# Patient Record
Sex: FEMALE | Race: WHITE | NOT HISPANIC OR LATINO | Employment: UNEMPLOYED | URBAN - METROPOLITAN AREA
[De-identification: names, ages, dates, MRNs, and addresses within clinical notes are randomized per-mention and may not be internally consistent; named-entity substitution may affect disease eponyms.]

---

## 2017-12-19 ENCOUNTER — HOSPITAL ENCOUNTER (OUTPATIENT)
Dept: FAMILY MEDICINE CLINIC | Facility: CLINIC | Age: 37
Setting detail: SPECIMEN
Discharge: HOME | End: 2017-12-19
Attending: FAMILY MEDICINE | Admitting: FAMILY MEDICINE

## 2017-12-19 LAB
ALBUMIN SERPL-MCNC: 3.9 G/DL (ref 3.5–4.8)
ALBUMIN/GLOB SERPL: 1.1 {RATIO} (ref 1–1.7)
ALP SERPL-CCNC: 63 IU/L (ref 32–91)
ALT SERPL-CCNC: 27 IU/L (ref 14–54)
ANION GAP SERPL CALC-SCNC: 11.9 MMOL/L (ref 10–20)
AST SERPL-CCNC: 24 IU/L (ref 15–41)
BASOPHILS # BLD AUTO: 0.1 10*3/UL (ref 0–0.2)
BASOPHILS NFR BLD AUTO: 1 % (ref 0–2)
BILIRUB SERPL-MCNC: 0.4 MG/DL (ref 0.3–1.2)
BUN SERPL-MCNC: 13 MG/DL (ref 8–20)
BUN/CREAT SERPL: 14.4 (ref 5.4–26.2)
CALCIUM SERPL-MCNC: 9.4 MG/DL (ref 8.9–10.3)
CHLORIDE SERPL-SCNC: 102 MMOL/L (ref 101–111)
CHOLEST SERPL-MCNC: 264 MG/DL
CHOLEST/HDLC SERPL: 5.6 {RATIO}
CONV CO2: 28 MMOL/L (ref 22–32)
CONV LDL CHOLESTEROL DIRECT: 184 MG/DL (ref 0–100)
CONV TOTAL PROTEIN: 7.6 G/DL (ref 6.1–7.9)
CREAT UR-MCNC: 0.9 MG/DL (ref 0.4–1)
DIFFERENTIAL METHOD BLD: (no result)
EOSINOPHIL # BLD AUTO: 0.2 10*3/UL (ref 0–0.3)
EOSINOPHIL # BLD AUTO: 2 % (ref 0–3)
ERYTHROCYTE [DISTWIDTH] IN BLOOD BY AUTOMATED COUNT: 13.8 % (ref 11.5–14.5)
GLOBULIN UR ELPH-MCNC: 3.7 G/DL (ref 2.5–3.8)
GLUCOSE SERPL-MCNC: 77 MG/DL (ref 65–99)
HCT VFR BLD AUTO: 45.5 % (ref 35–49)
HDLC SERPL-MCNC: 47 MG/DL
HGB BLD-MCNC: 14.9 G/DL (ref 12–15)
LDLC/HDLC SERPL: 3.9 {RATIO}
LIPID INTERPRETATION: ABNORMAL
LYMPHOCYTES # BLD AUTO: 3.2 10*3/UL (ref 0.8–4.8)
LYMPHOCYTES NFR BLD AUTO: 28 % (ref 18–42)
MCH RBC QN AUTO: 30.4 PG (ref 26–32)
MCHC RBC AUTO-ENTMCNC: 32.7 G/DL (ref 32–36)
MCV RBC AUTO: 93 FL (ref 80–94)
MONOCYTES # BLD AUTO: 0.8 10*3/UL (ref 0.1–1.3)
MONOCYTES NFR BLD AUTO: 7 % (ref 2–11)
NEUTROPHILS # BLD AUTO: 7.4 10*3/UL (ref 2.3–8.6)
NEUTROPHILS NFR BLD AUTO: 62 % (ref 50–75)
NRBC BLD AUTO-RTO: 0 /100{WBCS}
NRBC/RBC NFR BLD MANUAL: 0 10*3/UL
PLATELET # BLD AUTO: 314 10*3/UL (ref 150–450)
PMV BLD AUTO: 8 FL (ref 7.4–10.4)
POTASSIUM SERPL-SCNC: 4.9 MMOL/L (ref 3.6–5.1)
RBC # BLD AUTO: 4.89 10*6/UL (ref 4–5.4)
SODIUM SERPL-SCNC: 137 MMOL/L (ref 136–144)
TRIGL SERPL-MCNC: 247 MG/DL
VLDLC SERPL CALC-MCNC: 32.7 MG/DL
WBC # BLD AUTO: 11.7 10*3/UL (ref 4.5–11.5)

## 2017-12-28 ENCOUNTER — HOSPITAL ENCOUNTER (OUTPATIENT)
Dept: PHYSICAL THERAPY | Facility: HOSPITAL | Age: 37
Setting detail: RECURRING SERIES
Discharge: HOME | End: 2018-01-25
Attending: FAMILY MEDICINE | Admitting: FAMILY MEDICINE

## 2019-01-08 ENCOUNTER — HOSPITAL ENCOUNTER (OUTPATIENT)
Dept: FAMILY MEDICINE CLINIC | Facility: CLINIC | Age: 39
Setting detail: SPECIMEN
Discharge: HOME | End: 2019-01-08
Attending: FAMILY MEDICINE | Admitting: FAMILY MEDICINE

## 2019-01-08 LAB
ALBUMIN SERPL-MCNC: 3.1 G/DL (ref 3.5–4.8)
ALBUMIN/GLOB SERPL: 0.8 {RATIO} (ref 1–1.7)
ALP SERPL-CCNC: 73 IU/L (ref 32–91)
ALT SERPL-CCNC: 22 IU/L (ref 14–54)
ANION GAP SERPL CALC-SCNC: 13.7 MMOL/L (ref 10–20)
AST SERPL-CCNC: 23 IU/L (ref 15–41)
BASOPHILS # BLD AUTO: 0.1 10*3/UL (ref 0–0.2)
BASOPHILS NFR BLD AUTO: 1 % (ref 0–2)
BILIRUB SERPL-MCNC: 0.2 MG/DL (ref 0.3–1.2)
BUN SERPL-MCNC: 6 MG/DL (ref 8–20)
BUN/CREAT SERPL: 10 (ref 5.4–26.2)
CALCIUM SERPL-MCNC: 8.7 MG/DL (ref 8.9–10.3)
CHLORIDE SERPL-SCNC: 102 MMOL/L (ref 101–111)
CHOLEST SERPL-MCNC: 207 MG/DL
CHOLEST/HDLC SERPL: 5 {RATIO}
CONV CO2: 25 MMOL/L (ref 22–32)
CONV LDL CHOLESTEROL DIRECT: 160 MG/DL (ref 0–100)
CONV TOTAL PROTEIN: 6.9 G/DL (ref 6.1–7.9)
CREAT UR-MCNC: 0.6 MG/DL (ref 0.4–1)
DIFFERENTIAL METHOD BLD: (no result)
EOSINOPHIL # BLD AUTO: 0.2 10*3/UL (ref 0–0.3)
EOSINOPHIL # BLD AUTO: 3 % (ref 0–3)
ERYTHROCYTE [DISTWIDTH] IN BLOOD BY AUTOMATED COUNT: 13.4 % (ref 11.5–14.5)
GLOBULIN UR ELPH-MCNC: 3.8 G/DL (ref 2.5–3.8)
GLUCOSE SERPL-MCNC: 74 MG/DL (ref 65–99)
HCT VFR BLD AUTO: 40.5 % (ref 35–49)
HDLC SERPL-MCNC: 41 MG/DL
HGB BLD-MCNC: 13.9 G/DL (ref 12–15)
LDLC/HDLC SERPL: 3.9 {RATIO}
LIPID INTERPRETATION: ABNORMAL
LYMPHOCYTES # BLD AUTO: 1.5 10*3/UL (ref 0.8–4.8)
LYMPHOCYTES NFR BLD AUTO: 16 % (ref 18–42)
MCH RBC QN AUTO: 31.8 PG (ref 26–32)
MCHC RBC AUTO-ENTMCNC: 34.2 G/DL (ref 32–36)
MCV RBC AUTO: 92.9 FL (ref 80–94)
MONOCYTES # BLD AUTO: 0.7 10*3/UL (ref 0.1–1.3)
MONOCYTES NFR BLD AUTO: 8 % (ref 2–11)
NEUTROPHILS # BLD AUTO: 6.8 10*3/UL (ref 2.3–8.6)
NEUTROPHILS NFR BLD AUTO: 72 % (ref 50–75)
NRBC BLD AUTO-RTO: 0 /100{WBCS}
NRBC/RBC NFR BLD MANUAL: 0 10*3/UL
PLATELET # BLD AUTO: 271 10*3/UL (ref 150–450)
PMV BLD AUTO: 8.4 FL (ref 7.4–10.4)
POTASSIUM SERPL-SCNC: 3.7 MMOL/L (ref 3.6–5.1)
RBC # BLD AUTO: 4.36 10*6/UL (ref 4–5.4)
SODIUM SERPL-SCNC: 137 MMOL/L (ref 136–144)
TRIGL SERPL-MCNC: 122 MG/DL
VLDLC SERPL CALC-MCNC: 6.1 MG/DL
WBC # BLD AUTO: 9.3 10*3/UL (ref 4.5–11.5)

## 2019-05-23 ENCOUNTER — CONVERSION ENCOUNTER (OUTPATIENT)
Dept: FAMILY MEDICINE CLINIC | Facility: CLINIC | Age: 39
End: 2019-05-23

## 2019-06-04 VITALS
HEIGHT: 67 IN | WEIGHT: 226 LBS | HEART RATE: 76 BPM | SYSTOLIC BLOOD PRESSURE: 117 MMHG | BODY MASS INDEX: 35.47 KG/M2 | DIASTOLIC BLOOD PRESSURE: 80 MMHG | OXYGEN SATURATION: 98 %

## 2019-06-07 NOTE — TELEPHONE ENCOUNTER
Phone Note   Call from Patient  Summary of Call: the allergy med that was called in is not covered by ins...may need a PA...not sure.  Initial call taken by: Cassidy Montez,  May 23, 2019 3:28 PM    Follow-up for Phone Call   Follow-up Details: can you check this- sent singulair and astelin, tried and failed antihistamine and nasal steroid  Follow-up by: Myrtle Montalvo MD,  May 23, 2019 4:28 PM    Additional Follow-up for Phone Call   Additional Follow-up Details: it does require a pa. it is a step therapy. insurance requires the lower dose azelastine 137mcg nasal spray be tried first.  [phone]   Additional Follow-up by: Ashley Allen CMA,  May 24, 2019 9:23 AM    Additional Follow-up for Phone Call   Additional Follow-up Details: new rx sent  Additional Follow-up by: Myrtle Montalvo MD,  May 24, 2019 9:26 AM    Additional Follow-up for Phone Call   Additional Follow-up Details: pt notified  Additional Follow-up by: Ashley Allen CMA,  May 28, 2019 3:49 PM            Electronically signed by Ashley Allen CMA on 05/28/2019 at 3:49 PM  ________________________________________________________________________       Disclaimer: Converted Note message may not contain all data elements that existed in the legacy source system. Please see [employer] [facility] for the original note details.

## 2019-06-24 RX ORDER — FEXOFENADINE HYDROCHLORIDE 60 MG/1
60 TABLET, FILM COATED ORAL DAILY
Qty: 90 TABLET | Refills: 3 | Status: SHIPPED | OUTPATIENT
Start: 2019-06-24 | End: 2020-08-31

## 2019-07-11 RX ORDER — SERTRALINE HYDROCHLORIDE 100 MG/1
TABLET, FILM COATED ORAL
Qty: 135 TABLET | Refills: 1 | Status: SHIPPED | OUTPATIENT
Start: 2019-07-11 | End: 2020-08-31 | Stop reason: SDDI

## 2020-01-15 RX ORDER — FLUTICASONE PROPIONATE 50 MCG
2 SPRAY, SUSPENSION (ML) NASAL DAILY
COMMUNITY
End: 2020-01-15 | Stop reason: SDUPTHER

## 2020-01-15 RX ORDER — LISINOPRIL 10 MG/1
10 TABLET ORAL DAILY
COMMUNITY
End: 2020-01-15 | Stop reason: SDUPTHER

## 2020-01-16 RX ORDER — LISINOPRIL 10 MG/1
10 TABLET ORAL DAILY
Qty: 90 TABLET | Refills: 0 | Status: SHIPPED | OUTPATIENT
Start: 2020-01-16 | End: 2020-04-20

## 2020-01-16 RX ORDER — FLUTICASONE PROPIONATE 50 MCG
2 SPRAY, SUSPENSION (ML) NASAL DAILY
Qty: 11.1 ML | Refills: 0 | Status: SHIPPED | OUTPATIENT
Start: 2020-01-16 | End: 2020-02-26

## 2020-02-26 RX ORDER — FLUTICASONE PROPIONATE 50 MCG
2 SPRAY, SUSPENSION (ML) NASAL DAILY
Qty: 16 G | Refills: 5 | Status: SHIPPED | OUTPATIENT
Start: 2020-02-26 | End: 2020-05-15

## 2020-04-20 RX ORDER — LISINOPRIL 10 MG/1
10 TABLET ORAL DAILY
Qty: 90 TABLET | Refills: 0 | Status: SHIPPED | OUTPATIENT
Start: 2020-04-20 | End: 2020-05-26

## 2020-05-15 RX ORDER — FLUTICASONE PROPIONATE 50 MCG
2 SPRAY, SUSPENSION (ML) NASAL DAILY
Qty: 144 G | Refills: 0 | Status: SHIPPED | OUTPATIENT
Start: 2020-05-15 | End: 2020-05-15

## 2020-05-15 RX ORDER — FLUTICASONE PROPIONATE 50 MCG
2 SPRAY, SUSPENSION (ML) NASAL DAILY
Qty: 48 G | Refills: 0 | Status: SHIPPED | OUTPATIENT
Start: 2020-05-15 | End: 2020-08-31 | Stop reason: SDUPTHER

## 2020-05-26 RX ORDER — LISINOPRIL 10 MG/1
10 TABLET ORAL DAILY
Qty: 90 TABLET | Refills: 1 | Status: SHIPPED | OUTPATIENT
Start: 2020-05-26 | End: 2020-08-31 | Stop reason: SDUPTHER

## 2020-08-31 ENCOUNTER — OFFICE VISIT (OUTPATIENT)
Dept: FAMILY MEDICINE CLINIC | Facility: CLINIC | Age: 40
End: 2020-08-31

## 2020-08-31 ENCOUNTER — LAB (OUTPATIENT)
Dept: FAMILY MEDICINE CLINIC | Facility: CLINIC | Age: 40
End: 2020-08-31

## 2020-08-31 VITALS
WEIGHT: 259 LBS | HEIGHT: 67 IN | DIASTOLIC BLOOD PRESSURE: 82 MMHG | HEART RATE: 87 BPM | TEMPERATURE: 97.1 F | OXYGEN SATURATION: 97 % | SYSTOLIC BLOOD PRESSURE: 117 MMHG | BODY MASS INDEX: 40.65 KG/M2

## 2020-08-31 DIAGNOSIS — J30.2 SEASONAL ALLERGIES: ICD-10-CM

## 2020-08-31 DIAGNOSIS — Z00.00 ANNUAL PHYSICAL EXAM: ICD-10-CM

## 2020-08-31 DIAGNOSIS — E66.01 CLASS 3 SEVERE OBESITY DUE TO EXCESS CALORIES WITH SERIOUS COMORBIDITY AND BODY MASS INDEX (BMI) OF 40.0 TO 44.9 IN ADULT (HCC): Primary | ICD-10-CM

## 2020-08-31 DIAGNOSIS — I10 ESSENTIAL HYPERTENSION: ICD-10-CM

## 2020-08-31 DIAGNOSIS — F33.1 MAJOR DEPRESSIVE DISORDER, RECURRENT, MODERATE (HCC): ICD-10-CM

## 2020-08-31 DIAGNOSIS — Z12.4 ENCOUNTER FOR PAPANICOLAOU SMEAR FOR CERVICAL CANCER SCREENING: ICD-10-CM

## 2020-08-31 DIAGNOSIS — F41.1 GENERALIZED ANXIETY DISORDER WITH PANIC ATTACKS: ICD-10-CM

## 2020-08-31 DIAGNOSIS — F41.0 GENERALIZED ANXIETY DISORDER WITH PANIC ATTACKS: ICD-10-CM

## 2020-08-31 DIAGNOSIS — E78.2 MIXED HYPERLIPIDEMIA: ICD-10-CM

## 2020-08-31 PROBLEM — I83.90 VARICOSE VEINS OF LOWER EXTREMITY: Status: ACTIVE | Noted: 2019-05-23

## 2020-08-31 PROBLEM — E66.9 OBESITY: Status: ACTIVE | Noted: 2017-12-19

## 2020-08-31 PROCEDURE — 80061 LIPID PANEL: CPT | Performed by: FAMILY MEDICINE

## 2020-08-31 PROCEDURE — 82746 ASSAY OF FOLIC ACID SERUM: CPT | Performed by: FAMILY MEDICINE

## 2020-08-31 PROCEDURE — 99395 PREV VISIT EST AGE 18-39: CPT | Performed by: FAMILY MEDICINE

## 2020-08-31 PROCEDURE — 85025 COMPLETE CBC W/AUTO DIFF WBC: CPT | Performed by: FAMILY MEDICINE

## 2020-08-31 PROCEDURE — 82607 VITAMIN B-12: CPT | Performed by: FAMILY MEDICINE

## 2020-08-31 PROCEDURE — 80053 COMPREHEN METABOLIC PANEL: CPT | Performed by: FAMILY MEDICINE

## 2020-08-31 PROCEDURE — 36415 COLL VENOUS BLD VENIPUNCTURE: CPT | Performed by: FAMILY MEDICINE

## 2020-08-31 PROCEDURE — 99214 OFFICE O/P EST MOD 30 MIN: CPT | Performed by: FAMILY MEDICINE

## 2020-08-31 PROCEDURE — 86803 HEPATITIS C AB TEST: CPT | Performed by: FAMILY MEDICINE

## 2020-08-31 RX ORDER — LISINOPRIL 10 MG/1
10 TABLET ORAL DAILY
Qty: 90 TABLET | Refills: 1 | Status: SHIPPED | OUTPATIENT
Start: 2020-08-31 | End: 2021-02-26

## 2020-08-31 RX ORDER — MONTELUKAST SODIUM 10 MG/1
10 TABLET ORAL NIGHTLY
Qty: 90 TABLET | Refills: 1 | Status: SHIPPED | OUTPATIENT
Start: 2020-08-31 | End: 2021-02-23

## 2020-08-31 RX ORDER — FLUTICASONE PROPIONATE 50 MCG
2 SPRAY, SUSPENSION (ML) NASAL DAILY
Qty: 48 G | Refills: 1 | Status: SHIPPED | OUTPATIENT
Start: 2020-08-31 | End: 2021-03-26

## 2020-08-31 NOTE — PATIENT INSTRUCTIONS
Preventive Care 21-39 Years Old, Female  Preventive care refers to visits with your health care provider and lifestyle choices that can promote health and wellness. This includes:  · A yearly physical exam. This may also be called an annual well check.  · Regular dental visits and eye exams.  · Immunizations.  · Screening for certain conditions.  · Healthy lifestyle choices, such as eating a healthy diet, getting regular exercise, not using drugs or products that contain nicotine and tobacco, and limiting alcohol use.  What can I expect for my preventive care visit?  Physical exam  Your health care provider will check your:  · Height and weight. This may be used to calculate body mass index (BMI), which tells if you are at a healthy weight.  · Heart rate and blood pressure.  · Skin for abnormal spots.  Counseling  Your health care provider may ask you questions about your:  · Alcohol, tobacco, and drug use.  · Emotional well-being.  · Home and relationship well-being.  · Sexual activity.  · Eating habits.  · Work and work environment.  · Method of birth control.  · Menstrual cycle.  · Pregnancy history.  What immunizations do I need?    Influenza (flu) vaccine  · This is recommended every year.  Tetanus, diphtheria, and pertussis (Tdap) vaccine  · You may need a Td booster every 10 years.  Varicella (chickenpox) vaccine  · You may need this if you have not been vaccinated.  Human papillomavirus (HPV) vaccine  · If recommended by your health care provider, you may need three doses over 6 months.  Measles, mumps, and rubella (MMR) vaccine  · You may need at least one dose of MMR. You may also need a second dose.  Meningococcal conjugate (MenACWY) vaccine  · One dose is recommended if you are age 19-21 years and a first-year college student living in a residence [place], or if you have one of several medical conditions. You may also need additional booster doses.  Pneumococcal conjugate (PCV13) vaccine  · You may need  this if you have certain conditions and were not previously vaccinated.  Pneumococcal polysaccharide (PPSV23) vaccine  · You may need one or two doses if you smoke cigarettes or if you have certain conditions.  Hepatitis A vaccine  · You may need this if you have certain conditions or if you travel or work in places where you may be exposed to hepatitis A.  Hepatitis B vaccine  · You may need this if you have certain conditions or if you travel or work in places where you may be exposed to hepatitis B.  Haemophilus influenzae type b (Hib) vaccine  · You may need this if you have certain conditions.  You may receive vaccines as individual doses or as more than one vaccine together in one shot (combination vaccines). Talk with your health care provider about the risks and benefits of combination vaccines.  What tests do I need?    Blood tests  · Lipid and cholesterol levels. These may be checked every 5 years starting at age 20.  · Hepatitis C test.  · Hepatitis B test.  Screening  · Diabetes screening. This is done by checking your blood sugar (glucose) after you have not eaten for a while (fasting).  · Sexually transmitted disease (STD) testing.  · BRCA-related cancer screening. This may be done if you have a family history of breast, ovarian, tubal, or peritoneal cancers.  · Pelvic exam and Pap test. This may be done every 3 years starting at age 21. Starting at age 30, this may be done every 5 years if you have a Pap test in combination with an HPV test.  Talk with your health care provider about your test results, treatment options, and if necessary, the need for more tests.  Follow these instructions at home:  Eating and drinking    · Eat a diet that includes fresh fruits and vegetables, whole grains, lean protein, and low-fat dairy.  · Take vitamin and mineral supplements as recommended by your health care provider.  · Do not drink alcohol if:  ? Your health care provider tells you not to drink.  ? You are  pregnant, may be pregnant, or are planning to become pregnant.  · If you drink alcohol:  ? Limit how much you have to 0-1 drink a day.  ? Be aware of how much alcohol is in your drink. In the [place] one drink equals one 12 oz bottle of beer (355 mL), one 5 oz glass of wine (148 mL), or one 1½ oz glass of hard liquor (44 mL).  Lifestyle  · Take daily care of your teeth and gums.  · Stay active. Exercise for at least 30 minutes on 5 or more days each week.  · Do not use any products that contain nicotine or tobacco, such as cigarettes, e-cigarettes, and chewing tobacco. If you need help quitting, ask your health care provider.  · If you are sexually active, practice safe sex. Use a condom or other form of birth control (contraception) in order to prevent pregnancy and STIs (sexually transmitted infections). If you plan to become pregnant, see your health care provider for a preconception visit.  What's next?  · Visit your health care provider once a year for a well check visit.  · Ask your health care provider how often you should have your eyes and teeth checked.  · Stay up to date on all vaccines.  This information is not intended to replace advice given to you by your health care provider. Make sure you discuss any questions you have with your health care provider.  Document Released: 02/13/2003 Document Revised: 08/29/2019 Document Reviewed: 08/29/2019  [employer] Patient Education © 2020 [employer]      Food Choices for Gastroesophageal Reflux Disease, Adult  When you have gastroesophageal reflux disease (GERD), the foods you eat and your eating habits are very important. Choosing the right foods can help ease your discomfort. Think about working with a nutrition specialist (dietitian) to help you make good choices.  What are tips for following this plan?    Meals  · Choose healthy foods that are low in fat, such as fruits, vegetables, whole grains, low-fat dairy products, and lean meat, fish, and poultry.  · Eat  small meals often instead of 3 large meals a day. Eat your meals slowly, and in a place where you are relaxed. Avoid bending over or lying down until 2-3 hours after eating.  · Avoid eating meals 2-3 hours before bed.  · Avoid drinking a lot of liquid with meals.  · Cook foods using methods other than frying. Bake, grill, or broil food instead.  · Avoid or limit:  ? Chocolate.  ? Peppermint or spearmint.  ? Alcohol.  ? Pepper.  ? Black and decaffeinated coffee.  ? Black and decaffeinated tea.  ? Bubbly (carbonated) soft drinks.  ? Caffeinated energy drinks and soft drinks.  · Limit high-fat foods such as:  ? Fatty meat or fried foods.  ? Whole milk, cream, butter, or ice cream.  ? Nuts and nut butters.  ? Pastries, donuts, and sweets made with butter or shortening.  · Avoid foods that cause symptoms. These foods may be different for everyone. Common foods that cause symptoms include:  ? Tomatoes.  ? Oranges, alondra, and limes.  ? Peppers.  ? Spicy food.  ? Onions and garlic.  ? Vinegar.  Lifestyle  · Maintain a healthy weight. Ask your doctor what weight is healthy for you. If you need to lose weight, work with your doctor to do so safely.  · Exercise for at least 30 minutes for 5 or more days each week, or as told by your doctor.  · Wear loose-fitting clothes.  · Do not smoke. If you need help quitting, ask your doctor.  · Sleep with the head of your bed higher than your feet. Use a wedge under the mattress or blocks under the bed frame to raise the head of the bed.  Summary  · When you have gastroesophageal reflux disease (GERD), food and lifestyle choices are very important in easing your symptoms.  · Eat small meals often instead of 3 large meals a day. Eat your meals slowly, and in a place where you are relaxed.  · Limit high-fat foods such as fatty meat or fried foods.  · Avoid bending over or lying down until 2-3 hours after eating.  · Avoid peppermint and spearmint, caffeine, alcohol, and chocolate.  This  information is not intended to replace advice given to you by your health care provider. Make sure you discuss any questions you have with your health care provider.  Document Released: 06/18/2013 Document Revised: 04/09/2020 Document Reviewed: 01/23/2018  [employer] Patient Education © 2020 [employer]      Exercising to Lose Weight  Exercise is structured, repetitive physical activity to improve fitness and health. Getting regular exercise is important for everyone. It is especially important if you are overweight. Being overweight increases your risk of heart disease, stroke, diabetes, high blood pressure, and several types of cancer. Reducing your calorie intake and exercising can help you lose weight.  Exercise is usually categorized as moderate or vigorous intensity. To lose weight, most people need to do a certain amount of moderate-intensity or vigorous-intensity exercise each week.  Moderate-intensity exercise    Moderate-intensity exercise is any activity that gets you moving enough to burn at least three times more energy (calories) than if you were sitting.  Examples of moderate exercise include:  · Walking a mile in 15 minutes.  · Doing light yard work.  · Biking at an easy pace.  Most people should get at least 150 minutes (2 hours and 30 minutes) a week of moderate-intensity exercise to maintain their body weight.  Vigorous-intensity exercise  Vigorous-intensity exercise is any activity that gets you moving enough to burn at least six times more calories than if you were sitting. When you exercise at this intensity, you should be working hard enough that you are not able to carry on a conversation.  Examples of vigorous exercise include:  · Running.  · Playing a team sport, such as football, basketball, and soccer.  · Jumping rope.  Most people should get at least 75 minutes (1 hour and 15 minutes) a week of vigorous-intensity exercise to maintain their body weight.  How can exercise affect me?  When  you exercise enough to burn more calories than you eat, you lose weight. Exercise also reduces body fat and builds muscle. The more muscle you have, the more calories you burn. Exercise also:  · Improves mood.  · Reduces stress and tension.  · Improves your overall fitness, flexibility, and endurance.  · Increases bone strength.  The amount of exercise you need to lose weight depends on:  · Your age.  · The type of exercise.  · Any health conditions you have.  · Your overall physical ability.  Talk to your health care provider about how much exercise you need and what types of activities are safe for you.  What actions can I take to lose weight?  Nutrition    · Make changes to your diet as told by your health care provider or diet and nutrition specialist (dietitian). This may include:  ? Eating fewer calories.  ? Eating more protein.  ? Eating less unhealthy fats.  ? Eating a diet that includes fresh fruits and vegetables, whole grains, low-fat dairy products, and lean protein.  ? Avoiding foods with added fat, salt, and sugar.  · Drink plenty of water while you exercise to prevent dehydration or heat stroke.  Activity  · Choose an activity that you enjoy and set realistic goals. Your health care provider can help you make an exercise plan that works for you.  · Exercise at a moderate or vigorous intensity most days of the week.  ? The intensity of exercise may vary from person to person. You can tell how intense a workout is for you by paying attention to your breathing and heartbeat. Most people will notice their breathing and heartbeat get faster with more intense exercise.  · Do resistance training twice each week, such as:  ? Push-ups.  ? Sit-ups.  ? Lifting weights.  ? Using resistance bands.  · Getting short amounts of exercise can be just as helpful as long structured periods of exercise. If you have trouble finding time to exercise, try to include exercise in your daily routine.  ? Get up, stretch, and  walk around every 30 minutes throughout the day.  ? Go for a walk during your lunch break.  ? Park your car farther away from your destination.  ? If you take public transportation, get off one stop early and walk the rest of the way.  ? Make phone calls while standing up and walking around.  ? Take the stairs instead of elevators or escalators.  · Wear comfortable clothes and shoes with good support.  · Do not exercise so much that you hurt yourself, feel dizzy, or get very short of breath.  Where to find more information  · [place] Department of Health and Human Services: www.hhs.gov  · Centers for Disease Control and Prevention (CDC): www.cdc.gov  Contact a health care provider:  · Before starting a new exercise program.  · If you have questions or concerns about your weight.  · If you have a medical problem that keeps you from exercising.  Get help right away if you have any of the following while exercising:  · Injury.  · Dizziness.  · Difficulty breathing or shortness of breath that does not go away when you stop exercising.  · Chest pain.  · Rapid heartbeat.  Summary  · Being overweight increases your risk of heart disease, stroke, diabetes, high blood pressure, and several types of cancer.  · Losing weight happens when you burn more calories than you eat.  · Reducing the amount of calories you eat in addition to getting regular moderate or vigorous exercise each week helps you lose weight.  This information is not intended to replace advice given to you by your health care provider. Make sure you discuss any questions you have with your health care provider.  Document Released: 01/20/2012 Document Revised: 12/31/2018 Document Reviewed: 12/31/2018  [employer]vier Patient Education © 2020 [employer]      Living With Depression  Everyone experiences occasional disappointment, sadness, and loss in their lives. When you are feeling down, blue, or sad for at least 2 weeks in a row, it may mean that you have depression.  Depression can affect your thoughts and feelings, relationships, daily activities, and physical health. It is caused by changes in the way your brain functions. If you receive a diagnosis of depression, your health care provider will tell you which type of depression you have and what treatment options are available to you.  If you are living with depression, there are ways to help you recover from it and also ways to prevent it from coming back.  How to cope with lifestyle changes  Coping with stress         Stress is your body’s reaction to life changes and events, both good and bad. Stressful situations may include:  · Getting .  · The death of a spouse.  · Losing a job.  · Retiring.  · Having a baby.  Stress can last just a few hours or it can be ongoing. Stress can play a major role in depression, so it is important to learn both how to cope with stress and how to think about it differently.  Talk with your health care provider or a counselor if you would like to learn more about stress reduction. He or she may suggest some stress reduction techniques, such as:  · Music therapy. This can include creating music or listening to music. Choose music that you enjoy and that inspires you.  · Mindfulness-based meditation. This kind of meditation can be done while sitting or walking. It involves being aware of your normal breaths, rather than trying to control your breathing.  · Centering prayer. This is a kind of meditation that involves focusing on a spiritual word or phrase. Choose a word, phrase, or sacred image that is meaningful to you and that brings you peace.  · Deep breathing. To do this, expand your stomach and inhale slowly through your nose. Hold your breath for 3-5 seconds, then exhale slowly, allowing your stomach muscles to relax.  · Muscle relaxation. This involves intentionally tensing muscles then relaxing them.  Choose a stress reduction technique that fits your lifestyle and personality.  Stress reduction techniques take time and practice to develop. Set aside 5-15 minutes a day to do them. Therapists can offer training in these techniques. The training may be covered by some insurance plans. Other things you can do to manage stress include:  · Keeping a stress diary. This can help you learn what triggers your stress and ways to control your response.  · Understanding what your limits are and saying no to requests or events that lead to a schedule that is too full.  · Thinking about how you respond to certain situations. You may not be able to control everything, but you can control how you react.  · Adding humor to your life by watching funny films or TV shows.  · Making time for activities that help you relax and not feeling guilty about spending your time this way.    Medicines  Your health care provider may suggest certain medicines if he or she feels that they will help improve your condition. Avoid using alcohol and other substances that may prevent your medicines from working properly (may interact). It is also important to:  · Talk with your pharmacist or health care provider about all the medicines that you take, their possible side effects, and what medicines are safe to take together.  · Make it your goal to take part in all treatment decisions (shared decision-making). This includes giving input on the side effects of medicines. It is best if shared decision-making with your health care provider is part of your total treatment plan.  If your health care provider prescribes a medicine, you may not notice the full benefits of it for 4-8 weeks. Most people who are treated for depression need to be on medicine for at least 6-12 months after they feel better. If you are taking medicines as part of your treatment, do not stop taking medicines without first talking to your health care provider. You may need to have the medicine slowly decreased (tapered) over time to decrease the risk of harmful  side effects.  Relationships  Your health care provider may suggest family therapy along with individual therapy and drug therapy. While there may not be family problems that are causing you to feel depressed, it is still important to make sure your family learns as much as they can about your mental health. Having your family’s support can help make your treatment successful.  How to recognize changes in your condition  Everyone has a different response to treatment for depression. Recovery from major depression happens when you have not had signs of major depression for two months. This may mean that you will start to:  · Have more interest in doing activities.  · Feel less hopeless than you did 2 months ago.  · Have more energy.  · Overeat less often, or have better or improving appetite.  · Have better concentration.  Your health care provider will work with you to decide the next steps in your recovery. It is also important to recognize when your condition is getting worse. Watch for these signs:  · Having fatigue or low energy.  · Eating too much or too little.  · Sleeping too much or too little.  · Feeling restless, agitated, or hopeless.  · Having trouble concentrating or making decisions.  · Having unexplained physical complaints.  · Feeling irritable, angry, or aggressive.  Get help as soon as you or your family members notice these symptoms coming back.  How to get support and help from others  How to talk with friends and family members about your condition    Talking to friends and family members about your condition can provide you with one way to get support and guidance. Reach out to trusted friends or family members, explain your symptoms to them, and let them know that you are working with a health care provider to treat your depression.  Financial resources  Not all insurance plans cover mental health care, so it is important to check with your insurance carrier. If paying for co-pays or  counseling services is a problem, search for a local or [facility] mental health care center. They may be able to offer public mental health care services at low or no cost when you are not able to see a private health care provider.  If you are taking medicine for depression, you may be able to get the generic form, which may be less expensive. Some makers of prescription medicines also offer help to patients who cannot afford the medicines they need.  Follow these instructions at home:    · Get the right amount and quality of sleep.  · Cut down on using caffeine, tobacco, alcohol, and other potentially harmful substances.  · Try to exercise, such as walking or lifting small weights.  · Take over-the-counter and prescription medicines only as told by your health care provider.  · Eat a healthy diet that includes plenty of vegetables, fruits, whole grains, low-fat dairy products, and lean protein. Do not eat a lot of foods that are high in solid fats, added sugars, or salt.  · Keep all follow-up visits as told by your health care provider. This is important.  Contact a health care provider if:  · You stop taking your antidepressant medicines, and you have any of these symptoms:  ? Nausea.  ? Headache.  ? Feeling lightheaded.  ? Chills and body aches.  ? Not being able to sleep (insomnia).  · You or your friends and family think your depression is getting worse.  Get help right away if:  · You have thoughts of hurting yourself or others.  If you ever feel like you may hurt yourself or others, or have thoughts about taking your own life, get help right away. You can go to your nearest emergency department or call:  · Your local emergency services (911 in the [place]  · A suicide crisis helpline, such as the National Suicide Prevention Lifeline at 1-405.245.6569. This is open 24-hours a day.  Summary  · If you are living with depression, there are ways to help you recover from it and also ways to prevent it from coming  back.  · Work with your health care team to create a management plan that includes counseling, stress management techniques, and healthy lifestyle habits.  This information is not intended to replace advice given to you by your health care provider. Make sure you discuss any questions you have with your health care provider.  Document Released: 11/20/2017 Document Revised: 04/10/2020 Document Reviewed: 11/20/2017  [employer]vier Patient Education © 2020 [employer]       ADVANCE CARE PLANNING  Conversations that Matter  PLANNING GUIDE    LOOKING BACK ...  Who we are, what we believe, and what we value are all shaped by experiences we have had. [place], family traditions, jobs and friends affect us deeply.    Has anything happened in your past that shaped your feelings about medical treatment?    Think about an experience you may have had with a family member or friend who was faced with a decision about medical care near the end of life. What was positive about that experience? What do you wish would have been done differently?    HERE AND NOW ...  Do you have any significant health problems now? What kinds of things bring you such patricia that, should a health problem prevent you from doing them any more, life would have little meaning? What short- or long-term goals do you have? How might medical treatment help you or hinder you in accomplishing those goals?    WHAT ABOUT TOMORROW?  What significant health problems do you fear may affect you in the future? How do you feel about the possibility of having to go to a nursing home? How would decisions be made if you could not make them?    WHO SHOULD MAKE DECISIONS?  An important part of planning is to consider whether or not you could appoint someone to make your healthcare decisions if you could not make them yourself. Many people select a close family member, but you are free to pick anyone you think could best represent you. Whoever you appoint should have all of the  "following qualifications:    • Can be trusted.  • Is willing to accept this responsibility.  • Is willing to follow the values and instructions you have discussed.  • Is able to make complex, difficult decisions.    It is helpful, but not required, to appoint one or more alternate persons in case your first choice becomes unable or unwilling to represent you. It is best if only one person has authority at a time, but you can instruct your representatives to discuss decisions together if time permits.    WHAT FUTURE DECISIONS NEED TO BE CONSIDERED?  Providing instructions for future healthcare decisions may seem like an impossible task. How can anyone plan for all the possibilities? You cannot … but you do not have to.    You need to plan for situations where you:  1. Become unexpectedly incapable of making your own decisions  2. It is clear you will have little or no recovery, and  3. The injury or loss of function is significant.    Such a situation might arise because of an injury to the brain from an accident, a stroke, or a slowly progressive disease such as Alzheimer's.    To plan for this type of situation, many people state, \"If I'm going to be a vegetable, let me go,\" or \"No heroics,\" or \"Don't keep me alive on machines.\" While these remarks are a beginning, they simply are too vague to guide decision-making.    You need to completely describe under what circumstances your goals for medical care should be changed from attempting to prolong life to being allowed to die. In some situations, certain treatments may not make sense because they will not help, but other treatments will be of important benefit.    Consider these three questions:  1. When would it make sense to continue certain treatments in an effort to prolong life and seek recovery?  2. When would it make sense to stop or withhold certain treatments and accept death when it comes?  3. Under any circumstance, what kind of comfort care would you " want, including medication, spiritual and environmental options?    Making these choices requires understanding the information, weighing the benefits and burdens from your perspective, and then discussing your choices with those closest to you.    WHAT'S NEXT?  How do you make sure that your choices are respected? First talk, about them with your family, friends, clergy and physician, then put your choices in writing. Information about putting your plans into writing -- in an advance directive -- is available from your healthcare organization or .    Do you have any significant health problems? What health problems do you fear in the future?     Consider what frightens you most about medical treatment. What role does Roman Catholic, karen or spirituality play in how you live your life? How does cost influence your decisions about medical care?   In terms of future medical care, under what circumstances would you want the goals of medical treatment to switch from attempting to prolong life to focusing on comfort?     Describe these circumstances in as much detail as possible.   Ask yourself, what will most help me live well at this point in my life?     Share your views with the person or people who would make your medical decisions if you could not make them.     THERE'S NO EASY WAY TO PLAN FOR FUTURE HEALTHCARE CHOICES.  It's a process that involves thinking and talking about complex and sensitive issues.    The questions that follow will help in the advance care planning process. This is a guide for your own benefit; it's not a test, and there are no right or wrong answers. It does not need to be completed all at once. You may use it to share your feelings with healthcare providers, your family and your friends. The answers to these questions will help those you love make choices for you when you cannot make them yourself.    These are things I need to tell my loved ones:  What is your idea of comfort care?  Describe how you would want medications to be used to provide comfort. What type of spiritual care would you want?     I need to learn about: ____________________________  I need to ask my healthcare provider: ________________

## 2020-08-31 NOTE — PROGRESS NOTES
"  Subjective:     Hyun Perez is a 40 y.o. female who presents for  Chief Complaint   Patient presents with   • Annual Exam     annual physical exam with pap    • Edema     right foot and right lower leg   • Anxiety     anxiety and panic attacks    • Allergies     would like different allergy medication       This is a new patient to me.    HPI  Patient presents for an annual physical exam.    Diet: carbohydrate heavy, junk food heavy; eating in the middle of the night; drinking water  Exercise: none  Dentist: greater than 1 year ago    Seatbelt Use: regular    Breast Cancer Screening: NA. Start screening at 50.  Cervical Cancer Screening: Not up to date. Testing ordered.   Colon Cancer Screening: NA. Start screening at 50.    Period Duration (Days): 4  Period Pattern: Regular(becoming irregular)  Menstrual Flow: Moderate  Menstrual Control: Tampon  Dysmenorrhea: (!) Mild  Dysmenorrhea Symptoms: Cramping    Sexual History:  Age of First Sexual Encounter: 15 years  Number of Partners in Last Year: 1 years  Sexually Transmitted Infection History: Other (Comment), Chlamydia(HPV)    Patient has a concurrent medical history of essential hypertension that is well controlled with lisinopril 10 mg. Patient is normotensive in office. Associated conditions include hyperlipidemia that is currently untreated. Cardiovascular disease is exacerbated by obesity.    Patient also has a CMHx of major depressive disorder and generalized anxiety disorder with panic attacks. Complaints of worsening anxiety in recent weeks, citing the passing of friends and family and current social climate and pandemic. Associated symptoms include jitteriness, \"feeling like she's on a diet pill\", and insomnia with multiple nighttime awakenings.  Of note patient has a family history of depression in her mom and maternal relatives.  Patient's mother committed suicide approximately 5 years ago.  Patient herself reports a history of suicidal thoughts " though never acted out on thoughts. Failed multiple medications in the past including Zoloft, Wellbutrin, Depakote, Prozac, Gabapentin, and Xanax. Reports worsening anxiety with Wellbutrin. Reports blase demeanor with Zoloft. Does not believe in CBT.     Patient also has a concurrent medical history of seasonal allergies that are moderately controlled with Flonase and Allegra.  Patient requesting Singulair in place of Allegra, citing non-efficacy.  Associated symptoms include nasal congestion and diffuse hives.  Patient is interested in allergen testing.    Preventative:  PHQ-9 Depression Screening  Little interest or pleasure in doing things? 0   Feeling down, depressed, or hopeless? 0   Trouble falling or staying asleep, or sleeping too much? 3(multiple night time awakenings)   Feeling tired or having little energy? 3(gained 30lbs in 6 months)   Poor appetite or overeating? 3(eating poorly in the middle of the night)   Feeling bad about yourself - or that you are a failure or have let yourself or your family down? 0   Trouble concentrating on things, such as reading the newspaper or watching television? 0   Moving or speaking so slowly that other people could have noticed? Or the opposite - being so fidgety or restless that you have been moving around a lot more than usual? 0(living alone)   Thoughts that you would be better off dead, or of hurting yourself in some way? (think about death daily; lost 4 people in recent years)   PHQ-9 Total Score 9   If you checked off any problems, how difficult have these problems made it for you to do your work, take care of things at home, or get along with other people?       Past Medical Hx:  Past Medical History:   Diagnosis Date   • Anxiety        Past Surgical Hx:  Past Surgical History:   Procedure Laterality Date   • LEEP  1997   • TUBAL ABDOMINAL LIGATION Bilateral 2015       Family Hx:  Family History   Problem Relation Age of Onset   • Hypertension Mother    •  Coronary artery disease Mother         CABG x4 in late 30s   • Depression Mother         mother committed suicide   • Hypertension Maternal Grandmother    • Diabetes Maternal Grandmother    • Depression Maternal Grandmother         committed suicide   • Hypertension Maternal Grandfather    • Diabetes Maternal Grandfather    • Heart attack Maternal Grandfather    • Depression Maternal Cousin         committed suicide   • Depression Maternal Aunt         committed suicide        Social History:  Social History     Socioeconomic History   • Marital status: Single     Spouse name: Not on file   • Number of children: Not on file   • Years of education: Not on file   • Highest education level: Not on file   Tobacco Use   • Smoking status: Former Smoker   • Smokeless tobacco: Never Used   Substance and Sexual Activity   • Alcohol use: Yes   • Drug use: Not Currently       Allergies:  Banana; Kiwi extract; and Penicillin v potassium    Current Meds:    Current Outpatient Medications:   •  fluticasone (FLONASE) 50 MCG/ACT nasal spray, 2 sprays into the nostril(s) as directed by provider Daily. DO NOT SNIFF!, Disp: 48 g, Rfl: 0  •  lisinopril (PRINIVIL,ZESTRIL) 10 MG tablet, Take 1 tablet by mouth Daily., Disp: 90 tablet, Rfl: 1    The following portions of the patient's history were reviewed and updated as appropriate: allergies, current medications, past family history, past medical history, past social history, past surgical history and problem list.    Review of Systems  Review of Systems   Constitutional: Negative for chills, diaphoresis, fatigue and fever.   HENT: Negative for congestion, rhinorrhea, sinus pressure, sneezing and sore throat.    Eyes: Negative for blurred vision, double vision and redness.   Respiratory: Negative for cough, shortness of breath and wheezing.    Cardiovascular: Positive for leg swelling. Negative for chest pain.   Gastrointestinal: Negative for abdominal pain, constipation, diarrhea,  "nausea and vomiting.   Genitourinary: Negative for difficulty urinating, dysuria and hematuria.   Musculoskeletal: Negative for arthralgias, gait problem and myalgias.   Skin: Negative for rash and skin lesions.   Allergic/Immunologic: Positive for environmental allergies.   Neurological: Negative for tremors, seizures, syncope and headache.   Psychiatric/Behavioral: Positive for dysphoric mood, sleep disturbance and depressed mood. The patient is nervous/anxious.        Objective:     /82 (BP Location: Left arm, Patient Position: Sitting, Cuff Size: Large Adult)   Pulse 87   Temp 97.1 °F (36.2 °C) (Infrared)   Ht 170.2 cm (67\")   Wt 117 kg (259 lb)   SpO2 97%   BMI 40.57 kg/m²     Physical Exam   Constitutional: She is oriented to person, place, and time. She appears well-developed and well-nourished. No distress. She is morbidly obese.  HENT:   Head: Normocephalic and atraumatic.   Right Ear: Tympanic membrane and ear canal normal.   Left Ear: Tympanic membrane and ear canal normal.   Nose: Nose normal.   Mouth/Throat: Oropharynx is clear and moist and mucous membranes are normal.   Eyes: Pupils are equal, round, and reactive to light. Conjunctivae and EOM are normal. No scleral icterus.   Neck: Normal range of motion.   Cardiovascular: Normal rate, regular rhythm, normal heart sounds and intact distal pulses.   Pulmonary/Chest: Effort normal and breath sounds normal.   Abdominal: Soft. There is no tenderness.   Genitourinary: Vagina normal, uterus normal and cervix normal. Pelvic exam was performed with patient supine. No labial fusion. There is no rash on the right labia. There is no rash on the left labia. Right adnexum is non-palpable.Left adnexum is non-palpable.  Genitourinary Comments: Exam chaperoned by Jocelyne Solis MA.   Musculoskeletal: She exhibits no edema.   Neurological: She is alert and oriented to person, place, and time.   Skin: Skin is warm and dry. Capillary refill takes less than 2 " seconds. No rash noted. She is not diaphoretic.   Psychiatric: Her behavior is normal. Her affect is labile (crying & laughing throughout interview). She exhibits a depressed mood.   Vitals reviewed.      Lab Results   Component Value Date    WBC 8.4 02/20/2019    HGB 13.0 02/20/2019    HCT 39.3 02/20/2019    MCV 92.1 02/20/2019     02/20/2019     Lab Results   Component Value Date    GLUCOSE 88 02/20/2019    BUN 10 02/20/2019    CREATININE 0.6 02/20/2019    BCR 16.7 02/20/2019    K 3.8 02/20/2019    CO2 24 02/20/2019    CALCIUM 9.0 02/20/2019    ALBUMIN 3.1 (L) 01/08/2019    LABIL2 0.8 (L) 01/08/2019    AST 23 01/08/2019    ALT 22 01/08/2019     Lab Results   Component Value Date    CHOL 207 (H) 01/08/2019    TRIG 122 01/08/2019    HDL 41 01/08/2019     (H) 01/08/2019       Assessment/Plan:     Hyun was seen today for annual exam, edema, anxiety and allergies.    Diagnoses and all orders for this visit:    Class 3 severe obesity due to excess calories with serious comorbidity and body mass index (BMI) of 40.0 to 44.9 in adult (CMS/[facility])  Comments:  Discussed health risks associated with obesity.  Encouraged 150 minutes of exercise weekly.  Orders:  -     CBC Auto Differential  -     Comprehensive Metabolic Panel  -     Lipid Panel    Essential hypertension  Comments:  BP goal <140/90.  Encouraged low-Na+ diet & 150 minutes exercise weekly.   Continue lisinopril 10 mg.  Orders:  -     CBC Auto Differential  -     Comprehensive Metabolic Panel  -     Lipid Panel  -     lisinopril (PRINIVIL,ZESTRIL) 10 MG tablet; Take 1 tablet by mouth Daily.    Mixed hyperlipidemia  Comments:  Reviewed lipid panel & LDL goal less than 100.  Encouraged a diet rich in vegetables & 150 minutes of exercise weekly.  Orders:  -     CBC Auto Differential  -     Comprehensive Metabolic Panel  -     Lipid Panel    Major depressive disorder, recurrent, moderate (CMS/HCC)  Comments:  Poorly controlled. No SI/HI.  Educational info  in AVS.  Will obtain GeneSight testing.  Declined therapy.  RTO if symptoms worsen.  Orders:  -     CBC Auto Differential  -     Comprehensive Metabolic Panel  -     Vitamin B12  -     Folate    Generalized anxiety disorder with panic attacks  Comments:  Poorly controlled.  Educational info in AVS.  Will obtain GeneSight testing.  Declined therapy.  RTO if symptoms worsen.  Orders:  -     CBC Auto Differential  -     Comprehensive Metabolic Panel  -     Vitamin B12  -     Folate    Seasonal allergies  Comments:  Requesting food and allergen testing.  Discuss appropriate Flonase use.  Educational info in AVS.  Orders:  -     montelukast (Singulair) 10 MG tablet; Take 1 tablet by mouth Every Night.  -     fluticasone (FLONASE) 50 MCG/ACT nasal spray; 2 sprays into the nostril(s) as directed by provider Daily. DO NOT SNIFF!  -     Ambulatory Referral to ENT (Otolaryngology)    Encounter for Papanicolaou smear for cervical cancer screening  -     IGP,Aptima HPV,Age Gdln    Annual physical exam  -     CBC Auto Differential  -     Comprehensive Metabolic Panel  -     Hepatitis C Antibody  -     Lipid Panel  -     Vitamin B12  -     Folate  -     IGP,Aptima HPV,Age Gdln    Encouraged 150 minutes of moderate intensity activity weekly.   Encouraged regular dental visits.   Encouraged regular seat belt use.   Encouraged safe sex practices.   Patient provided with educational material regarding preventive health care in AVS.    Follow-up:     Return in about 6 months (around 2/28/2021) for Recheck depression & anxiety.      Signature    Asiya Cooper MD  Family Medicine  [facility]        This document has been electronically signed by Asiya Cooper MD on August 31, 2020 14:43

## 2020-09-01 DIAGNOSIS — E78.2 MIXED HYPERLIPIDEMIA: Primary | ICD-10-CM

## 2020-09-01 LAB
ALBUMIN SERPL-MCNC: 4 G/DL (ref 3.5–5.2)
ALBUMIN/GLOB SERPL: 1.1 G/DL
ALP SERPL-CCNC: 73 U/L (ref 39–117)
ALT SERPL W P-5'-P-CCNC: 26 U/L (ref 1–33)
ANION GAP SERPL CALCULATED.3IONS-SCNC: 9 MMOL/L (ref 5–15)
AST SERPL-CCNC: 14 U/L (ref 1–32)
BASOPHILS # BLD AUTO: 0.05 10*3/MM3 (ref 0–0.2)
BASOPHILS NFR BLD AUTO: 0.6 % (ref 0–1.5)
BILIRUB SERPL-MCNC: 0.2 MG/DL (ref 0–1.2)
BUN SERPL-MCNC: 10 MG/DL (ref 6–20)
BUN/CREAT SERPL: 14.9 (ref 7–25)
CALCIUM SPEC-SCNC: 9.4 MG/DL (ref 8.6–10.5)
CHLORIDE SERPL-SCNC: 103 MMOL/L (ref 98–107)
CHOLEST SERPL-MCNC: 253 MG/DL (ref 0–200)
CO2 SERPL-SCNC: 25 MMOL/L (ref 22–29)
CREAT SERPL-MCNC: 0.67 MG/DL (ref 0.57–1)
DEPRECATED RDW RBC AUTO: 42.4 FL (ref 37–54)
EOSINOPHIL # BLD AUTO: 0.17 10*3/MM3 (ref 0–0.4)
EOSINOPHIL NFR BLD AUTO: 2 % (ref 0.3–6.2)
ERYTHROCYTE [DISTWIDTH] IN BLOOD BY AUTOMATED COUNT: 12.7 % (ref 12.3–15.4)
FOLATE SERPL-MCNC: 8.25 NG/ML (ref 4.78–24.2)
GFR SERPL CREATININE-BSD FRML MDRD: 98 ML/MIN/1.73
GLOBULIN UR ELPH-MCNC: 3.7 GM/DL
GLUCOSE SERPL-MCNC: 100 MG/DL (ref 65–99)
HCT VFR BLD AUTO: 41 % (ref 34–46.6)
HCV AB SER DONR QL: NORMAL
HDLC SERPL-MCNC: 45 MG/DL (ref 40–60)
HGB BLD-MCNC: 13.3 G/DL (ref 12–15.9)
IMM GRANULOCYTES # BLD AUTO: 0.03 10*3/MM3 (ref 0–0.05)
IMM GRANULOCYTES NFR BLD AUTO: 0.4 % (ref 0–0.5)
LDLC SERPL CALC-MCNC: 173 MG/DL (ref 0–100)
LDLC/HDLC SERPL: 3.84 {RATIO}
LYMPHOCYTES # BLD AUTO: 1.77 10*3/MM3 (ref 0.7–3.1)
LYMPHOCYTES NFR BLD AUTO: 21.2 % (ref 19.6–45.3)
MCH RBC QN AUTO: 29.6 PG (ref 26.6–33)
MCHC RBC AUTO-ENTMCNC: 32.4 G/DL (ref 31.5–35.7)
MCV RBC AUTO: 91.1 FL (ref 79–97)
MONOCYTES # BLD AUTO: 0.56 10*3/MM3 (ref 0.1–0.9)
MONOCYTES NFR BLD AUTO: 6.7 % (ref 5–12)
NEUTROPHILS NFR BLD AUTO: 5.78 10*3/MM3 (ref 1.7–7)
NEUTROPHILS NFR BLD AUTO: 69.1 % (ref 42.7–76)
NRBC BLD AUTO-RTO: 0 /100 WBC (ref 0–0.2)
PLATELET # BLD AUTO: 344 10*3/MM3 (ref 140–450)
PMV BLD AUTO: 10.1 FL (ref 6–12)
POTASSIUM SERPL-SCNC: 4.2 MMOL/L (ref 3.5–5.2)
PROT SERPL-MCNC: 7.7 G/DL (ref 6–8.5)
RBC # BLD AUTO: 4.5 10*6/MM3 (ref 3.77–5.28)
SODIUM SERPL-SCNC: 137 MMOL/L (ref 136–145)
TRIGL SERPL-MCNC: 176 MG/DL (ref 0–150)
VIT B12 BLD-MCNC: 400 PG/ML (ref 211–946)
VLDLC SERPL-MCNC: 35.2 MG/DL (ref 5–40)
WBC # BLD AUTO: 8.36 10*3/MM3 (ref 3.4–10.8)

## 2020-09-01 RX ORDER — ATORVASTATIN CALCIUM 20 MG/1
20 TABLET, FILM COATED ORAL NIGHTLY
Qty: 90 TABLET | Refills: 1 | Status: SHIPPED | OUTPATIENT
Start: 2020-09-01 | End: 2021-09-22 | Stop reason: SDDI

## 2020-09-02 LAB
AGE GDLN ACOG TESTING: NORMAL
CHROM ANALY OVERALL INTERP-IMP: NORMAL
CONV .: NORMAL
CONV PERFORMED BY:: NORMAL
DX ICD CODE: NORMAL
HIV 1 & 2 AB SER-IMP: NORMAL
HPV I/H RISK 4 DNA CVX QL PROBE+SIG AMP: NEGATIVE
REF LAB TEST METHOD: NORMAL
STAT OF ADQ CVX/VAG CYTO-IMP: NORMAL

## 2020-09-04 ENCOUNTER — TELEPHONE (OUTPATIENT)
Dept: FAMILY MEDICINE CLINIC | Facility: CLINIC | Age: 40
End: 2020-09-04

## 2020-09-04 NOTE — TELEPHONE ENCOUNTER
Patient was reading about her cholesterol medication and says it has some awful side effects. She wants to know if she can work on diet and exercise first then recheck labs? If so, when does she need to recheck her labs?

## 2020-09-04 NOTE — TELEPHONE ENCOUNTER
Please call patient and let her know that if she is going to do a trial of diet and exercise we can recheck her cholesterol level in 6 months.  I would like her to be fasting at her next visit so we can check cholesterol.

## 2021-02-23 DIAGNOSIS — J30.2 SEASONAL ALLERGIES: ICD-10-CM

## 2021-02-23 RX ORDER — MONTELUKAST SODIUM 10 MG/1
10 TABLET ORAL NIGHTLY
Qty: 90 TABLET | Refills: 1 | Status: SHIPPED | OUTPATIENT
Start: 2021-02-23 | End: 2021-08-23

## 2021-02-25 DIAGNOSIS — I10 ESSENTIAL HYPERTENSION: ICD-10-CM

## 2021-02-26 RX ORDER — LISINOPRIL 10 MG/1
10 TABLET ORAL DAILY
Qty: 90 TABLET | Refills: 1 | Status: SHIPPED | OUTPATIENT
Start: 2021-02-26 | End: 2021-08-23

## 2021-02-26 NOTE — TELEPHONE ENCOUNTER
BP Readings from Last 3 Encounters:   08/31/20 117/82   05/23/19 117/80     Lab Results   Component Value Date    CREATININE 0.67 08/31/2020     Lab Results   Component Value Date    K 4.2 08/31/2020

## 2021-03-01 ENCOUNTER — OFFICE VISIT (OUTPATIENT)
Dept: FAMILY MEDICINE CLINIC | Facility: CLINIC | Age: 41
End: 2021-03-01

## 2021-03-01 VITALS
WEIGHT: 266 LBS | OXYGEN SATURATION: 97 % | DIASTOLIC BLOOD PRESSURE: 84 MMHG | TEMPERATURE: 97.1 F | HEIGHT: 67 IN | BODY MASS INDEX: 41.75 KG/M2 | HEART RATE: 86 BPM | SYSTOLIC BLOOD PRESSURE: 122 MMHG

## 2021-03-01 DIAGNOSIS — E66.01 CLASS 3 SEVERE OBESITY DUE TO EXCESS CALORIES WITH SERIOUS COMORBIDITY AND BODY MASS INDEX (BMI) OF 40.0 TO 44.9 IN ADULT (HCC): ICD-10-CM

## 2021-03-01 DIAGNOSIS — D17.24 LIPOMA OF LEFT LOWER EXTREMITY: ICD-10-CM

## 2021-03-01 DIAGNOSIS — F33.1 MAJOR DEPRESSIVE DISORDER, RECURRENT, MODERATE (HCC): Primary | ICD-10-CM

## 2021-03-01 PROCEDURE — 99214 OFFICE O/P EST MOD 30 MIN: CPT | Performed by: FAMILY MEDICINE

## 2021-03-01 RX ORDER — SERTRALINE HYDROCHLORIDE 25 MG/1
25 TABLET, FILM COATED ORAL DAILY
Qty: 30 TABLET | Refills: 0 | Status: SHIPPED | OUTPATIENT
Start: 2021-03-01 | End: 2021-09-22

## 2021-03-01 RX ORDER — BUPROPION HYDROCHLORIDE 150 MG/1
150 TABLET ORAL DAILY
Qty: 30 TABLET | Refills: 1 | Status: SHIPPED | OUTPATIENT
Start: 2021-03-01 | End: 2021-04-26 | Stop reason: SDUPTHER

## 2021-03-01 NOTE — PROGRESS NOTES
Subjective:     Hyun Perez is a 41 y.o. female who presents for  Chief Complaint   Patient presents with   • Depression     she is in to discuss depression and weight gain. started taking an old prescription of zoloft she had at home around September    • Weight Gain   • Cyst     Morbidly obese  female with CMHx of depression dating back to teenage years presents with complaints of worsening depression. Reports that mother committed suicide in the same house she is living in. This summer was the 5 year anniversary of mother's passing. Complains of SI though no plan in place. Restarted an old prescription of Zoloft 50 mg since summer 2020. Reports sleeping 12-13 hours per day. Lost 3 jobs and cannot get to work in time. Reports failing Prozac, Wellbutrin, Paxil, and citalopram in the past. Reports stopping medication of her own volition in the past. Interested in restarting Wellbutrin.    Presents with complaints of weight gain. Does not participate in regular exercise. Does not monitor caloric intake; reports eating generally healthy. Weight upon graduating HS was 130 lbs. Gained 100 lbs in the last 5 years since her mother passed. Drinking wine a couple times a week.     Presents with complains of subQ mass, the size of marble, along her left inner thigh that has grown to the size of an egg over the course of 10 year. Now chaffing since weight gain. Denies any pain associated with mass.    Past Medical Hx:  Past Medical History:   Diagnosis Date   • Anxiety        Past Surgical Hx:  Past Surgical History:   Procedure Laterality Date   • LEEP  1997   • TUBAL ABDOMINAL LIGATION Bilateral 2015       Family Hx:  Family History   Problem Relation Age of Onset   • Hypertension Mother    • Coronary artery disease Mother         CABG x4 in late 30s   • Depression Mother         mother committed suicide   • Hypertension Maternal Grandmother    • Diabetes Maternal Grandmother    • Depression Maternal  Grandmother         committed suicide   • Hypertension Maternal Grandfather    • Diabetes Maternal Grandfather    • Heart attack Maternal Grandfather    • Depression Maternal Cousin         committed suicide   • Depression Maternal Aunt         committed suicide        Social History:  Social History     Socioeconomic History   • Marital status: Single     Spouse name: Not on file   • Number of children: Not on file   • Years of education: Not on file   • Highest education level: Not on file   Tobacco Use   • Smoking status: Former Smoker   • Smokeless tobacco: Never Used   Substance and Sexual Activity   • Alcohol use: Yes   • Drug use: Not Currently       Allergies:  Banana, Kiwi extract, Penicillin v potassium, and Milk thistle    Current Meds:    Current Outpatient Medications:   •  fluticasone (FLONASE) 50 MCG/ACT nasal spray, 2 sprays into the nostril(s) as directed by provider Daily. DO NOT SNIFF!, Disp: 48 g, Rfl: 1  •  lisinopril (PRINIVIL,ZESTRIL) 10 MG tablet, Take 1 tablet by mouth Daily., Disp: 90 tablet, Rfl: 1  •  montelukast (SINGULAIR) 10 MG tablet, Take 1 tablet by mouth Every Night., Disp: 90 tablet, Rfl: 1  •  sertraline (ZOLOFT) 50 MG tablet, Take 50 mg by mouth Daily. Started taking zoloft she had at home approx 6 months ago(around September), Disp: , Rfl:   •  atorvastatin (LIPITOR) 20 MG tablet, Take 1 tablet by mouth Every Night., Disp: 90 tablet, Rfl: 1    The following portions of the patient's history were reviewed and updated as appropriate: allergies, current medications, past family history, past medical history, past social history, past surgical history and problem list.    Review of Systems  Review of Systems   Constitutional: Positive for fatigue and unexpected weight gain.   Skin:        mass   Psychiatric/Behavioral: Positive for dysphoric mood, sleep disturbance and depressed mood.       Objective:     /84 (BP Location: Left arm, Patient Position: Sitting, Cuff Size: Large  "Adult)   Pulse 86   Temp 97.1 °F (36.2 °C) (Infrared)   Ht 170.2 cm (67\")   Wt 121 kg (266 lb)   SpO2 97%   BMI 41.66 kg/m²     Physical Exam  Vitals signs reviewed.   Constitutional:       General: She is not in acute distress.     Appearance: She is well-developed. She is morbidly obese. She is not diaphoretic.   HENT:      Head: Normocephalic and atraumatic.   Cardiovascular:      Rate and Rhythm: Normal rate and regular rhythm.   Pulmonary:      Effort: Pulmonary effort is normal.      Breath sounds: Normal breath sounds.   Skin:     General: Skin is warm and dry.      Comments: Rubbery, oblong mass of left inner thigh.   Neurological:      Mental Status: She is alert and oriented to person, place, and time.   Psychiatric:         Mood and Affect: Mood is depressed. Affect is tearful.         Behavior: Behavior normal.         Lab Results   Component Value Date    WBC 8.36 08/31/2020    HGB 13.3 08/31/2020    HCT 41.0 08/31/2020    MCV 91.1 08/31/2020     08/31/2020     Lab Results   Component Value Date    GLUCOSE 100 (H) 08/31/2020    BUN 10 08/31/2020    CREATININE 0.67 08/31/2020    EGFRIFNONA 98 08/31/2020    BCR 14.9 08/31/2020    K 4.2 08/31/2020    CO2 25.0 08/31/2020    CALCIUM 9.4 08/31/2020    ALBUMIN 4.00 08/31/2020    LABIL2 0.8 (L) 01/08/2019    AST 14 08/31/2020    ALT 26 08/31/2020        Assessment/Plan:     Diagnoses and all orders for this visit:    1. Major depressive disorder, recurrent, moderate (CMS/HCC) (Primary)  Comments:  Will restart Wellbutrin at patient's request.  Taper off Zoloft.  Allow 4-6 weeks for medication to reach steady state.  Educational info in AVS.  Orders:  -     buPROPion XL (Wellbutrin XL) 150 MG 24 hr tablet; Take 1 tablet by mouth Daily.  Dispense: 30 tablet; Refill: 1  -     sertraline (Zoloft) 25 MG tablet; Take 1 tablet by mouth Daily.  Dispense: 30 tablet; Refill: 0    2. Class 3 severe obesity due to excess calories with serious comorbidity and " body mass index (BMI) of 40.0 to 44.9 in adult (CMS/[facility])  Comments:  Discussed health risks associated with obesity.  Encouraged 150 minutes of exercise weekly.  Will augment weight loss efforts with Wellbutrin.  Orders:  -     buPROPion XL (Wellbutrin XL) 150 MG 24 hr tablet; Take 1 tablet by mouth Daily.  Dispense: 30 tablet; Refill: 1  -     Ambulatory Referral to Nutrition Services    3. Lipoma of left lower extremity  Comments:  Will obtain imaging and consider referral to general surgery for potential excision.  Orders:  -     US Soft Tissue    Spent 30 minutes with patient.     Follow-up:     Return in about 8 weeks (around 4/26/2021) for Recheck mood.      Signature    Asiya Cooper MD  Family [facility]        This document has been electronically signed by Asiya Cooper MD on March 1, 2021 15:30 EST

## 2021-03-01 NOTE — PATIENT INSTRUCTIONS
Living With Depression  Everyone experiences occasional disappointment, sadness, and loss in their lives. When you are feeling down, blue, or sad for at least 2 weeks in a row, it may mean that you have depression. Depression can affect your thoughts and feelings, relationships, daily activities, and physical health. It is caused by changes in the way your brain functions. If you receive a diagnosis of depression, your health care provider will tell you which type of depression you have and what treatment options are available to you.  If you are living with depression, there are ways to help you recover from it and also ways to prevent it from coming back.  How to cope with lifestyle changes  Coping with stress         Stress is your body’s reaction to life changes and events, both good and bad. Stressful situations may include:  · Getting .  · The death of a spouse.  · Losing a job.  · Retiring.  · Having a baby.  Stress can last just a few hours or it can be ongoing. Stress can play a major role in depression, so it is important to learn both how to cope with stress and how to think about it differently.  Talk with your health care provider or a counselor if you would like to learn more about stress reduction. He or she may suggest some stress reduction techniques, such as:  · Music therapy. This can include creating music or listening to music. Choose music that you enjoy and that inspires you.  · Mindfulness-based meditation. This kind of meditation can be done while sitting or walking. It involves being aware of your normal breaths, rather than trying to control your breathing.  · Centering prayer. This is a kind of meditation that involves focusing on a spiritual word or phrase. Choose a word, phrase, or sacred image that is meaningful to you and that brings you peace.  · Deep breathing. To do this, expand your stomach and inhale slowly through your nose. Hold your breath for 3-5 seconds, then exhale  slowly, allowing your stomach muscles to relax.  · Muscle relaxation. This involves intentionally tensing muscles then relaxing them.  Choose a stress reduction technique that fits your lifestyle and personality. Stress reduction techniques take time and practice to develop. Set aside 5-15 minutes a day to do them. Therapists can offer training in these techniques. The training may be covered by some insurance plans. Other things you can do to manage stress include:  · Keeping a stress diary. This can help you learn what triggers your stress and ways to control your response.  · Understanding what your limits are and saying no to requests or events that lead to a schedule that is too full.  · Thinking about how you respond to certain situations. You may not be able to control everything, but you can control how you react.  · Adding humor to your life by watching funny films or TV shows.  · Making time for activities that help you relax and not feeling guilty about spending your time this way.    Medicines  Your health care provider may suggest certain medicines if he or she feels that they will help improve your condition. Avoid using alcohol and other substances that may prevent your medicines from working properly (may interact). It is also important to:  · Talk with your pharmacist or health care provider about all the medicines that you take, their possible side effects, and what medicines are safe to take together.  · Make it your goal to take part in all treatment decisions (shared decision-making). This includes giving input on the side effects of medicines. It is best if shared decision-making with your health care provider is part of your total treatment plan.  If your health care provider prescribes a medicine, you may not notice the full benefits of it for 4-8 weeks. Most people who are treated for depression need to be on medicine for at least 6-12 months after they feel better. If you are taking  medicines as part of your treatment, do not stop taking medicines without first talking to your health care provider. You may need to have the medicine slowly decreased (tapered) over time to decrease the risk of harmful side effects.  Relationships  Your health care provider may suggest family therapy along with individual therapy and drug therapy. While there may not be family problems that are causing you to feel depressed, it is still important to make sure your family learns as much as they can about your mental health. Having your family’s support can help make your treatment successful.  How to recognize changes in your condition  Everyone has a different response to treatment for depression. Recovery from major depression happens when you have not had signs of major depression for two months. This may mean that you will start to:  · Have more interest in doing activities.  · Feel less hopeless than you did 2 months ago.  · Have more energy.  · Overeat less often, or have better or improving appetite.  · Have better concentration.  Your health care provider will work with you to decide the next steps in your recovery. It is also important to recognize when your condition is getting worse. Watch for these signs:  · Having fatigue or low energy.  · Eating too much or too little.  · Sleeping too much or too little.  · Feeling restless, agitated, or hopeless.  · Having trouble concentrating or making decisions.  · Having unexplained physical complaints.  · Feeling irritable, angry, or aggressive.  Get help as soon as you or your family members notice these symptoms coming back.  How to get support and help from others  How to talk with friends and family members about your condition    Talking to friends and family members about your condition can provide you with one way to get support and guidance. Reach out to trusted friends or family members, explain your symptoms to them, and let them know that you are  working with a health care provider to treat your depression.  Financial resources  Not all insurance plans cover mental health care, so it is important to check with your insurance carrier. If paying for co-pays or counseling services is a problem, search for a local or [facility] care center. They may be able to offer public mental health care services at low or no cost when you are not able to see a private health care provider.  If you are taking medicine for depression, you may be able to get the generic form, which may be less expensive. Some makers of prescription medicines also offer help to patients who cannot afford the medicines they need.  Follow these instructions at home:    · Get the right amount and quality of sleep.  · Cut down on using caffeine, tobacco, alcohol, and other potentially harmful substances.  · Try to exercise, such as walking or lifting small weights.  · Take over-the-counter and prescription medicines only as told by your health care provider.  · Eat a healthy diet that includes plenty of vegetables, fruits, whole grains, low-fat dairy products, and lean protein. Do not eat a lot of foods that are high in solid fats, added sugars, or salt.  · Keep all follow-up visits as told by your health care provider. This is important.  Contact a health care provider if:  · You stop taking your antidepressant medicines, and you have any of these symptoms:  ? Nausea.  ? Headache.  ? Feeling lightheaded.  ? Chills and body aches.  ? Not being able to sleep (insomnia).  · You or your friends and family think your depression is getting worse.  Get help right away if:  · You have thoughts of hurting yourself or others.  If you ever feel like you may hurt yourself or others, or have thoughts about taking your own life, get help right away. You can go to your nearest emergency department or call:  · Your local emergency services (911 in the [place]  · A suicide crisis helpline, such as the  National Suicide Prevention Lifeline at 1-740.675.9888. This is open 24-hours a day.  Summary  · If you are living with depression, there are ways to help you recover from it and also ways to prevent it from coming back.  · Work with your health care team to create a management plan that includes counseling, stress management techniques, and healthy lifestyle habits.  This information is not intended to replace advice given to you by your health care provider. Make sure you discuss any questions you have with your health care provider.  Document Revised: 04/10/2020 Document Reviewed: 11/20/2017  [employer] Patient Education © 2020 [employer]    Calorie Counting for Weight Loss  Calories are units of energy. Your body needs a certain amount of calories from food to keep you going throughout the day. When you eat more calories than your body needs, your body stores the extra calories as fat. When you eat fewer calories than your body needs, your body burns fat to get the energy it needs.  Calorie counting means keeping track of how many calories you eat and drink each day. Calorie counting can be helpful if you need to lose weight. If you make sure to eat fewer calories than your body needs, you should lose weight. Ask your health care provider what a healthy weight is for you.  For calorie counting to work, you will need to eat the right number of calories in a day in order to lose a healthy amount of weight per week. A dietitian can help you determine how many calories you need in a day and will give you suggestions on how to reach your calorie goal.  · A healthy amount of weight to lose per week is usually 1-2 lb (0.5-0.9 kg). This usually means that your daily calorie intake should be reduced by 500-750 calories.  · Eating 1,200 - 1,500 calories per day can help most women lose weight.  · Eating 1,500 - 1,800 calories per day can help most men lose weight.  What is my plan?  My goal is to have __________ calories  per day.  If I have this many calories per day, I should lose around __________ pounds per week.  What do I need to know about calorie counting?  In order to meet your daily calorie goal, you will need to:  · Find out how many calories are in each food you would like to eat. Try to do this before you eat.  · Decide how much of the food you plan to eat.  · Write down what you ate and how many calories it had. Doing this is called keeping a food log.  To successfully lose weight, it is important to balance calorie counting with a healthy lifestyle that includes regular activity. Aim for 150 minutes of moderate exercise (such as walking) or 75 minutes of vigorous exercise (such as running) each week.  Where do I find calorie information?    The number of calories in a food can be found on a Nutrition Facts label. If a food does not have a Nutrition Facts label, try to look up the calories online or ask your dietitian for help.  Remember that calories are listed per serving. If you choose to have more than one serving of a food, you will have to multiply the calories per serving by the amount of servings you plan to eat. For example, the label on a package of bread might say that a serving size is 1 slice and that there are 90 calories in a serving. If you eat 1 slice, you will have eaten 90 calories. If you eat 2 slices, you will have eaten 180 calories.  How do I keep a food log?  Immediately after each meal, record the following information in your food log:  · What you ate. Don't forget to include toppings, sauces, and other extras on the food.  · How much you ate. This can be measured in cups, ounces, or number of items.  · How many calories each food and drink had.  · The total number of calories in the meal.  Keep your food log near you, such as in a small notebook in your pocket, or use a mobile julian or website. Some programs will calculate calories for you and show you how many calories you have left for the day  "to meet your goal.  What are some calorie counting tips?    · Use your calories on foods and drinks that will fill you up and not leave you hungry:  ? Some examples of foods that fill you up are nuts and nut butters, vegetables, lean proteins, and high-fiber foods like whole grains. High-fiber foods are foods with more than 5 g fiber per serving.  ? Drinks such as sodas, specialty coffee drinks, alcohol, and juices have a lot of calories, yet do not fill you up.  · Eat nutritious foods and avoid empty calories. Empty calories are calories you get from foods or beverages that do not have many vitamins or protein, such as candy, sweets, and soda. It is better to have a nutritious high-calorie food (such as an avocado) than a food with few nutrients (such as a bag of chips).  · Know how many calories are in the foods you eat most often. This will help you calculate calorie counts faster.  · Pay attention to calories in drinks. Low-calorie drinks include water and unsweetened drinks.  · Pay attention to nutrition labels for \"low fat\" or \"fat free\" foods. These foods sometimes have the same amount of calories or more calories than the full fat versions. They also often have added sugar, starch, or salt, to make up for flavor that was removed with the fat.  · Find a way of tracking calories that works for you. Get creative. Try different apps or programs if writing down calories does not work for you.  What are some portion control tips?  · Know how many calories are in a serving. This will help you know how many servings of a certain food you can have.  · Use a measuring cup to measure serving sizes. You could also try weighing out portions on a kitchen scale. With time, you will be able to estimate serving sizes for some foods.  · Take some time to put servings of different foods on your favorite plates, bowls, and cups so you know what a serving looks like.  · Try not to eat straight from a bag or box. Doing this can " "lead to overeating. Put the amount you would like to eat in a cup or on a plate to make sure you are eating the right portion.  · Use smaller plates, glasses, and bowls to prevent overeating.  · Try not to multitask (for example, watch TV or use your computer) while eating. If it is time to eat, sit down at a table and enjoy your food. This will help you to know when you are full. It will also help you to be aware of what you are eating and how much you are eating.  What are tips for following this plan?  Reading food labels  · Check the calorie count compared to the serving size. The serving size may be smaller than what you are used to eating.  · Check the source of the calories. Make sure the food you are eating is high in vitamins and protein and low in saturated and trans fats.  Shopping  · Read nutrition labels while you shop. This will help you make healthy decisions before you decide to purchase your food.  · Make a grocery list and stick to it.  Cooking  · Try to cook your favorite foods in a healthier way. For example, try baking instead of frying.  · Use low-fat dairy products.  Meal planning  · Use more fruits and vegetables. Half of your plate should be fruits and vegetables.  · Include lean proteins like poultry and fish.  How do I count calories when eating out?  · Ask for smaller portion sizes.  · Consider sharing an entree and sides instead of getting your own entree.  · If you get your own entree, eat only half. Ask for a box at the beginning of your meal and put the rest of your entree in it so you are not tempted to eat it.  · If calories are listed on the menu, choose the lower calorie options.  · Choose dishes that include vegetables, fruits, whole grains, low-fat dairy products, and lean protein.  · Choose items that are boiled, broiled, grilled, or steamed. Stay away from items that are buttered, battered, fried, or served with cream sauce. Items labeled \"crispy\" are usually fried, unless " stated otherwise.  · Choose water, low-fat milk, unsweetened iced tea, or other drinks without added sugar. If you want an alcoholic beverage, choose a lower calorie option such as a glass of wine or light beer.  · Ask for dressings, sauces, and syrups on the side. These are usually high in calories, so you should limit the amount you eat.  · If you want a salad, choose a garden salad and ask for grilled meats. Avoid extra toppings like yuan, cheese, or fried items. Ask for the dressing on the side, or ask for olive oil and vinegar or lemon to use as dressing.  · Estimate how many servings of a food you are given. For example, a serving of cooked rice is ½ cup or about the size of half a baseball. Knowing serving sizes will help you be aware of how much food you are eating at restaurants. The list below tells you how big or small some common portion sizes are based on everyday objects:  ? 1 oz--4 stacked dice.  ? 3 oz--1 deck of cards.  ? 1 tsp--1 die.  ? 1 Tbsp--½ a ping-pong ball.  ? 2 Tbsp--1 ping-pong ball.  ? ½ cup--½ baseball.  ? 1 cup--1 baseball.  Summary  · Calorie counting means keeping track of how many calories you eat and drink each day. If you eat fewer calories than your body needs, you should lose weight.  · A healthy amount of weight to lose per week is usually 1-2 lb (0.5-0.9 kg). This usually means reducing your daily calorie intake by 500-750 calories.  · The number of calories in a food can be found on a Nutrition Facts label. If a food does not have a Nutrition Facts label, try to look up the calories online or ask your dietitian for help.  · Use your calories on foods and drinks that will fill you up, and not on foods and drinks that will leave you hungry.  · Use smaller plates, glasses, and bowls to prevent overeating.  This information is not intended to replace advice given to you by your health care provider. Make sure you discuss any questions you have with your health care  provider.  Document Revised: 09/06/2019 Document Reviewed: 11/17/2017  [employer] Patient Education © 2020 [employer]    Exercising to Lose Weight  Exercise is structured, repetitive physical activity to improve fitness and health. Getting regular exercise is important for everyone. It is especially important if you are overweight. Being overweight increases your risk of heart disease, stroke, diabetes, high blood pressure, and several types of cancer. Reducing your calorie intake and exercising can help you lose weight.  Exercise is usually categorized as moderate or vigorous intensity. To lose weight, most people need to do a certain amount of moderate-intensity or vigorous-intensity exercise each week.  Moderate-intensity exercise    Moderate-intensity exercise is any activity that gets you moving enough to burn at least three times more energy (calories) than if you were sitting.  Examples of moderate exercise include:  · Walking a mile in 15 minutes.  · Doing light yard work.  · Biking at an easy pace.  Most people should get at least 150 minutes (2 hours and 30 minutes) a week of moderate-intensity exercise to maintain their body weight.  Vigorous-intensity exercise  Vigorous-intensity exercise is any activity that gets you moving enough to burn at least six times more calories than if you were sitting. When you exercise at this intensity, you should be working hard enough that you are not able to carry on a conversation.  Examples of vigorous exercise include:  · Running.  · Playing a team sport, such as football, basketball, and soccer.  · Jumping rope.  Most people should get at least 75 minutes (1 hour and 15 minutes) a week of vigorous-intensity exercise to maintain their body weight.  How can exercise affect me?  When you exercise enough to burn more calories than you eat, you lose weight. Exercise also reduces body fat and builds muscle. The more muscle you have, the more calories you burn. Exercise  also:  · Improves mood.  · Reduces stress and tension.  · Improves your overall fitness, flexibility, and endurance.  · Increases bone strength.  The amount of exercise you need to lose weight depends on:  · Your age.  · The type of exercise.  · Any health conditions you have.  · Your overall physical ability.  Talk to your health care provider about how much exercise you need and what types of activities are safe for you.  What actions can I take to lose weight?  Nutrition    · Make changes to your diet as told by your health care provider or diet and nutrition specialist (dietitian). This may include:  ? Eating fewer calories.  ? Eating more protein.  ? Eating less unhealthy fats.  ? Eating a diet that includes fresh fruits and vegetables, whole grains, low-fat dairy products, and lean protein.  ? Avoiding foods with added fat, salt, and sugar.  · Drink plenty of water while you exercise to prevent dehydration or heat stroke.  Activity  · Choose an activity that you enjoy and set realistic goals. Your health care provider can help you make an exercise plan that works for you.  · Exercise at a moderate or vigorous intensity most days of the week.  ? The intensity of exercise may vary from person to person. You can tell how intense a workout is for you by paying attention to your breathing and heartbeat. Most people will notice their breathing and heartbeat get faster with more intense exercise.  · Do resistance training twice each week, such as:  ? Push-ups.  ? Sit-ups.  ? Lifting weights.  ? Using resistance bands.  · Getting short amounts of exercise can be just as helpful as long structured periods of exercise. If you have trouble finding time to exercise, try to include exercise in your daily routine.  ? Get up, stretch, and walk around every 30 minutes throughout the day.  ? Go for a walk during your lunch break.  ? Park your car farther away from your destination.  ? If you take public transportation, get off  one stop early and walk the rest of the way.  ? Make phone calls while standing up and walking around.  ? Take the stairs instead of elevators or escalators.  · Wear comfortable clothes and shoes with good support.  · Do not exercise so much that you hurt yourself, feel dizzy, or get very short of breath.  Where to find more information  · [place] Department of Health and Human Services: www.hhs.gov  · Centers for Disease Control and Prevention (CDC): www.cdc.gov  Contact a health care provider:  · Before starting a new exercise program.  · If you have questions or concerns about your weight.  · If you have a medical problem that keeps you from exercising.  Get help right away if you have any of the following while exercising:  · Injury.  · Dizziness.  · Difficulty breathing or shortness of breath that does not go away when you stop exercising.  · Chest pain.  · Rapid heartbeat.  Summary  · Being overweight increases your risk of heart disease, stroke, diabetes, high blood pressure, and several types of cancer.  · Losing weight happens when you burn more calories than you eat.  · Reducing the amount of calories you eat in addition to getting regular moderate or vigorous exercise each week helps you lose weight.  This information is not intended to replace advice given to you by your health care provider. Make sure you discuss any questions you have with your health care provider.  Document Revised: 12/31/2018 Document Reviewed: 12/31/2018  [employer] Patient Education © 2020 [employer]

## 2021-03-15 ENCOUNTER — HOSPITAL ENCOUNTER (OUTPATIENT)
Dept: ULTRASOUND IMAGING | Facility: HOSPITAL | Age: 41
Discharge: HOME | End: 2021-03-15
Admitting: FAMILY MEDICINE

## 2021-03-15 PROCEDURE — 76999 ECHO EXAMINATION PROCEDURE: CPT

## 2021-03-16 DIAGNOSIS — D17.24 LIPOMA OF LEFT LOWER EXTREMITY: Primary | ICD-10-CM

## 2021-03-26 ENCOUNTER — HOSPITAL ENCOUNTER (OUTPATIENT)
Dept: INTERVENTIONAL RADIOLOGY/VASCULAR | Facility: HOSPITAL | Age: 41
End: 2021-03-26

## 2021-03-26 DIAGNOSIS — J30.2 SEASONAL ALLERGIES: ICD-10-CM

## 2021-03-26 RX ORDER — FLUTICASONE PROPIONATE 50 MCG
1 SPRAY, SUSPENSION (ML) NASAL DAILY
Qty: 48 G | Refills: 1 | Status: SHIPPED | OUTPATIENT
Start: 2021-03-26 | End: 2021-06-25

## 2021-03-29 ENCOUNTER — TELEPHONE (OUTPATIENT)
Dept: FAMILY MEDICINE CLINIC | Facility: CLINIC | Age: 41
End: 2021-03-29

## 2021-03-29 DIAGNOSIS — F33.1 MAJOR DEPRESSIVE DISORDER, RECURRENT, MODERATE (HCC): ICD-10-CM

## 2021-03-29 RX ORDER — SERTRALINE HYDROCHLORIDE 25 MG/1
25 TABLET, FILM COATED ORAL DAILY
Qty: 30 TABLET | Refills: 0 | OUTPATIENT
Start: 2021-03-29

## 2021-03-29 NOTE — TELEPHONE ENCOUNTER
Please call patient and let her know that we got a refill request for Zoloft 25 mg nightly.  I was under the impression that she was tapering off this medication and starting Wellbutrin instead.  Is that correct?

## 2021-03-30 ENCOUNTER — TELEPHONE (OUTPATIENT)
Dept: FAMILY MEDICINE CLINIC | Facility: CLINIC | Age: 41
End: 2021-03-30

## 2021-03-30 NOTE — TELEPHONE ENCOUNTER
Caller: Hyun Perez    Relationship: Self    Best call back number:[phone]    What is the best time to reach you: ANY    Who are you requesting to speak with (clinical staff, provider,  specific staff member): CLINICAL STAFF    Do you know the name of the person who called: BHARGAVI    What was the call regarding: [employer] AUTOMATED SYSTEM IS OUT OF CONTROL PER PATIENT.  SHE DIDN'T REQUEST ZOLOFT.    Do you require a callback: NO

## 2021-04-06 ENCOUNTER — HOSPITAL ENCOUNTER (OUTPATIENT)
Dept: INTERVENTIONAL RADIOLOGY/VASCULAR | Facility: HOSPITAL | Age: 41
Discharge: HOME | End: 2021-04-06
Admitting: FAMILY MEDICINE

## 2021-04-06 VITALS — WEIGHT: 240 LBS | HEIGHT: 67 IN | BODY MASS INDEX: 37.67 KG/M2

## 2021-04-06 DIAGNOSIS — D17.24 LIPOMA OF LEFT LOWER EXTREMITY: ICD-10-CM

## 2021-04-06 PROCEDURE — 25010000003 LIDOCAINE 1 % SOLUTION: Performed by: RADIOLOGY

## 2021-04-06 PROCEDURE — 76942 ECHO GUIDE FOR BIOPSY: CPT

## 2021-04-06 PROCEDURE — 88307 TISSUE EXAM BY PATHOLOGIST: CPT | Performed by: FAMILY MEDICINE

## 2021-04-06 RX ORDER — OMEPRAZOLE 40 MG/1
40 CAPSULE, DELAYED RELEASE ORAL DAILY
COMMUNITY

## 2021-04-06 RX ORDER — LIDOCAINE HYDROCHLORIDE 10 MG/ML
INJECTION, SOLUTION INFILTRATION; PERINEURAL
Status: COMPLETED | OUTPATIENT
Start: 2021-04-06 | End: 2021-04-06

## 2021-04-06 RX ADMIN — LIDOCAINE HYDROCHLORIDE 5 ML: 10 INJECTION, SOLUTION INFILTRATION; PERINEURAL at 15:57

## 2021-04-06 NOTE — DISCHARGE INSTRUCTIONS
Take it easy at home.  No lifting more than 10 lbs for 48 hours.  Keep dressing clean and dry, it can be removed in two days..  Seek immediate medical attention for sudden and severe pain, shortness of air or racing heartbeat.  If dressing bleeds, hold firm but gentle pressure for about five minutes until it stops.  Look for signs of infection and report these to your physician immediately.     Unremarkable

## 2021-04-07 LAB
LAB AP CASE REPORT: NORMAL
LAB AP DIAGNOSIS COMMENT: NORMAL
PATH REPORT.FINAL DX SPEC: NORMAL
PATH REPORT.GROSS SPEC: NORMAL

## 2021-04-09 ENCOUNTER — OFFICE VISIT (OUTPATIENT)
Dept: BARIATRICS/WEIGHT MGMT | Facility: CLINIC | Age: 41
End: 2021-04-09

## 2021-04-09 VITALS — HEIGHT: 67 IN | WEIGHT: 256.6 LBS | BODY MASS INDEX: 40.27 KG/M2

## 2021-04-09 DIAGNOSIS — E66.01 OBESITY, CLASS III, BMI 40-49.9 (MORBID OBESITY) (HCC): ICD-10-CM

## 2021-04-09 DIAGNOSIS — E66.01 CLASS 3 SEVERE OBESITY DUE TO EXCESS CALORIES WITH SERIOUS COMORBIDITY AND BODY MASS INDEX (BMI) OF 40.0 TO 44.9 IN ADULT (HCC): ICD-10-CM

## 2021-04-09 PROCEDURE — 97802 MEDICAL NUTRITION INDIV IN: CPT | Performed by: DIETITIAN, REGISTERED

## 2021-04-09 NOTE — PROGRESS NOTES
Chief Complaint   Patient presents with   • Obesity   • Nutrition Counseling      The patient is here for weight loss visit. Pt has done a low carb diet before but did not like it. Works from home & lives alone. Pt c/o gaining a lot of weight in the last 5 years and especially this last year.    24 hour recall: wakes up about 11 am   Breakfast: smoothie made with frozen fruit, spinach and eduardo seeds and protein powder and coconut water  Lunch: Mexican taco, chips and salsa  Supper: 10 pm meal delivery such as [employer]   Snacks: pickles and olives, cheese  Beverages: 2 pm espresso with milk and lite syrup, water, wine 2 times per week (1 glass to one bottle     PA: used to walk dogs but they are too big now and rough to walk    Vitals:        BP: NA    Pulse: NA      Patient Active Problem List   Diagnosis   • Obesity          Body mass index is 40.19  kg/m².  Vitals       Documented weight        Weight: 256.6 pounds        Discussion/Plan:  Obesity/Morbid Obesity: I reviewed the appropriate dietary choices with the patient and encouraged the necessary changes. Recommended 1800 calories and 50-55 grams of fat per day. Discussed the option of keeping a food journal which will help patient become more aware of the nutritional value of foods.      The patient was given written materials from our office for education.     I answered all of the patients questions regarding dietary changes, exercise or surgical options.    The patient will follow up in one month on 5/7/21 at 1:30 pm.     PES: Food and nutrition related knowledge deficit RT uncertainty how to apply nutrition information AEB needed education for guidelines for weight loss.       The total time spent face to face was 30 minutes with 30 minutes spent counseling.           GOALS:   1.) Set alert or timer on smart phone to remind yourself to eat every 4-6 hours  2.) Keep food journal such as [employer] julian   3.) Hike once a week with one dog.   4.) Lose 10  pounds (4-8 pounds by next month).

## 2021-04-16 ENCOUNTER — TELEPHONE (OUTPATIENT)
Dept: FAMILY MEDICINE CLINIC | Facility: CLINIC | Age: 41
End: 2021-04-16

## 2021-04-16 DIAGNOSIS — F41.1 GENERALIZED ANXIETY DISORDER WITH PANIC ATTACKS: ICD-10-CM

## 2021-04-16 DIAGNOSIS — D17.24 LIPOMA OF LEFT LOWER EXTREMITY: Primary | ICD-10-CM

## 2021-04-16 DIAGNOSIS — F41.0 GENERALIZED ANXIETY DISORDER WITH PANIC ATTACKS: ICD-10-CM

## 2021-04-16 RX ORDER — HYDROXYZINE HYDROCHLORIDE 25 MG/1
25 TABLET, FILM COATED ORAL 3 TIMES DAILY PRN
Qty: 30 TABLET | Refills: 1 | Status: SHIPPED | OUTPATIENT
Start: 2021-04-16 | End: 2021-04-26 | Stop reason: SINTOL

## 2021-04-16 NOTE — TELEPHONE ENCOUNTER
PT HAD A BIOPSY THAT CAME BACK NEGATIVE ON A SPOT ON HER THIGH. SHE IS REQUESTING A CALL BACK TO SEE WHAT IS THE NEXT STEP.     SHE IS ALSO HAVING SEVERE ANXIETY FOR THE PAST WEEK.

## 2021-04-16 NOTE — TELEPHONE ENCOUNTER
Please call patient and let her know that I put in referral to general surgery.    I have sent a prescription for hydroxyzine to her pharmacy.  She can take up to 3 times a day as needed for anxiety attacks.

## 2021-04-16 NOTE — TELEPHONE ENCOUNTER
Please call patient and let her know that the lump on her inner thigh is a benign fatty tumor.  We typically leave these alone.  If she wants it removed I can refer her to a general surgeon.

## 2021-04-16 NOTE — TELEPHONE ENCOUNTER
Yes, she would like referral.   For the past 4 or 5 days she has had nonstop panic attacks  Taking wellbutrin- 1 tab in the am

## 2021-04-26 ENCOUNTER — OFFICE VISIT (OUTPATIENT)
Dept: FAMILY MEDICINE CLINIC | Facility: CLINIC | Age: 41
End: 2021-04-26

## 2021-04-26 VITALS
TEMPERATURE: 97.1 F | HEIGHT: 67 IN | SYSTOLIC BLOOD PRESSURE: 137 MMHG | WEIGHT: 254 LBS | OXYGEN SATURATION: 98 % | HEART RATE: 105 BPM | BODY MASS INDEX: 39.87 KG/M2 | DIASTOLIC BLOOD PRESSURE: 80 MMHG

## 2021-04-26 DIAGNOSIS — E66.01 CLASS 3 SEVERE OBESITY DUE TO EXCESS CALORIES WITH SERIOUS COMORBIDITY AND BODY MASS INDEX (BMI) OF 40.0 TO 44.9 IN ADULT (HCC): ICD-10-CM

## 2021-04-26 DIAGNOSIS — R35.0 FREQUENT URINATION: ICD-10-CM

## 2021-04-26 DIAGNOSIS — F33.1 MAJOR DEPRESSIVE DISORDER, RECURRENT, MODERATE (HCC): Primary | ICD-10-CM

## 2021-04-26 DIAGNOSIS — F33.1 MAJOR DEPRESSIVE DISORDER, RECURRENT, MODERATE (HCC): ICD-10-CM

## 2021-04-26 LAB
BILIRUB BLD-MCNC: NEGATIVE MG/DL
CLARITY, POC: CLEAR
COLOR UR: YELLOW
GLUCOSE UR STRIP-MCNC: NEGATIVE MG/DL
KETONES UR QL: NEGATIVE
LEUKOCYTE EST, POC: NEGATIVE
NITRITE UR-MCNC: NEGATIVE MG/ML
PH UR: 5.5 [PH] (ref 5–8)
PROT UR STRIP-MCNC: NEGATIVE MG/DL
RBC # UR STRIP: NEGATIVE /UL
SP GR UR: 1.02 (ref 1–1.03)
UROBILINOGEN UR QL: NORMAL

## 2021-04-26 PROCEDURE — 99213 OFFICE O/P EST LOW 20 MIN: CPT | Performed by: FAMILY MEDICINE

## 2021-04-26 RX ORDER — BUPROPION HYDROCHLORIDE 300 MG/1
300 TABLET ORAL DAILY
Qty: 90 TABLET | Refills: 1 | Status: SHIPPED | OUTPATIENT
Start: 2021-04-26 | End: 2021-09-22 | Stop reason: SDUPTHER

## 2021-04-26 RX ORDER — BUPROPION HYDROCHLORIDE 150 MG/1
150 TABLET ORAL DAILY
Qty: 30 TABLET | Refills: 1 | OUTPATIENT
Start: 2021-04-26

## 2021-04-26 NOTE — PROGRESS NOTES
Subjective:     Hyun Perez is a 41 y.o. female who presents for  Chief Complaint   Patient presents with   • Depression     follow up    • Urinary Frequency     frequent urination- more at night      Morbidly obese  female with CMHx of depression dating back to teenage years presents for follow up. Restarted Wellbutrin 150 mg at her last visit. Tolerating it well. Requesting increase in dose of Wellbutrin. Panic attacks have improved. Discontinued Atarax due to headaches.     Complaining of frequent urination in the last few weeks. Drinks water well up to bedtime. Drinks coffee with 4 shots of espresso daily. Eats a lot of pickles at night.     Past Medical Hx:  Past Medical History:   Diagnosis Date   • Anxiety        Past Surgical Hx:  Past Surgical History:   Procedure Laterality Date   • LEEP  1997   • TUBAL ABDOMINAL LIGATION Bilateral 2015       Family Hx:  Family History   Problem Relation Age of Onset   • Hypertension Mother    • Coronary artery disease Mother         CABG x4 in late 30s   • Depression Mother         mother committed suicide   • Hypertension Maternal Grandmother    • Diabetes Maternal Grandmother    • Depression Maternal Grandmother         committed suicide   • Hypertension Maternal Grandfather    • Diabetes Maternal Grandfather    • Heart attack Maternal Grandfather    • Depression Maternal Cousin         committed suicide   • Depression Maternal Aunt         committed suicide        Social History:  Social History     Socioeconomic History   • Marital status: Single     Spouse name: Not on file   • Number of children: Not on file   • Years of education: Not on file   • Highest education level: Not on file   Tobacco Use   • Smoking status: Former Smoker   • Smokeless tobacco: Never Used   Substance and Sexual Activity   • Alcohol use: Yes   • Drug use: Not Currently       Allergies:  Banana, Kiwi extract, Penicillin v potassium, and Milk thistle    Current Meds:    Current  "Outpatient Medications:   •  buPROPion XL (Wellbutrin XL) 150 MG 24 hr tablet, Take 1 tablet by mouth Daily., Disp: 30 tablet, Rfl: 1  •  fluticasone (FLONASE) 50 MCG/ACT nasal spray, 1 spray into the nostril(s) as directed by provider Daily. DO NOT SNIFF!, Disp: 48 g, Rfl: 1  •  lisinopril (PRINIVIL,ZESTRIL) 10 MG tablet, Take 1 tablet by mouth Daily., Disp: 90 tablet, Rfl: 1  •  montelukast (SINGULAIR) 10 MG tablet, Take 1 tablet by mouth Every Night., Disp: 90 tablet, Rfl: 1  •  omeprazole (priLOSEC) 40 MG capsule, Take 40 mg by mouth Daily., Disp: , Rfl:   •  sertraline (Zoloft) 25 MG tablet, Take 1 tablet by mouth Daily., Disp: 30 tablet, Rfl: 0  •  atorvastatin (LIPITOR) 20 MG tablet, Take 1 tablet by mouth Every Night., Disp: 90 tablet, Rfl: 1  •  hydrOXYzine (ATARAX) 25 MG tablet, Take 1 tablet by mouth 3 (Three) Times a Day As Needed for Anxiety., Disp: 30 tablet, Rfl: 1    The following portions of the patient's history were reviewed and updated as appropriate: allergies, current medications, past family history, past medical history, past social history, past surgical history and problem list.    Review of Systems  Review of Systems   Genitourinary: Positive for frequency.   Psychiatric/Behavioral: Positive for dysphoric mood. The patient is nervous/anxious.        Objective:     /80 (BP Location: Left arm, Patient Position: Sitting, Cuff Size: Large Adult)   Pulse 105   Temp 97.1 °F (36.2 °C) (Infrared)   Ht 170.2 cm (67\")   Wt 115 kg (254 lb)   SpO2 98%   BMI 39.78 kg/m²     Physical Exam  Vitals reviewed.   Constitutional:       General: She is not in acute distress.     Appearance: She is well-developed. She is obese. She is not diaphoretic.   HENT:      Head: Normocephalic and atraumatic.   Cardiovascular:      Rate and Rhythm: Normal rate and regular rhythm.   Pulmonary:      Effort: Pulmonary effort is normal.      Breath sounds: Normal breath sounds.   Skin:     General: Skin is warm " and dry.   Neurological:      Mental Status: She is alert and oriented to person, place, and time.   Psychiatric:         Mood and Affect: Mood normal.         Behavior: Behavior normal.         Lab Results   Component Value Date    WBC 8.36 08/31/2020    HGB 13.3 08/31/2020    HCT 41.0 08/31/2020    MCV 91.1 08/31/2020     08/31/2020     Lab Results   Component Value Date    GLUCOSE 100 (H) 08/31/2020    BUN 10 08/31/2020    CREATININE 0.67 08/31/2020    EGFRIFNONA 98 08/31/2020    BCR 14.9 08/31/2020    K 4.2 08/31/2020    CO2 25.0 08/31/2020    CALCIUM 9.4 08/31/2020    ALBUMIN 4.00 08/31/2020    LABIL2 0.8 (L) 01/08/2019    AST 14 08/31/2020    ALT 26 08/31/2020        Assessment/Plan:     Problem List Items Addressed This Visit        Mental Health    Major depressive disorder, recurrent, moderate (CMS/HCC) - Primary    Overview     Plan to increase dose of Wellbutrin 300 mg daily.  RTO if symptoms worsen.         Relevant Medications    buPROPion XL (Wellbutrin XL) 300 MG 24 hr tablet      Other Visit Diagnoses     Frequent urination        Negative urinalysis.  Discussed dietary and lifestyle modifications.  Will refer to urology for further evaluation & recommendation.    Relevant Orders    POC Urinalysis Dipstick, Automated (Completed)    Ambulatory Referral to Urology        Follow-up:     Return in about 5 months (around 9/26/2021) for Annual Physical Exam.      Signature    Asiya Cooper MD  Family Medicine  [facility]        This document has been electronically signed by Asiya Cooper MD on April 26, 2021 14:30 EDT

## 2021-05-07 ENCOUNTER — OFFICE VISIT (OUTPATIENT)
Dept: BARIATRICS/WEIGHT MGMT | Facility: CLINIC | Age: 41
End: 2021-05-07

## 2021-05-07 VITALS — WEIGHT: 255.2 LBS | BODY MASS INDEX: 39.97 KG/M2

## 2021-05-07 DIAGNOSIS — E66.9 OBESITY, CLASS II, BMI 35-39.9: ICD-10-CM

## 2021-05-07 PROCEDURE — 97802 MEDICAL NUTRITION INDIV IN: CPT | Performed by: DIETITIAN, REGISTERED

## 2021-05-07 NOTE — PROGRESS NOTES
The patient is here for supervised weight loss visit. Has partially met following goals: 1) pt states she is trying to eat more frequently; 2.) pt keeping food journal for the last 2 weeks; 3.) see below; 4.) lost 1.4 pounds.  Pt c/o a lot of stress, anxiety and depression lately. Her mom killed herself 5 years ago.  Pt cried during most of session    PA: none     24 hr recall:  Breakfast: smoothies  Lunch: bean soup and greens or pre-prepared meal  Dinner: meal delivery service or salad  Snacks: bread, cereal, pickles  Drinks: water, espresso and no wine     Review of Systems   HR NA     BP NA   Patient Active Problem List   Diagnosis   • Obesity         Vitals   Wt: 255.6 pounds     Weight change from last appointment:-1.4 lb     Discussion/Plan:  Obesity/Morbid Obesity: I reviewed the appropriate dietary choices with the patient and encouraged the necessary changes. Discussed importance of self-care.     I answered all of the patients questions regarding dietary changes & exercise.       PES: Food and nutrition related knowledge deficit RT uncertainty how to apply nutrition information AEB needed education for guidelines for weight management.      The total time spent face to face was 15 minutes with 15 minutes spent counseling.          Goals:  1.) Find something that makes you happy.   2.) Work on doing more physical activity.   3.) Don't be afraid to ask for help.

## 2021-06-25 DIAGNOSIS — J30.2 SEASONAL ALLERGIES: ICD-10-CM

## 2021-06-25 RX ORDER — FLUTICASONE PROPIONATE 50 MCG
1 SPRAY, SUSPENSION (ML) NASAL DAILY
Qty: 48 G | Refills: 1 | Status: SHIPPED | OUTPATIENT
Start: 2021-06-25 | End: 2021-12-20

## 2021-08-21 DIAGNOSIS — J30.2 SEASONAL ALLERGIES: ICD-10-CM

## 2021-08-21 DIAGNOSIS — I10 ESSENTIAL HYPERTENSION: ICD-10-CM

## 2021-08-23 RX ORDER — MONTELUKAST SODIUM 10 MG/1
10 TABLET ORAL NIGHTLY
Qty: 90 TABLET | Refills: 1 | Status: SHIPPED | OUTPATIENT
Start: 2021-08-23 | End: 2021-09-22

## 2021-08-23 RX ORDER — LISINOPRIL 10 MG/1
10 TABLET ORAL DAILY
Qty: 90 TABLET | Refills: 1 | Status: SHIPPED | OUTPATIENT
Start: 2021-08-23 | End: 2022-02-25

## 2021-08-29 ENCOUNTER — APPOINTMENT (OUTPATIENT)
Dept: NUCLEAR MEDICINE | Facility: HOSPITAL | Age: 41
End: 2021-08-29

## 2021-08-29 ENCOUNTER — ON CAMPUS - OUTPATIENT (OUTPATIENT)
Dept: URBAN - METROPOLITAN AREA HOSPITAL 85 | Facility: HOSPITAL | Age: 41
End: 2021-08-29
Payer: COMMERCIAL

## 2021-08-29 ENCOUNTER — READMISSION MANAGEMENT (OUTPATIENT)
Dept: CALL CENTER | Facility: HOSPITAL | Age: 41
End: 2021-08-29

## 2021-08-29 ENCOUNTER — APPOINTMENT (OUTPATIENT)
Dept: ULTRASOUND IMAGING | Facility: HOSPITAL | Age: 41
End: 2021-08-29

## 2021-08-29 ENCOUNTER — APPOINTMENT (OUTPATIENT)
Dept: GENERAL RADIOLOGY | Facility: HOSPITAL | Age: 41
End: 2021-08-29

## 2021-08-29 ENCOUNTER — HOSPITAL ENCOUNTER (OUTPATIENT)
Facility: HOSPITAL | Age: 41
Setting detail: OBSERVATION
Discharge: HOME | End: 2021-08-29
Attending: EMERGENCY MEDICINE | Admitting: EMERGENCY MEDICINE

## 2021-08-29 VITALS
HEIGHT: 67 IN | WEIGHT: 245.6 LBS | RESPIRATION RATE: 16 BRPM | HEART RATE: 88 BPM | BODY MASS INDEX: 38.55 KG/M2 | SYSTOLIC BLOOD PRESSURE: 111 MMHG | TEMPERATURE: 97.8 F | DIASTOLIC BLOOD PRESSURE: 63 MMHG | OXYGEN SATURATION: 96 %

## 2021-08-29 DIAGNOSIS — R11.0 NAUSEA: ICD-10-CM

## 2021-08-29 DIAGNOSIS — R07.89 OTHER CHEST PAIN: ICD-10-CM

## 2021-08-29 DIAGNOSIS — R10.13 EPIGASTRIC PAIN: ICD-10-CM

## 2021-08-29 DIAGNOSIS — R07.9 CHEST PAIN, UNSPECIFIED TYPE: Primary | ICD-10-CM

## 2021-08-29 DIAGNOSIS — K21.9 GASTRO-ESOPHAGEAL REFLUX DISEASE WITHOUT ESOPHAGITIS: ICD-10-CM

## 2021-08-29 LAB
ALBUMIN SERPL-MCNC: 4.1 G/DL (ref 3.5–5.2)
ALBUMIN/GLOB SERPL: 1.1 G/DL
ALP SERPL-CCNC: 90 U/L (ref 39–117)
ALT SERPL W P-5'-P-CCNC: 25 U/L (ref 1–33)
ANION GAP SERPL CALCULATED.3IONS-SCNC: 12 MMOL/L (ref 5–15)
APTT PPP: 25 SECONDS (ref 24–31)
AST SERPL-CCNC: 14 U/L (ref 1–32)
BASOPHILS # BLD AUTO: 0.1 10*3/MM3 (ref 0–0.2)
BASOPHILS NFR BLD AUTO: 0.9 % (ref 0–1.5)
BH CV NUCLEAR PRIOR STUDY: 3
BH CV REST NUCLEAR ISOTOPE DOSE: 6 MCI
BH CV STRESS BP STAGE 1: NORMAL
BH CV STRESS BP STAGE 2: NORMAL
BH CV STRESS BP STAGE 3: NORMAL
BH CV STRESS DURATION MIN STAGE 1: 3
BH CV STRESS DURATION MIN STAGE 2: 3
BH CV STRESS DURATION MIN STAGE 3: 3
BH CV STRESS DURATION SEC STAGE 1: 0
BH CV STRESS DURATION SEC STAGE 2: 0
BH CV STRESS DURATION SEC STAGE 3: 0
BH CV STRESS GRADE STAGE 1: 10
BH CV STRESS GRADE STAGE 2: 12
BH CV STRESS GRADE STAGE 3: 14
BH CV STRESS HR STAGE 1: 119
BH CV STRESS HR STAGE 2: 147
BH CV STRESS HR STAGE 3: 159
BH CV STRESS METS STAGE 1: 5
BH CV STRESS METS STAGE 2: 7.5
BH CV STRESS METS STAGE 3: 10
BH CV STRESS NUCLEAR ISOTOPE DOSE: 19.8 MCI
BH CV STRESS PROTOCOL 1: NORMAL
BH CV STRESS RECOVERY BP: NORMAL MMHG
BH CV STRESS RECOVERY HR: 93 BPM
BH CV STRESS SPEED STAGE 1: 1.7
BH CV STRESS SPEED STAGE 2: 2.5
BH CV STRESS SPEED STAGE 3: 3.4
BH CV STRESS STAGE 1: 1
BH CV STRESS STAGE 2: 2
BH CV STRESS STAGE 3: 3
BILIRUB SERPL-MCNC: <0.2 MG/DL (ref 0–1.2)
BILIRUB UR QL STRIP: NEGATIVE
BUN SERPL-MCNC: 15 MG/DL (ref 6–20)
BUN/CREAT SERPL: 16.3 (ref 7–25)
CALCIUM SPEC-SCNC: 9 MG/DL (ref 8.6–10.5)
CHLORIDE SERPL-SCNC: 99 MMOL/L (ref 98–107)
CLARITY UR: ABNORMAL
CO2 SERPL-SCNC: 24 MMOL/L (ref 22–29)
COLOR UR: YELLOW
CREAT SERPL-MCNC: 0.92 MG/DL (ref 0.57–1)
DEPRECATED RDW RBC AUTO: 43.3 FL (ref 37–54)
EOSINOPHIL # BLD AUTO: 0.2 10*3/MM3 (ref 0–0.4)
EOSINOPHIL NFR BLD AUTO: 2.4 % (ref 0.3–6.2)
ERYTHROCYTE [DISTWIDTH] IN BLOOD BY AUTOMATED COUNT: 14.1 % (ref 12.3–15.4)
GFR SERPL CREATININE-BSD FRML MDRD: 68 ML/MIN/1.73
GLOBULIN UR ELPH-MCNC: 3.7 GM/DL
GLUCOSE SERPL-MCNC: 117 MG/DL (ref 65–99)
GLUCOSE UR STRIP-MCNC: NEGATIVE MG/DL
HCT VFR BLD AUTO: 39.3 % (ref 34–46.6)
HGB BLD-MCNC: 13.4 G/DL (ref 12–15.9)
HGB UR QL STRIP.AUTO: NEGATIVE
INR PPP: <0.93 (ref 0.93–1.1)
KETONES UR QL STRIP: NEGATIVE
LEUKOCYTE ESTERASE UR QL STRIP.AUTO: NEGATIVE
LIPASE SERPL-CCNC: 27 U/L (ref 13–60)
LYMPHOCYTES # BLD AUTO: 2.1 10*3/MM3 (ref 0.7–3.1)
LYMPHOCYTES NFR BLD AUTO: 23 % (ref 19.6–45.3)
MAXIMAL PREDICTED HEART RATE: 180 BPM
MCH RBC QN AUTO: 30.1 PG (ref 26.6–33)
MCHC RBC AUTO-ENTMCNC: 34 G/DL (ref 31.5–35.7)
MCV RBC AUTO: 88.5 FL (ref 79–97)
MONOCYTES # BLD AUTO: 0.5 10*3/MM3 (ref 0.1–0.9)
MONOCYTES NFR BLD AUTO: 5 % (ref 5–12)
NEUTROPHILS NFR BLD AUTO: 6.3 10*3/MM3 (ref 1.7–7)
NEUTROPHILS NFR BLD AUTO: 68.7 % (ref 42.7–76)
NITRITE UR QL STRIP: NEGATIVE
NRBC BLD AUTO-RTO: 0.1 /100 WBC (ref 0–0.2)
PERCENT MAX PREDICTED HR: 88.33 %
PH UR STRIP.AUTO: 6 [PH] (ref 5–8)
PLATELET # BLD AUTO: 363 10*3/MM3 (ref 140–450)
PMV BLD AUTO: 8 FL (ref 6–12)
POTASSIUM SERPL-SCNC: 3.9 MMOL/L (ref 3.5–5.2)
PROT SERPL-MCNC: 7.8 G/DL (ref 6–8.5)
PROT UR QL STRIP: NEGATIVE
PROTHROMBIN TIME: 10 SECONDS (ref 9.6–11.7)
RBC # BLD AUTO: 4.44 10*6/MM3 (ref 3.77–5.28)
SARS-COV-2 RNA PNL SPEC NAA+PROBE: NOT DETECTED
SODIUM SERPL-SCNC: 135 MMOL/L (ref 136–145)
SP GR UR STRIP: 1.02 (ref 1–1.03)
STRESS BASELINE BP: NORMAL MMHG
STRESS BASELINE HR: 75 BPM
STRESS PERCENT HR: 104 %
STRESS POST PEAK BP: NORMAL MMHG
STRESS POST PEAK HR: 159 BPM
STRESS TARGET HR: 153 BPM
TROPONIN T SERPL-MCNC: <0.01 NG/ML (ref 0–0.03)
TROPONIN T SERPL-MCNC: <0.01 NG/ML (ref 0–0.03)
UROBILINOGEN UR QL STRIP: ABNORMAL
WBC # BLD AUTO: 9.1 10*3/MM3 (ref 3.4–10.8)

## 2021-08-29 PROCEDURE — 25010000002 ENOXAPARIN PER 10 MG: Performed by: PHYSICIAN ASSISTANT

## 2021-08-29 PROCEDURE — 96375 TX/PRO/DX INJ NEW DRUG ADDON: CPT

## 2021-08-29 PROCEDURE — 76705 ECHO EXAM OF ABDOMEN: CPT

## 2021-08-29 PROCEDURE — 85730 THROMBOPLASTIN TIME PARTIAL: CPT | Performed by: EMERGENCY MEDICINE

## 2021-08-29 PROCEDURE — 93016 CV STRESS TEST SUPVJ ONLY: CPT | Performed by: NURSE PRACTITIONER

## 2021-08-29 PROCEDURE — 93005 ELECTROCARDIOGRAM TRACING: CPT | Performed by: EMERGENCY MEDICINE

## 2021-08-29 PROCEDURE — 81003 URINALYSIS AUTO W/O SCOPE: CPT | Performed by: EMERGENCY MEDICINE

## 2021-08-29 PROCEDURE — G0378 HOSPITAL OBSERVATION PER HR: HCPCS

## 2021-08-29 PROCEDURE — 93010 ELECTROCARDIOGRAM REPORT: CPT | Performed by: INTERNAL MEDICINE

## 2021-08-29 PROCEDURE — C9803 HOPD COVID-19 SPEC COLLECT: HCPCS

## 2021-08-29 PROCEDURE — 84484 ASSAY OF TROPONIN QUANT: CPT | Performed by: EMERGENCY MEDICINE

## 2021-08-29 PROCEDURE — 96374 THER/PROPH/DIAG INJ IV PUSH: CPT

## 2021-08-29 PROCEDURE — 96376 TX/PRO/DX INJ SAME DRUG ADON: CPT

## 2021-08-29 PROCEDURE — 93005 ELECTROCARDIOGRAM TRACING: CPT

## 2021-08-29 PROCEDURE — 85025 COMPLETE CBC W/AUTO DIFF WBC: CPT | Performed by: EMERGENCY MEDICINE

## 2021-08-29 PROCEDURE — 84484 ASSAY OF TROPONIN QUANT: CPT | Performed by: PHYSICIAN ASSISTANT

## 2021-08-29 PROCEDURE — 99203 OFFICE O/P NEW LOW 30 MIN: CPT | Performed by: SURGERY

## 2021-08-29 PROCEDURE — 93005 ELECTROCARDIOGRAM TRACING: CPT | Performed by: PHYSICIAN ASSISTANT

## 2021-08-29 PROCEDURE — 78452 HT MUSCLE IMAGE SPECT MULT: CPT

## 2021-08-29 PROCEDURE — 80053 COMPREHEN METABOLIC PANEL: CPT | Performed by: EMERGENCY MEDICINE

## 2021-08-29 PROCEDURE — 99204 OFFICE O/P NEW MOD 45 MIN: CPT | Performed by: NURSE PRACTITIONER

## 2021-08-29 PROCEDURE — 25010000002 MORPHINE PER 10 MG: Performed by: PHYSICIAN ASSISTANT

## 2021-08-29 PROCEDURE — 85610 PROTHROMBIN TIME: CPT | Performed by: EMERGENCY MEDICINE

## 2021-08-29 PROCEDURE — 78452 HT MUSCLE IMAGE SPECT MULT: CPT | Performed by: INTERNAL MEDICINE

## 2021-08-29 PROCEDURE — A9502 TC99M TETROFOSMIN: HCPCS | Performed by: EMERGENCY MEDICINE

## 2021-08-29 PROCEDURE — 83690 ASSAY OF LIPASE: CPT | Performed by: EMERGENCY MEDICINE

## 2021-08-29 PROCEDURE — 96372 THER/PROPH/DIAG INJ SC/IM: CPT

## 2021-08-29 PROCEDURE — 87635 SARS-COV-2 COVID-19 AMP PRB: CPT | Performed by: EMERGENCY MEDICINE

## 2021-08-29 PROCEDURE — 93018 CV STRESS TEST I&R ONLY: CPT | Performed by: INTERNAL MEDICINE

## 2021-08-29 PROCEDURE — 0 TECHNETIUM TETROFOSMIN KIT: Performed by: EMERGENCY MEDICINE

## 2021-08-29 PROCEDURE — 71045 X-RAY EXAM CHEST 1 VIEW: CPT

## 2021-08-29 PROCEDURE — 99284 EMERGENCY DEPT VISIT MOD MDM: CPT

## 2021-08-29 PROCEDURE — 93017 CV STRESS TEST TRACING ONLY: CPT

## 2021-08-29 RX ORDER — ONDANSETRON 4 MG/1
4 TABLET, FILM COATED ORAL EVERY 6 HOURS PRN
Status: DISCONTINUED | OUTPATIENT
Start: 2021-08-29 | End: 2021-08-29 | Stop reason: HOSPADM

## 2021-08-29 RX ORDER — ACETAMINOPHEN 325 MG/1
650 TABLET ORAL EVERY 4 HOURS PRN
Status: DISCONTINUED | OUTPATIENT
Start: 2021-08-29 | End: 2021-08-29 | Stop reason: HOSPADM

## 2021-08-29 RX ORDER — SUCRALFATE 1 G/1
1 TABLET ORAL
Qty: 120 TABLET | Refills: 0 | Status: SHIPPED | OUTPATIENT
Start: 2021-08-29 | End: 2021-09-22

## 2021-08-29 RX ORDER — SODIUM CHLORIDE 0.9 % (FLUSH) 0.9 %
10 SYRINGE (ML) INJECTION EVERY 12 HOURS SCHEDULED
Status: DISCONTINUED | OUTPATIENT
Start: 2021-08-29 | End: 2021-08-29 | Stop reason: HOSPADM

## 2021-08-29 RX ORDER — ACETAMINOPHEN 650 MG/1
650 SUPPOSITORY RECTAL EVERY 4 HOURS PRN
Status: DISCONTINUED | OUTPATIENT
Start: 2021-08-29 | End: 2021-08-29 | Stop reason: HOSPADM

## 2021-08-29 RX ORDER — NITROGLYCERIN 0.4 MG/1
0.4 TABLET SUBLINGUAL
Status: DISCONTINUED | OUTPATIENT
Start: 2021-08-29 | End: 2021-08-29 | Stop reason: HOSPADM

## 2021-08-29 RX ORDER — MORPHINE SULFATE 4 MG/ML
4 INJECTION, SOLUTION INTRAMUSCULAR; INTRAVENOUS EVERY 4 HOURS PRN
Status: DISCONTINUED | OUTPATIENT
Start: 2021-08-29 | End: 2021-08-29 | Stop reason: HOSPADM

## 2021-08-29 RX ORDER — ASPIRIN 81 MG/1
324 TABLET, CHEWABLE ORAL ONCE
Status: COMPLETED | OUTPATIENT
Start: 2021-08-29 | End: 2021-08-29

## 2021-08-29 RX ORDER — PANTOPRAZOLE SODIUM 40 MG/1
40 TABLET, DELAYED RELEASE ORAL
Status: DISCONTINUED | OUTPATIENT
Start: 2021-08-29 | End: 2021-08-29 | Stop reason: HOSPADM

## 2021-08-29 RX ORDER — ACETAMINOPHEN 160 MG/5ML
650 SOLUTION ORAL EVERY 4 HOURS PRN
Status: DISCONTINUED | OUTPATIENT
Start: 2021-08-29 | End: 2021-08-29 | Stop reason: HOSPADM

## 2021-08-29 RX ORDER — LISINOPRIL 5 MG/1
10 TABLET ORAL DAILY
Status: DISCONTINUED | OUTPATIENT
Start: 2021-08-29 | End: 2021-08-29 | Stop reason: HOSPADM

## 2021-08-29 RX ORDER — CHOLECALCIFEROL (VITAMIN D3) 125 MCG
5 CAPSULE ORAL NIGHTLY PRN
Status: DISCONTINUED | OUTPATIENT
Start: 2021-08-29 | End: 2021-08-29 | Stop reason: HOSPADM

## 2021-08-29 RX ORDER — MONTELUKAST SODIUM 10 MG/1
10 TABLET ORAL NIGHTLY
Status: DISCONTINUED | OUTPATIENT
Start: 2021-08-29 | End: 2021-08-29 | Stop reason: HOSPADM

## 2021-08-29 RX ORDER — BUPROPION HYDROCHLORIDE 150 MG/1
300 TABLET ORAL DAILY
Status: DISCONTINUED | OUTPATIENT
Start: 2021-08-29 | End: 2021-08-29 | Stop reason: HOSPADM

## 2021-08-29 RX ORDER — SUCRALFATE 1 G/1
1 TABLET ORAL
Status: DISCONTINUED | OUTPATIENT
Start: 2021-08-29 | End: 2021-08-29 | Stop reason: HOSPADM

## 2021-08-29 RX ORDER — ONDANSETRON 2 MG/ML
4 INJECTION INTRAMUSCULAR; INTRAVENOUS EVERY 6 HOURS PRN
Status: DISCONTINUED | OUTPATIENT
Start: 2021-08-29 | End: 2021-08-29 | Stop reason: HOSPADM

## 2021-08-29 RX ORDER — SODIUM CHLORIDE 0.9 % (FLUSH) 0.9 %
10 SYRINGE (ML) INJECTION AS NEEDED
Status: DISCONTINUED | OUTPATIENT
Start: 2021-08-29 | End: 2021-08-29 | Stop reason: HOSPADM

## 2021-08-29 RX ADMIN — LISINOPRIL 10 MG: 5 TABLET ORAL at 14:03

## 2021-08-29 RX ADMIN — MORPHINE SULFATE 4 MG: 4 INJECTION INTRAVENOUS at 11:01

## 2021-08-29 RX ADMIN — SUCRALFATE 1 G: 1 TABLET ORAL at 16:33

## 2021-08-29 RX ADMIN — ASPIRIN 81 MG CHEWABLE TABLET 324 MG: 81 TABLET CHEWABLE at 07:23

## 2021-08-29 RX ADMIN — PANTOPRAZOLE SODIUM 40 MG: 40 TABLET, DELAYED RELEASE ORAL at 16:33

## 2021-08-29 RX ADMIN — TETROFOSMIN 1 DOSE: 1.38 INJECTION, POWDER, LYOPHILIZED, FOR SOLUTION INTRAVENOUS at 12:20

## 2021-08-29 RX ADMIN — TETROFOSMIN 1 DOSE: 1.38 INJECTION, POWDER, LYOPHILIZED, FOR SOLUTION INTRAVENOUS at 14:30

## 2021-08-29 RX ADMIN — Medication 10 ML: at 14:05

## 2021-08-29 RX ADMIN — MORPHINE SULFATE 4 MG: 4 INJECTION INTRAVENOUS at 16:33

## 2021-08-29 RX ADMIN — ENOXAPARIN SODIUM 40 MG: 40 INJECTION SUBCUTANEOUS at 16:33

## 2021-08-29 RX ADMIN — LIDOCAINE HYDROCHLORIDE: 20 SOLUTION ORAL; TOPICAL at 10:30

## 2021-08-30 ENCOUNTER — TRANSITIONAL CARE MANAGEMENT TELEPHONE ENCOUNTER (OUTPATIENT)
Dept: CALL CENTER | Facility: HOSPITAL | Age: 41
End: 2021-08-30

## 2021-08-30 LAB — QT INTERVAL: 357 MS

## 2021-08-30 NOTE — OUTREACH NOTE
Call Center TCM Note      Responses   [facility] patient discharged from?  [facility]   Does the patient have one of the following disease processes/diagnoses(primary or secondary)?  Other   TCM attempt successful?  No   Unsuccessful attempts  Attempt 1          Ethel Noyola LPN    8/30/2021, 14:33 EDT

## 2021-08-30 NOTE — OUTREACH NOTE
Call Center TCM Note      Responses   [facility] patient discharged from?  [facility]   Does the patient have one of the following disease processes/diagnoses(primary or secondary)?  Other   TCM attempt successful?  No   Unsuccessful attempts  Attempt 2          Ethel Noyola LPN    8/30/2021, 16:29 EDT

## 2021-08-30 NOTE — OUTREACH NOTE
Prep Survey      Responses   [facility] patient discharged from?  [facility]   Is LACE score < 7 ?  Yes   Emergency Room discharge w/ pulse ox?  No   Eligibility  TCM   Hospital  [facility]   Date of Admission  08/29/21   Date of Discharge  08/29/21   Discharge Disposition  Home or Self Care   Discharge diagnosis  chest/epigastric pain, stress test no ischemia,    Does the patient have one of the following disease processes/diagnoses(primary or secondary)?  Other   Does the patient have Home health ordered?  No   Is there a DME ordered?  No   Prep survey completed?  Yes          Roxy Moralez RN

## 2021-08-31 ENCOUNTER — TRANSITIONAL CARE MANAGEMENT TELEPHONE ENCOUNTER (OUTPATIENT)
Dept: CALL CENTER | Facility: HOSPITAL | Age: 41
End: 2021-08-31

## 2021-08-31 LAB — QT INTERVAL: 411 MS

## 2021-08-31 NOTE — OUTREACH NOTE
Call Center TCM Note      Responses   [facility] patient discharged from?  [facility]   Does the patient have one of the following disease processes/diagnoses(primary or secondary)?  Other   TCM attempt successful?  Yes   Discharge diagnosis  chest/epigastric pain, stress test no ischemia,    Meds reviewed with patient/caregiver?  Yes   Is the patient having any side effects they believe may be caused by any medication additions or changes?  No   Does the patient have all medications ordered at discharge?  Yes   Is the patient taking all medications as directed (includes completed medication regime)?  Yes   Does the patient have a primary care provider?   Yes   Does the patient have an appointment with their PCP within 7 days of discharge?  No   Comments regarding PCP  Pt declines TCM FWP but will keep sched fwp 09/22/2021. Pt is going to reach out to PCP about a GI referral.    Has the patient kept scheduled appointments due by today?  N/A   Has home health visited the patient within 72 hours of discharge?  N/A   Psychosocial issues?  No   Did the patient receive a copy of their discharge instructions?  Yes   Nursing interventions  Reviewed instructions with patient   What is the patient's perception of their health status since discharge?  Same   Is the patient/caregiver able to teach back signs and symptoms related to disease process for when to call PCP?  Yes   Is the patient/caregiver able to teach back signs and symptoms related to disease process for when to call 911?  Yes   Is the patient/caregiver able to teach back the hierarchy of who to call/visit for symptoms/problems? PCP, Specialist, Home health nurse, Urgent Care, ED, 911  Yes   If the patient is a current smoker, are they able to teach back resources for cessation?  Not a smoker   TCM call completed?  Yes   Wrap up additional comments  Pt states she feels about the same. No better no worse. Meds in place. No questions. pt seemed annoyed at my call.  Pt declines TCM F[facility] but will keep sched fwp 09/22/2021. Pt is going to reach out to PCP about a GI referral.           Chelsey Beckwith MA    8/31/2021, 16:17 EDT

## 2021-09-22 ENCOUNTER — LAB (OUTPATIENT)
Dept: FAMILY MEDICINE CLINIC | Facility: CLINIC | Age: 41
End: 2021-09-22

## 2021-09-22 ENCOUNTER — OFFICE VISIT (OUTPATIENT)
Dept: FAMILY MEDICINE CLINIC | Facility: CLINIC | Age: 41
End: 2021-09-22

## 2021-09-22 VITALS
SYSTOLIC BLOOD PRESSURE: 136 MMHG | DIASTOLIC BLOOD PRESSURE: 80 MMHG | WEIGHT: 248 LBS | BODY MASS INDEX: 38.84 KG/M2 | TEMPERATURE: 96.4 F | HEART RATE: 78 BPM | OXYGEN SATURATION: 99 %

## 2021-09-22 DIAGNOSIS — E78.2 MIXED HYPERLIPIDEMIA: ICD-10-CM

## 2021-09-22 DIAGNOSIS — F33.1 MAJOR DEPRESSIVE DISORDER, RECURRENT, MODERATE (HCC): Primary | ICD-10-CM

## 2021-09-22 DIAGNOSIS — Z00.00 ANNUAL PHYSICAL EXAM: ICD-10-CM

## 2021-09-22 DIAGNOSIS — L92.3 TATTOO REACTION: ICD-10-CM

## 2021-09-22 DIAGNOSIS — E66.01 CLASS 2 SEVERE OBESITY DUE TO EXCESS CALORIES WITH SERIOUS COMORBIDITY AND BODY MASS INDEX (BMI) OF 38.0 TO 38.9 IN ADULT (HCC): ICD-10-CM

## 2021-09-22 DIAGNOSIS — M25.50 DIFFUSE ARTHRALGIA: ICD-10-CM

## 2021-09-22 DIAGNOSIS — I10 ESSENTIAL HYPERTENSION: ICD-10-CM

## 2021-09-22 LAB
25(OH)D3 SERPL-MCNC: 31.9 NG/ML
ALBUMIN SERPL-MCNC: 4.2 G/DL (ref 3.5–5.2)
ALBUMIN/GLOB SERPL: 1.2 G/DL
ALP SERPL-CCNC: 83 U/L (ref 39–117)
ALT SERPL W P-5'-P-CCNC: 22 U/L (ref 1–33)
ANION GAP SERPL CALCULATED.3IONS-SCNC: 7.8 MMOL/L (ref 5–15)
AST SERPL-CCNC: 11 U/L (ref 1–32)
BASOPHILS # BLD AUTO: 0.06 10*3/MM3 (ref 0–0.2)
BASOPHILS NFR BLD AUTO: 0.6 % (ref 0–1.5)
BILIRUB SERPL-MCNC: 0.4 MG/DL (ref 0–1.2)
BUN SERPL-MCNC: 9 MG/DL (ref 6–20)
BUN/CREAT SERPL: 12.7 (ref 7–25)
CALCIUM SPEC-SCNC: 9.7 MG/DL (ref 8.6–10.5)
CHLORIDE SERPL-SCNC: 103 MMOL/L (ref 98–107)
CHOLEST SERPL-MCNC: 285 MG/DL (ref 0–200)
CHROMATIN AB SERPL-ACNC: <10 IU/ML (ref 0–14)
CO2 SERPL-SCNC: 25.2 MMOL/L (ref 22–29)
CREAT SERPL-MCNC: 0.71 MG/DL (ref 0.57–1)
DEPRECATED RDW RBC AUTO: 40.9 FL (ref 37–54)
EOSINOPHIL # BLD AUTO: 0.25 10*3/MM3 (ref 0–0.4)
EOSINOPHIL NFR BLD AUTO: 2.6 % (ref 0.3–6.2)
ERYTHROCYTE [DISTWIDTH] IN BLOOD BY AUTOMATED COUNT: 12.7 % (ref 12.3–15.4)
ERYTHROCYTE [SEDIMENTATION RATE] IN BLOOD: 43 MM/HR (ref 0–20)
GFR SERPL CREATININE-BSD FRML MDRD: 91 ML/MIN/1.73
GLOBULIN UR ELPH-MCNC: 3.4 GM/DL
GLUCOSE SERPL-MCNC: 84 MG/DL (ref 65–99)
HCT VFR BLD AUTO: 40.6 % (ref 34–46.6)
HDLC SERPL-MCNC: 40 MG/DL (ref 40–60)
HGB BLD-MCNC: 13.2 G/DL (ref 12–15.9)
IMM GRANULOCYTES # BLD AUTO: 0.03 10*3/MM3 (ref 0–0.05)
IMM GRANULOCYTES NFR BLD AUTO: 0.3 % (ref 0–0.5)
LDLC SERPL CALC-MCNC: 209 MG/DL (ref 0–100)
LDLC/HDLC SERPL: 5.2 {RATIO}
LYMPHOCYTES # BLD AUTO: 1.88 10*3/MM3 (ref 0.7–3.1)
LYMPHOCYTES NFR BLD AUTO: 19.4 % (ref 19.6–45.3)
MCH RBC QN AUTO: 28.9 PG (ref 26.6–33)
MCHC RBC AUTO-ENTMCNC: 32.5 G/DL (ref 31.5–35.7)
MCV RBC AUTO: 88.8 FL (ref 79–97)
MONOCYTES # BLD AUTO: 0.64 10*3/MM3 (ref 0.1–0.9)
MONOCYTES NFR BLD AUTO: 6.6 % (ref 5–12)
NEUTROPHILS NFR BLD AUTO: 6.84 10*3/MM3 (ref 1.7–7)
NEUTROPHILS NFR BLD AUTO: 70.5 % (ref 42.7–76)
NRBC BLD AUTO-RTO: 0 /100 WBC (ref 0–0.2)
PLATELET # BLD AUTO: 383 10*3/MM3 (ref 140–450)
PMV BLD AUTO: 9.5 FL (ref 6–12)
POTASSIUM SERPL-SCNC: 4.3 MMOL/L (ref 3.5–5.2)
PROT SERPL-MCNC: 7.6 G/DL (ref 6–8.5)
RBC # BLD AUTO: 4.57 10*6/MM3 (ref 3.77–5.28)
SODIUM SERPL-SCNC: 136 MMOL/L (ref 136–145)
TRIGL SERPL-MCNC: 186 MG/DL (ref 0–150)
TSH SERPL DL<=0.05 MIU/L-ACNC: 1.56 UIU/ML (ref 0.27–4.2)
URATE SERPL-MCNC: 6.6 MG/DL (ref 2.4–5.7)
VIT B12 BLD-MCNC: 493 PG/ML (ref 211–946)
VLDLC SERPL-MCNC: 36 MG/DL (ref 5–40)
WBC # BLD AUTO: 9.7 10*3/MM3 (ref 3.4–10.8)

## 2021-09-22 PROCEDURE — 82306 VITAMIN D 25 HYDROXY: CPT | Performed by: FAMILY MEDICINE

## 2021-09-22 PROCEDURE — 86235 NUCLEAR ANTIGEN ANTIBODY: CPT | Performed by: FAMILY MEDICINE

## 2021-09-22 PROCEDURE — 86431 RHEUMATOID FACTOR QUANT: CPT | Performed by: FAMILY MEDICINE

## 2021-09-22 PROCEDURE — 86200 CCP ANTIBODY: CPT | Performed by: FAMILY MEDICINE

## 2021-09-22 PROCEDURE — 85652 RBC SED RATE AUTOMATED: CPT | Performed by: FAMILY MEDICINE

## 2021-09-22 PROCEDURE — 80050 GENERAL HEALTH PANEL: CPT | Performed by: FAMILY MEDICINE

## 2021-09-22 PROCEDURE — 86225 DNA ANTIBODY NATIVE: CPT | Performed by: FAMILY MEDICINE

## 2021-09-22 PROCEDURE — 99396 PREV VISIT EST AGE 40-64: CPT | Performed by: FAMILY MEDICINE

## 2021-09-22 PROCEDURE — 99214 OFFICE O/P EST MOD 30 MIN: CPT | Performed by: FAMILY MEDICINE

## 2021-09-22 PROCEDURE — 84550 ASSAY OF BLOOD/URIC ACID: CPT | Performed by: FAMILY MEDICINE

## 2021-09-22 PROCEDURE — 80061 LIPID PANEL: CPT | Performed by: FAMILY MEDICINE

## 2021-09-22 PROCEDURE — 82607 VITAMIN B-12: CPT | Performed by: FAMILY MEDICINE

## 2021-09-22 PROCEDURE — 36415 COLL VENOUS BLD VENIPUNCTURE: CPT | Performed by: FAMILY MEDICINE

## 2021-09-22 RX ORDER — CETIRIZINE HYDROCHLORIDE 10 MG/1
10 TABLET ORAL NIGHTLY
Qty: 90 TABLET | Refills: 1 | Status: SHIPPED | OUTPATIENT
Start: 2021-09-22 | End: 2022-03-04 | Stop reason: ALTCHOICE

## 2021-09-22 RX ORDER — HYDROXYZINE HYDROCHLORIDE 25 MG/1
25 TABLET, FILM COATED ORAL 3 TIMES DAILY PRN
Qty: 90 TABLET | Refills: 1 | Status: SHIPPED | OUTPATIENT
Start: 2021-09-22 | End: 2021-11-08

## 2021-09-22 RX ORDER — BUPROPION HYDROCHLORIDE 300 MG/1
300 TABLET ORAL DAILY
Qty: 90 TABLET | Refills: 1 | Status: SHIPPED | OUTPATIENT
Start: 2021-09-22 | End: 2021-12-13 | Stop reason: SDUPTHER

## 2021-09-22 RX ORDER — HYDROXYZINE HYDROCHLORIDE 25 MG/1
25 TABLET, FILM COATED ORAL 3 TIMES DAILY PRN
COMMUNITY
Start: 2021-08-09 | End: 2021-09-22 | Stop reason: SDUPTHER

## 2021-09-22 NOTE — PATIENT INSTRUCTIONS
Gallbladder Eating Plan  If you have a gallbladder condition, you may have trouble digesting fats. Eating a low-fat diet can help reduce your symptoms, and may be helpful before and after having surgery to remove your gallbladder (cholecystectomy). Your health care provider may recommend that you work with a diet and nutrition specialist (dietitian) to help you reduce the amount of fat in your diet.  What are tips for following this plan?  General guidelines  · Limit your fat intake to less than 30% of your total daily calories. If you eat around 1,800 calories each day, this is less than 60 grams (g) of fat per day.  · Fat is an important part of a healthy diet. Eating a low-fat diet can make it hard to maintain a healthy body weight. Ask your dietitian how much fat, calories, and other nutrients you need each day.  · Eat small, frequent meals throughout the day instead of three large meals.  · Drink at least 8-10 cups of fluid a day. Drink enough fluid to keep your urine clear or pale yellow.  · Limit alcohol intake to no more than 1 drink a day for nonpregnant women and 2 drinks a day for men. One drink equals 12 oz of beer, 5 oz of wine, or 1½ oz of hard liquor.  Reading food labels  · Check Nutrition Facts on food labels for the amount of fat per serving. Choose foods with less than 3 grams of fat per serving.  Shopping  · Choose nonfat and low-fat healthy foods. Look for the words “nonfat,” “low fat,” or “fat free.”  · Avoid buying processed or prepackaged foods.  Cooking  · Cook using low-fat methods, such as baking, broiling, grilling, or boiling.  · Cook with small amounts of healthy fats, such as olive oil, grapeseed oil, canola oil, or sunflower oil.  What foods are recommended?    · All fresh, frozen, or canned fruits and vegetables.  · Whole grains.  · Low-fat or non-fat (skim) milk and yogurt.  · Lean meat, skinless poultry, fish, eggs, and beans.  · Low-fat protein supplement powders or  drinks.  · Spices and herbs.  What foods are not recommended?  · High-fat foods. These include baked goods, fast food, fatty cuts of meat, ice cream, french toast, sweet rolls, pizza, cheese bread, foods covered with butter, creamy sauces, or cheese.  · Fried foods. These include french fries, tempura, battered fish, breaded chicken, fried breads, and sweets.  · Foods with strong odors.  · Foods that cause bloating and gas.  Summary  · A low-fat diet can be helpful if you have a gallbladder condition, or before and after gallbladder surgery.  · Limit your fat intake to less than 30% of your total daily calories. This is about 60 g of fat if you eat 1,800 calories each day.  · Eat small, frequent meals throughout the day instead of three large meals.  This information is not intended to replace advice given to you by your health care provider. Make sure you discuss any questions you have with your health care provider.  Document Revised: 04/09/2020 Document Reviewed: 01/25/2018  [employer] Patient Education © 2021 [employer]      Exercising to Lose Weight  Exercise is structured, repetitive physical activity to improve fitness and health. Getting regular exercise is important for everyone. It is especially important if you are overweight. Being overweight increases your risk of heart disease, stroke, diabetes, high blood pressure, and several types of cancer. Reducing your calorie intake and exercising can help you lose weight.  Exercise is usually categorized as moderate or vigorous intensity. To lose weight, most people need to do a certain amount of moderate-intensity or vigorous-intensity exercise each week.  Moderate-intensity exercise    Moderate-intensity exercise is any activity that gets you moving enough to burn at least three times more energy (calories) than if you were sitting.  Examples of moderate exercise include:  · Walking a mile in 15 minutes.  · Doing light yard work.  · Biking at an easy  pace.  Most people should get at least 150 minutes (2 hours and 30 minutes) a week of moderate-intensity exercise to maintain their body weight.  Vigorous-intensity exercise  Vigorous-intensity exercise is any activity that gets you moving enough to burn at least six times more calories than if you were sitting. When you exercise at this intensity, you should be working hard enough that you are not able to carry on a conversation.  Examples of vigorous exercise include:  · Running.  · Playing a team sport, such as football, basketball, and soccer.  · Jumping rope.  Most people should get at least 75 minutes (1 hour and 15 minutes) a week of vigorous-intensity exercise to maintain their body weight.  How can exercise affect me?  When you exercise enough to burn more calories than you eat, you lose weight. Exercise also reduces body fat and builds muscle. The more muscle you have, the more calories you burn. Exercise also:  · Improves mood.  · Reduces stress and tension.  · Improves your overall fitness, flexibility, and endurance.  · Increases bone strength.  The amount of exercise you need to lose weight depends on:  · Your age.  · The type of exercise.  · Any health conditions you have.  · Your overall physical ability.  Talk to your health care provider about how much exercise you need and what types of activities are safe for you.  What actions can I take to lose weight?  Nutrition    · Make changes to your diet as told by your health care provider or diet and nutrition specialist (dietitian). This may include:  ? Eating fewer calories.  ? Eating more protein.  ? Eating less unhealthy fats.  ? Eating a diet that includes fresh fruits and vegetables, whole grains, low-fat dairy products, and lean protein.  ? Avoiding foods with added fat, salt, and sugar.  · Drink plenty of water while you exercise to prevent dehydration or heat stroke.    Activity  · Choose an activity that you enjoy and set realistic goals. Your  health care provider can help you make an exercise plan that works for you.  · Exercise at a moderate or vigorous intensity most days of the week.  ? The intensity of exercise may vary from person to person. You can tell how intense a workout is for you by paying attention to your breathing and heartbeat. Most people will notice their breathing and heartbeat get faster with more intense exercise.  · Do resistance training twice each week, such as:  ? Push-ups.  ? Sit-ups.  ? Lifting weights.  ? Using resistance bands.  · Getting short amounts of exercise can be just as helpful as long structured periods of exercise. If you have trouble finding time to exercise, try to include exercise in your daily routine.  ? Get up, stretch, and walk around every 30 minutes throughout the day.  ? Go for a walk during your lunch break.  ? Park your car farther away from your destination.  ? If you take public transportation, get off one stop early and walk the rest of the way.  ? Make phone calls while standing up and walking around.  ? Take the stairs instead of elevators or escalators.  · Wear comfortable clothes and shoes with good support.  · Do not exercise so much that you hurt yourself, feel dizzy, or get very short of breath.  Where to find more information  · [place] Department of Health and Human Services: www.hhs.gov  · Centers for Disease Control and Prevention (CDC): www.cdc.gov  Contact a health care provider:  · Before starting a new exercise program.  · If you have questions or concerns about your weight.  · If you have a medical problem that keeps you from exercising.  Get help right away if you have any of the following while exercising:  · Injury.  · Dizziness.  · Difficulty breathing or shortness of breath that does not go away when you stop exercising.  · Chest pain.  · Rapid heartbeat.  Summary  · Being overweight increases your risk of heart disease, stroke, diabetes, high blood pressure, and several types of  cancer.  · Losing weight happens when you burn more calories than you eat.  · Reducing the amount of calories you eat in addition to getting regular moderate or vigorous exercise each week helps you lose weight.  This information is not intended to replace advice given to you by your health care provider. Make sure you discuss any questions you have with your health care provider.  Document Revised: 04/15/2021 Document Reviewed: 04/15/2021  [employer] Patient Education © 2021 [employer]       ADVANCE CARE PLANNING  Conversations that Matter  PLANNING GUIDE    LOOKING BACK ...  Who we are, what we believe, and what we value are all shaped by experiences we have had. Yazdanism, family traditions, jobs and friends affect us deeply.    Has anything happened in your past that shaped your feelings about medical treatment?    Think about an experience you may have had with a family member or friend who was faced with a decision about medical care near the end of life. What was positive about that experience? What do you wish would have been done differently?    HERE AND NOW ...  Do you have any significant health problems now? What kinds of things bring you such patricia that, should a health problem prevent you from doing them any more, life would have little meaning? What short- or long-term goals do you have? How might medical treatment help you or hinder you in accomplishing those goals?    WHAT ABOUT TOMORROW?  What significant health problems do you fear may affect you in the future? How do you feel about the possibility of having to go to a nursing home? How would decisions be made if you could not make them?    WHO SHOULD MAKE DECISIONS?  An important part of planning is to consider whether or not you could appoint someone to make your healthcare decisions if you could not make them yourself. Many people select a close family member, but you are free to pick anyone you think could best represent you. Whoever you appoint  "should have all of the following qualifications:    • Can be trusted.  • Is willing to accept this responsibility.  • Is willing to follow the values and instructions you have discussed.  • Is able to make complex, difficult decisions.    It is helpful, but not required, to appoint one or more alternate persons in case your first choice becomes unable or unwilling to represent you. It is best if only one person has authority at a time, but you can instruct your representatives to discuss decisions together if time permits.    WHAT FUTURE DECISIONS NEED TO BE CONSIDERED?  Providing instructions for future healthcare decisions may seem like an impossible task. How can anyone plan for all the possibilities? You cannot … but you do not have to.    You need to plan for situations where you:  1. Become unexpectedly incapable of making your own decisions  2. It is clear you will have little or no recovery, and  3. The injury or loss of function is significant.    Such a situation might arise because of an injury to the brain from an accident, a stroke, or a slowly progressive disease such as Alzheimer's.    To plan for this type of situation, many people state, \"If I'm going to be a vegetable, let me go,\" or \"No heroics,\" or \"Don't keep me alive on machines.\" While these remarks are a beginning, they simply are too vague to guide decision-making.    You need to completely describe under what circumstances your goals for medical care should be changed from attempting to prolong life to being allowed to die. In some situations, certain treatments may not make sense because they will not help, but other treatments will be of important benefit.    Consider these three questions:  1. When would it make sense to continue certain treatments in an effort to prolong life and seek recovery?  2. When would it make sense to stop or withhold certain treatments and accept death when it comes?  3. Under any circumstance, what kind of " comfort care would you want, including medication, spiritual and environmental options?    Making these choices requires understanding the information, weighing the benefits and burdens from your perspective, and then discussing your choices with those closest to you.    WHAT'S NEXT?  How do you make sure that your choices are respected? First talk, about them with your family, friends, clergy and physician, then put your choices in writing. Information about putting your plans into writing -- in an advance directive -- is available from your healthcare organization or .    Do you have any significant health problems? What health problems do you fear in the future?     Consider what frightens you most about medical treatment. What role does Bahai, karen or spirituality play in how you live your life? How does cost influence your decisions about medical care?   In terms of future medical care, under what circumstances would you want the goals of medical treatment to switch from attempting to prolong life to focusing on comfort?     Describe these circumstances in as much detail as possible.   Ask yourself, what will most help me live well at this point in my life?     Share your views with the person or people who would make your medical decisions if you could not make them.     THERE'S NO EASY WAY TO PLAN FOR FUTURE HEALTHCARE CHOICES.  It's a process that involves thinking and talking about complex and sensitive issues.    The questions that follow will help in the advance care planning process. This is a guide for your own benefit; it's not a test, and there are no right or wrong answers. It does not need to be completed all at once. You may use it to share your feelings with healthcare providers, your family and your friends. The answers to these questions will help those you love make choices for you when you cannot make them yourself.    These are things I need to tell my loved ones:  What is your  idea of comfort care? Describe how you would want medications to be used to provide comfort. What type of spiritual care would you want?     I need to learn about: ____________________________  I need to ask my healthcare provider: ________________

## 2021-09-22 NOTE — PROGRESS NOTES
Assessment and Plan     Problem List Items Addressed This Visit        Cardiac and Vasculature    Essential hypertension    Overview     BP at goal, <140/90.  Encouraged low-Na+ diet & 150 min exercise/week.   Continue lisinopril 10 mg daily.  Monitoring renal function.         Mixed hyperlipidemia    Overview     Reviewed lipid panel & LDL goal.  Encouraged a diet rich in vegetables & 150 minutes of exercise weekly.  Previously declined statin therapy.  Plan to recheck lipid panel.         Relevant Orders    Lipid Panel (Completed)       Endocrine and Metabolic    Class 2 severe obesity due to excess calories with serious comorbidity and body mass index (BMI) of 38.0 to 38.9 in adult (HCC)    Overview     Discussed health risks associated with obesity.  Encouraged 150 minutes of exercise weekly.            Mental Health    Major depressive disorder, recurrent, moderate (CMS/HCC) - Primary    Overview     Drug-resistant depression versus bipolar depression based on mood disorder questionnaire.  Patient has failed Prozac, Zoloft, Paxil, Depakote, and Xanax in the past.  Patient agreeable to starting mood stabilizer.  Vraylar 1.5 mg samples provided in office.  Continue Wellbutrin 300 mg daily.  Continue PRN hydroxyxine.  Advised to discontinue Singulair due to risk of worsening mood disorder.  RTO if symptoms worsen.         Relevant Medications    buPROPion XL (Wellbutrin XL) 300 MG 24 hr tablet    hydrOXYzine (ATARAX) 25 MG tablet    Other Relevant Orders    CBC Auto Differential (Completed)    Comprehensive Metabolic Panel (Completed)    TSH (Completed)    Vitamin B12 (Completed)    Vitamin D 25 Hydroxy (Completed)    Ambulatory Referral to Psychiatry (Completed)       Musculoskeletal and Injuries    Diffuse arthralgia    Overview     OA VS rheumatoid disease VS gout VS fibromyalgia.  Advised < 3g acetaminophen/day hours PRN.  Encouraged regular exercise and stretching.  Will obtain labs.         Relevant Orders     Uric acid (Completed)    JEREMY Comprehensive Panel (Completed)    Sedimentation Rate (Completed)    Cyclic Citrul Peptide Antibody, IgG / IgA (Completed)    Rheumatoid Factor, Quant (Completed)      Other Visit Diagnoses     Tattoo reaction        Likely exacerbated by concurrent Neosporin and topical steroid use.  Advised against Neosporin use.  Will refer to dermatology per patient request.    Relevant Orders    Ambulatory Referral to Dermatology (Completed)    Annual physical exam        Relevant Orders    CBC Auto Differential (Completed)    Comprehensive Metabolic Panel (Completed)    Lipid Panel (Completed)      Encouraged 150 minutes of moderate intensity activity weekly.   Encouraged regular dental visits.   Encouraged regular seat belt use.   Encouraged safe sex practices.   Patient provided with educational material regarding preventive health care in AVS.    Return in about 6 weeks (around 11/3/2021) for Recheck mood.        Patient was given instructions and counseling regarding her condition or for health maintenance advice. Please see specific information pulled into the AVS if appropriate.        Hyun is a 41 y.o. being seen today for  Annual Exam   Subjective   History of the Present Illness     Patient presents for an annual physical exam.    Diet: reduced meat intake and take out meals; eating humus and beans  Exercise: hiking    Seatbelt Use: regular    Breast Cancer Screening: NA.   Cervical Cancer Screening: Up to date.   Colon Cancer Screening: NA.     Obese white female with CMHx of poorly controlled MDD presents for follow up. Patient is a night owl and rises late. Reports episodic bursts of energy where she is able to complete home projects.  However the remainder of the time, patient struggles to get out of bed.  Reports a historic offer for ketamine therapy.  Patient would be interested in trying ketamine as her current regimen of Wellbutrin is reportedly not efficacious.  Has failed  multiple drugs in the past.       MDQ - 8/13 - occurring simultaneously - causing minor problem    Complains of diffuse, migrating joint pain and fatigue. Associated symptoms include stabbing sensation in her muscles and cramps in left hand though she is right handed. Additionally, reports episodic rashes of the flank and dorsal surface of the upper arms.    Complains of a chronically irritated tattoo following an MRI several years ago. Has been using neosporin and an OTC steroid cream. Requesting referral to dermatology.    Was hospitalized in late August 2021 for chest and epigastric pain.  Cardiovascular evaluation was unremarkable.  Arrangements were made for outpatient GI evaluation.  Patient reported that she is not interested in endoscopy or surgery.     Social History  She  reports that she has quit smoking. She has never used smokeless tobacco. She reports current alcohol use. She reports previous drug use.  Objective   Vital Signs          Vitals:    09/22/21 1403   BP: 136/80   BP Location: Right arm   Patient Position: Sitting   Cuff Size: Adult   Pulse: 78   Temp: 96.4 °F (35.8 °C)   TempSrc: Tympanic   SpO2: 99%   Weight: 112 kg (248 lb)     BP Readings from Last 1 Encounters:   09/22/21 136/80     Wt Readings from Last 3 Encounters:   09/22/21 112 kg (248 lb)   08/29/21 111 kg (245 lb 9.6 oz)   05/07/21 116 kg (255 lb 3.2 oz)   Body mass index is 38.84 kg/m².     Physical Exam  Vitals reviewed.   Constitutional:       General: She is not in acute distress.     Appearance: She is well-developed. She is obese. She is not diaphoretic.   HENT:      Head: Normocephalic and atraumatic.      Right Ear: Tympanic membrane and ear canal normal.      Left Ear: Tympanic membrane and ear canal normal.      Mouth/Throat:      Mouth: Mucous membranes are moist.      Pharynx: Oropharynx is clear.   Eyes:      Extraocular Movements: Extraocular movements intact.      Pupils: Pupils are equal, round, and reactive to  light.   Cardiovascular:      Rate and Rhythm: Normal rate and regular rhythm.   Pulmonary:      Effort: Pulmonary effort is normal.      Breath sounds: Normal breath sounds.   Abdominal:      General: Bowel sounds are normal.   Skin:     General: Skin is warm and dry.      Findings: Erythema (Surrounding tattoo of the right lower extremity. ) present.   Neurological:      Mental Status: She is alert and oriented to person, place, and time.   Psychiatric:         Mood and Affect: Mood normal. Affect is tearful.         Behavior: Behavior normal.

## 2021-09-23 LAB
CENTROMERE B AB SER-ACNC: <0.2 AI (ref 0–0.9)
CHROMATIN AB SERPL-ACNC: <0.2 AI (ref 0–0.9)
DSDNA AB SER-ACNC: <1 IU/ML (ref 0–9)
ENA JO1 AB SER-ACNC: <0.2 AI (ref 0–0.9)
ENA RNP AB SER-ACNC: <0.2 AI (ref 0–0.9)
ENA SCL70 AB SER-ACNC: <0.2 AI (ref 0–0.9)
ENA SM AB SER-ACNC: <0.2 AI (ref 0–0.9)
ENA SS-A AB SER-ACNC: <0.2 AI (ref 0–0.9)
ENA SS-B AB SER-ACNC: <0.2 AI (ref 0–0.9)
Lab: NORMAL

## 2021-09-24 PROBLEM — M25.50 DIFFUSE ARTHRALGIA: Status: ACTIVE | Noted: 2021-09-24

## 2021-09-24 LAB — CCP IGA+IGG SERPL IA-ACNC: 8 UNITS (ref 0–19)

## 2021-09-28 ENCOUNTER — TELEPHONE (OUTPATIENT)
Dept: FAMILY MEDICINE CLINIC | Facility: CLINIC | Age: 41
End: 2021-09-28

## 2021-09-28 DIAGNOSIS — M10.9 GOUTY ARTHRITIS: ICD-10-CM

## 2021-09-28 DIAGNOSIS — M25.50 DIFFUSE ARTHRALGIA: Primary | ICD-10-CM

## 2021-09-28 RX ORDER — CELECOXIB 200 MG/1
200 CAPSULE ORAL 2 TIMES DAILY PRN
Qty: 60 CAPSULE | Refills: 5 | Status: SHIPPED | OUTPATIENT
Start: 2021-09-28 | End: 2022-04-05

## 2021-09-28 RX ORDER — ALLOPURINOL 100 MG/1
100 TABLET ORAL 2 TIMES DAILY
Qty: 180 TABLET | Refills: 1 | Status: SHIPPED | OUTPATIENT
Start: 2021-09-28 | End: 2022-04-05

## 2021-09-28 NOTE — TELEPHONE ENCOUNTER
Please call patient and let her know that I don't know if she saw my note regarding her labs.    Blood levels, electrolytes, kidney function, liver function, and thyroid function are within appropriate range.     Blood work showed no evidence of autoimmune disease.     However inflammatory marker, sedimentation rate, and uric acid level were elevated.  This would indicate that you may have gouty arthritis.  Inflammatory markers may also be elevated as a result of obesity.  Would you be interested in starting a medication to help reduce your uric acid levels and control flareups of gouty arthritis?     Your vitamin D level is at the lower end of normal.  I recommend over-the-counter calcium 1200 mg and vitamin D 1000 international units daily.     Your cholesterol is dangerously elevated, specifically LDL/bad cholesterol.  In addition to a diet rich in vegetables and 150 minutes of exercise weekly, I recommend starting cholesterol medication.  We can discuss cholesterol medication at her next visit.    I can start her on a medication called Celebrex for her aches and pains. I have sent a prescription to her pharmacy.

## 2021-09-28 NOTE — TELEPHONE ENCOUNTER
Results given to patient. Yes, she would like to start something to reduce uric acid levels and control flareups of gouty arthritis.

## 2021-09-28 NOTE — TELEPHONE ENCOUNTER
PATIENT CALLING WANTING TO LET  KNOW SHE WOULD LIKE TO WAIT TO START THE CHOLESTEROL MEDICATION SHE WOULD LIKE TO DISCUSS IT IN PERSON. SHE WOULD LIKE TO START SOME TYPE OF ARTHRITIS MEDICATION.    PLEASE ADVISE  [phone]

## 2021-10-18 ENCOUNTER — OFFICE VISIT (OUTPATIENT)
Dept: PSYCHIATRY | Facility: CLINIC | Age: 41
End: 2021-10-18

## 2021-10-18 DIAGNOSIS — F41.1 GENERALIZED ANXIETY DISORDER WITH PANIC ATTACKS: Chronic | ICD-10-CM

## 2021-10-18 DIAGNOSIS — E78.2 MIXED HYPERLIPIDEMIA: ICD-10-CM

## 2021-10-18 DIAGNOSIS — F41.0 GENERALIZED ANXIETY DISORDER WITH PANIC ATTACKS: Chronic | ICD-10-CM

## 2021-10-18 DIAGNOSIS — E66.01 CLASS 2 SEVERE OBESITY DUE TO EXCESS CALORIES WITH SERIOUS COMORBIDITY AND BODY MASS INDEX (BMI) OF 38.0 TO 38.9 IN ADULT (HCC): ICD-10-CM

## 2021-10-18 DIAGNOSIS — F33.2 SEVERE EPISODE OF RECURRENT MAJOR DEPRESSIVE DISORDER, WITHOUT PSYCHOTIC FEATURES (HCC): Primary | Chronic | ICD-10-CM

## 2021-10-18 DIAGNOSIS — G47.09 OTHER INSOMNIA: ICD-10-CM

## 2021-10-18 DIAGNOSIS — F31.81 BIPOLAR II DISORDER (HCC): Chronic | ICD-10-CM

## 2021-10-18 PROCEDURE — 90792 PSYCH DIAG EVAL W/MED SRVCS: CPT

## 2021-10-18 RX ORDER — HYDROXYZINE PAMOATE 25 MG/1
25 CAPSULE ORAL NIGHTLY
Qty: 30 CAPSULE | Refills: 1 | Status: SHIPPED | OUTPATIENT
Start: 2021-10-18 | End: 2021-11-08 | Stop reason: SDDI

## 2021-10-18 NOTE — PROGRESS NOTES
"Subjective   Hyun Perez is a 41 y.o. female who presents today for initial evaluation .    Chief Complaint:  Treatment Resistant Depression    History of Present Illness:  Pt presents with frustration finding a medication that will help because she has \"tried all of them.\" She reports seeing Dr. Schmidt a few years back and being offered to be in a Ketamines infusion study she declined. She says he now regrets it, and was wondering if there is an option now for her treatment resistant depression. She reports a family hx of successful SA in mother, grandmother and extended family. The pt is currently mourning the loss of her mother who passed earlier this year. She admits to a fm hx of bipolar d/o. She says she was diagnosed with ADD as a kid, but her parents did not go through with getting her tx. The pt says she has gained 70lbs in the past few months, and she doesn't feel like she has been eating excessively or poorly enough to cause her cholesterol to be as high as it has been and her weight to increase as it has. She has been taking 300mg Wellbutrin daily for over 2 months with minimal benefit, stating \"it takes the edge off.\" The pt denies being diagnosed with bipolar d/o in the past.  SI/HI: SI daily without ever making a plan or anticipating actions.  AVH: No  SA: No  Psychiatric Hospitalizations: No  Possibly Manic Symptoms on MDQ: 9 symptoms.    The following portions of the patient's history were reviewed and updated as appropriate: allergies, current medications, past family history, past medical history, past social history, past surgical history and problem list.    PAST PSYCHIATRIC HISTORY  Axis I  Affective/Bipoloar Disorder, Anxiety/Panic Disorder, Attention Deficit Disorder  Axis II  None    PAST OUTPATIENT TREATMENT  Diagnosis treated:  Affective Disorder, Anxiety/Panic Disorder, ADD  Treatment Type:  Medication Management  Prior Psychiatric Medications:  Bupropion - takes edge off a little " bit.  prozac  depakote  seroquel  zoloft  paxil  duloxetine  *Others the patient cannot recall the names of.  Support Groups:  None  Sequelae Of Mental Disorder:  job disruption, social isolation, family disruption, financial hardships, emotional distress    Interval History  No Change    Side Effects  None on Wellbutrin.    Past Medical History:  Past Medical History:   Diagnosis Date   • Anxiety        Social History:  Social History     Socioeconomic History   • Marital status: Single   Tobacco Use   • Smoking status: Former Smoker   • Smokeless tobacco: Never Used   Substance and Sexual Activity   • Alcohol use: Yes   • Drug use: Not Currently       Family History:  Family History   Problem Relation Age of Onset   • Hypertension Mother    • Coronary artery disease Mother         CABG x4 in late 30s   • Depression Mother         mother committed suicide   • Hypertension Maternal Grandmother    • Diabetes Maternal Grandmother    • Depression Maternal Grandmother         committed suicide   • Hypertension Maternal Grandfather    • Diabetes Maternal Grandfather    • Heart attack Maternal Grandfather    • Depression Maternal Cousin         committed suicide   • Depression Maternal Aunt         committed suicide       Past Surgical History:  Past Surgical History:   Procedure Laterality Date   • LEEP  1997   • TUBAL ABDOMINAL LIGATION Bilateral 2015       Problem List:  Patient Active Problem List   Diagnosis   • Seasonal allergies   • Reduced libido   • Class 2 severe obesity due to excess calories with serious comorbidity and body mass index (BMI) of 38.0 to 38.9 in adult ([facility])   • Severe episode of recurrent major depressive disorder, without psychotic features ([facility])   • Essential hypertension   • Hot flashes due to menopause   • Generalized anxiety disorder with panic attacks   • Varicose veins of lower extremity   • Mixed hyperlipidemia   • Chest pain   • Epigastric pain   • Diffuse arthralgia   • Other insomnia    • Bipolar II disorder (HCC)       Allergy:   Allergies   Allergen Reactions   • Banana Nausea Only   • Kiwi Extract Nausea Only   • Penicillin V Potassium Swelling   • Milk Thistle Other (See Comments)        Discontinued Medications:  There are no discontinued medications.    Current Medications:   Current Outpatient Medications   Medication Sig Dispense Refill   • allopurinol (Zyloprim) 100 MG tablet Take 1 tablet by mouth 2 (Two) Times a Day. 180 tablet 1   • buPROPion XL (Wellbutrin XL) 300 MG 24 hr tablet Take 1 tablet by mouth Daily. 90 tablet 1   • celecoxib (CeleBREX) 200 MG capsule Take 1 capsule by mouth 2 (Two) Times a Day As Needed for Moderate Pain . Take with food! 60 capsule 5   • cetirizine (zyrTEC) 10 MG tablet Take 1 tablet by mouth Every Night. 90 tablet 1   • fluticasone (FLONASE) 50 MCG/ACT nasal spray 1 spray into the nostril(s) as directed by provider Daily. DO NOT SNIFF! 48 g 1   • hydrOXYzine (ATARAX) 25 MG tablet Take 1 tablet by mouth 3 (Three) Times a Day As Needed for Anxiety. 90 tablet 1   • lisinopril (PRINIVIL,ZESTRIL) 10 MG tablet TAKE 1 TABLET BY MOUTH DAILY 90 tablet 1   • omeprazole (priLOSEC) 40 MG capsule Take 40 mg by mouth Daily.     • hydrOXYzine pamoate (Vistaril) 25 MG capsule Take 1 capsule by mouth Every Night. 30 capsule 1     No current facility-administered medications for this visit.     Psychological ROS: positive for - anxiety, concentration difficulties, depression, irritability, memory difficulties, mood swings, sleep disturbances and suicidal ideation  negative for - disorientation, hallucinations or hostility    Physical Exam: Pt appears frustrated and becomes tearful during the encounter.  not currently breastfeeding.    Mental Status Exam:   Orientation:  To person, Place, Time and Situation  Memory: Recent and remote memory Deficits  Mood/Affect: Depressed, Labile, Agitated and Restricted  Suicidal Ideations: Suicidal Ideation w/o plan or anticipated  "actions.  Homicidal Ideations:  None  Hallucinations: Not demonstrated today  Delusions:  Unable to demonstrate  Obsessions: None  Behavior and Psychomotor Activity: Appropriate  Speech:  Minimal  Thought Process: Goal directed and Circum  Associations: Intact  Thought Content: Normal  Language: name objects and repeat phrases  Concentration and computation: Poor  Attention Span: Poor  Fund of Knowledge: Fair  Reliability: fair  Insight into mental health: Poor  Judgement: Fair  Impulse Control:  Fair  Hygiene: fair  Cooperation:  Evasive  Eye Contact:  Poor  Physical/Medical Issues:  Yes HLD, obesity, HTN, GERD.     PHQ-9 Depression Screening  Little interest or pleasure in doing things? 3   Feeling down, depressed, or hopeless? 3   Trouble falling or staying asleep, or sleeping too much? 3   Feeling tired or having little energy? 3   Poor appetite or overeating? 3   Feeling bad about yourself - or that you are a failure or have let yourself or your family down? 3   Trouble concentrating on things, such as reading the newspaper or watching television? 3   Moving or speaking so slowly that other people could have noticed? Or the opposite - being so fidgety or restless that you have been moving around a lot more than usual? 2   Thoughts that you would be better off dead, or of hurting yourself in some way? 2   PHQ-9 Total Score 25   If you checked off any problems, how difficult have these problems made it for you to do your work, take care of things at home, or get along with other people? Extremely dIfficult   PHQ-9: 25; severe depression.  ISIDRO-7: 21; severe anxiety.  MDQ: Negative; but likely positive. Only negative due to marking \"minor problem,\" and unknown if manic symptoms occurred at same time. 9 manic symptoms reported.    Never smoker    I advised Hyun of the risks of tobacco use.     Lab Results:   Office Visit on 09/22/2021   Component Date Value Ref Range Status   • WBC 09/22/2021 9.70  3.40 - 10.80 " 10*3/mm3 Final   • RBC 09/22/2021 4.57  3.77 - 5.28 10*6/mm3 Final   • Hemoglobin 09/22/2021 13.2  12.0 - 15.9 g/dL Final   • Hematocrit 09/22/2021 40.6  34.0 - 46.6 % Final   • MCV 09/22/2021 88.8  79.0 - 97.0 fL Final   • MCH 09/22/2021 28.9  26.6 - 33.0 pg Final   • MCHC 09/22/2021 32.5  31.5 - 35.7 g/dL Final   • RDW 09/22/2021 12.7  12.3 - 15.4 % Final   • RDW-SD 09/22/2021 40.9  37.0 - 54.0 fl Final   • MPV 09/22/2021 9.5  6.0 - 12.0 fL Final   • Platelets 09/22/2021 383  140 - 450 10*3/mm3 Final   • Neutrophil % 09/22/2021 70.5  42.7 - 76.0 % Final   • Lymphocyte % 09/22/2021 19.4* 19.6 - 45.3 % Final   • Monocyte % 09/22/2021 6.6  5.0 - 12.0 % Final   • Eosinophil % 09/22/2021 2.6  0.3 - 6.2 % Final   • Basophil % 09/22/2021 0.6  0.0 - 1.5 % Final   • Immature Grans % 09/22/2021 0.3  0.0 - 0.5 % Final   • Neutrophils, Absolute 09/22/2021 6.84  1.70 - 7.00 10*3/mm3 Final   • Lymphocytes, Absolute 09/22/2021 1.88  0.70 - 3.10 10*3/mm3 Final   • Monocytes, Absolute 09/22/2021 0.64  0.10 - 0.90 10*3/mm3 Final   • Eosinophils, Absolute 09/22/2021 0.25  0.00 - 0.40 10*3/mm3 Final   • Basophils, Absolute 09/22/2021 0.06  0.00 - 0.20 10*3/mm3 Final   • Immature Grans, Absolute 09/22/2021 0.03  0.00 - 0.05 10*3/mm3 Final   • nRBC 09/22/2021 0.0  0.0 - 0.2 /100 WBC Final   • Glucose 09/22/2021 84  65 - 99 mg/dL Final   • BUN 09/22/2021 9  6 - 20 mg/dL Final   • Creatinine 09/22/2021 0.71  0.57 - 1.00 mg/dL Final   • Sodium 09/22/2021 136  136 - 145 mmol/L Final   • Potassium 09/22/2021 4.3  3.5 - 5.2 mmol/L Final   • Chloride 09/22/2021 103  98 - 107 mmol/L Final   • CO2 09/22/2021 25.2  22.0 - 29.0 mmol/L Final   • Calcium 09/22/2021 9.7  8.6 - 10.5 mg/dL Final   • Total Protein 09/22/2021 7.6  6.0 - 8.5 g/dL Final   • Albumin 09/22/2021 4.20  3.50 - 5.20 g/dL Final   • ALT (SGPT) 09/22/2021 22  1 - 33 U/L Final   • AST (SGOT) 09/22/2021 11  1 - 32 U/L Final   • Alkaline Phosphatase 09/22/2021 83  39 - 117 U/L Final    • Total Bilirubin 09/22/2021 0.4  0.0 - 1.2 mg/dL Final   • eGFR Non African Amer 09/22/2021 91  >60 mL/min/1.73 Final   • Globulin 09/22/2021 3.4  gm/dL Final   • A/G Ratio 09/22/2021 1.2  g/dL Final   • BUN/Creatinine Ratio 09/22/2021 12.7  7.0 - 25.0 Final   • Anion Gap 09/22/2021 7.8  5.0 - 15.0 mmol/L Final   • Total Cholesterol 09/22/2021 285* 0 - 200 mg/dL Final   • Triglycerides 09/22/2021 186* 0 - 150 mg/dL Final   • HDL Cholesterol 09/22/2021 40  40 - 60 mg/dL Final   • LDL Cholesterol  09/22/2021 209* 0 - 100 mg/dL Final   • VLDL Cholesterol 09/22/2021 36  5 - 40 mg/dL Final   • LDL/HDL Ratio 09/22/2021 5.20   Final   • TSH 09/22/2021 1.560  0.270 - 4.200 uIU/mL Final   • Vitamin B-12 09/22/2021 493  211 - 946 pg/mL Final   • 25 Hydroxy, Vitamin D 09/22/2021 31.9  ng/ml Final   • Uric Acid 09/22/2021 6.6* 2.4 - 5.7 mg/dL Final   • Anti-DNA (DS) Ab Qn 09/22/2021 <1  0 - 9 IU/mL Final                                       Negative      <5                                     Equivocal  5 - 9                                     Positive      >9   • RNP Antibodies 09/22/2021 <0.2  0.0 - 0.9 AI Final   • Moralez Antibodies 09/22/2021 <0.2  0.0 - 0.9 AI Final   • Antiscleroderma-70 Antibodies 09/22/2021 <0.2  0.0 - 0.9 AI Final   • CASSANDRA SSA (RO) Ab 09/22/2021 <0.2  0.0 - 0.9 AI Final   • CASSANDRA SSB (LA) Ab 09/22/2021 <0.2  0.0 - 0.9 AI Final   • Antichromatin Antibodies 09/22/2021 <0.2  0.0 - 0.9 AI Final   • KENDRICK-1 IgG 09/22/2021 <0.2  0.0 - 0.9 AI Final   • Anti-Centromere B Antibodies 09/22/2021 <0.2  0.0 - 0.9 AI Final   • See below: 09/22/2021 Comment   Final    Comment: Autoantibody                       Disease Association  ------------------------------------------------------------                          Condition                  Frequency  ---------------------   ------------------------   ---------  Antinuclear Antibody,    SLE, mixed connective  Direct (JEREMY-D)           tissue  diseases  ---------------------   ------------------------   ---------  dsDNA                    SLE                        40 - 60%  ---------------------   ------------------------   ---------  Chromatin                Drug induced SLE                90%                           SLE                        48 - 97%  ---------------------   ------------------------   ---------  SSA (Ro)                 SLE                        25 - 35%                           Sjogren's Syndrome         40 - 70%                            Lupus                 100%  ---------------------   ------------------------   ---------  SSB (La)                 SLE                                                        10%                           Sjogren's Syndrome              30%  ---------------------   -----------------------    ---------  Sm (anti-Smith)          SLE                        15 - 30%  ---------------------   -----------------------    ---------  RNP                      Mixed Connective Tissue                           Disease                         95%  (U1 nRNP,                SLE                        30 - 50%  anti-ribonucleoprotein)  Polymyositis and/or                           Dermatomyositis                 20%  ---------------------   ------------------------   ---------  Scl-70 (antiDNA          Scleroderma (diffuse)      20 - 35%  topoisomerase)           Crest                           13%  ---------------------   ------------------------   ---------  Shelia-1                     Polymyositis and/or                           Dermatomyositis            20 - 40%  ---------------------   ------------------------   ---------  Centromere B             Scleroderma -                            Crest                           variant                         80%   • Sed Rate 2021 43* 0 - 20 mm/hr Final   • CCP Antibodies IgG/IgA 2021 8  0 - 19 units Final                              Negative                <20                            Weak positive      20 - 39                            Moderate positive  40 - 59                            Strong positive        >59   • Rheumatoid Factor Quantitative 09/22/2021 <10.0  0.0 - 14.0 IU/mL Final   Admission on 08/29/2021, Discharged on 08/29/2021   Component Date Value Ref Range Status   • QT Interval 08/29/2021 357  ms Final   • Glucose 08/29/2021 117* 65 - 99 mg/dL Final   • BUN 08/29/2021 15  6 - 20 mg/dL Final   • Creatinine 08/29/2021 0.92  0.57 - 1.00 mg/dL Final   • Sodium 08/29/2021 135* 136 - 145 mmol/L Final   • Potassium 08/29/2021 3.9  3.5 - 5.2 mmol/L Final    Slight hemolysis detected by analyzer. Results may be affected.   • Chloride 08/29/2021 99  98 - 107 mmol/L Final   • CO2 08/29/2021 24.0  22.0 - 29.0 mmol/L Final   • Calcium 08/29/2021 9.0  8.6 - 10.5 mg/dL Final   • Total Protein 08/29/2021 7.8  6.0 - 8.5 g/dL Final   • Albumin 08/29/2021 4.10  3.50 - 5.20 g/dL Final   • ALT (SGPT) 08/29/2021 25  1 - 33 U/L Final   • AST (SGOT) 08/29/2021 14  1 - 32 U/L Final   • Alkaline Phosphatase 08/29/2021 90  39 - 117 U/L Final   • Total Bilirubin 08/29/2021 <0.2  0.0 - 1.2 mg/dL Final   • eGFR Non  Amer 08/29/2021 68  >60 mL/min/1.73 Final   • Globulin 08/29/2021 3.7  gm/dL Final   • A/G Ratio 08/29/2021 1.1  g/dL Final   • BUN/Creatinine Ratio 08/29/2021 16.3  7.0 - 25.0 Final   • Anion Gap 08/29/2021 12.0  5.0 - 15.0 mmol/L Final   • Protime 08/29/2021 10.0  9.6 - 11.7 Seconds Final   • INR 08/29/2021 <0.93* 0.93 - 1.10 Final   • PTT 08/29/2021 25.0  24.0 - 31.0 seconds Final   • Lipase 08/29/2021 27  13 - 60 U/L Final   • Troponin T 08/29/2021 <0.010  0.000 - 0.030 ng/mL Final   • WBC 08/29/2021 9.10  3.40 - 10.80 10*3/mm3 Final   • RBC 08/29/2021 4.44  3.77 - 5.28 10*6/mm3 Final   • Hemoglobin 08/29/2021 13.4  12.0 - 15.9 g/dL Final   • Hematocrit 08/29/2021 39.3  34.0 - 46.6 % Final   • MCV 08/29/2021 88.5  79.0 - 97.0 fL Final   • MCH  08/29/2021 30.1  26.6 - 33.0 pg Final   • MCHC 08/29/2021 34.0  31.5 - 35.7 g/dL Final   • RDW 08/29/2021 14.1  12.3 - 15.4 % Final   • RDW-SD 08/29/2021 43.3  37.0 - 54.0 fl Final   • MPV 08/29/2021 8.0  6.0 - 12.0 fL Final   • Platelets 08/29/2021 363  140 - 450 10*3/mm3 Final   • Neutrophil % 08/29/2021 68.7  42.7 - 76.0 % Final   • Lymphocyte % 08/29/2021 23.0  19.6 - 45.3 % Final   • Monocyte % 08/29/2021 5.0  5.0 - 12.0 % Final   • Eosinophil % 08/29/2021 2.4  0.3 - 6.2 % Final   • Basophil % 08/29/2021 0.9  0.0 - 1.5 % Final   • Neutrophils, Absolute 08/29/2021 6.30  1.70 - 7.00 10*3/mm3 Final   • Lymphocytes, Absolute 08/29/2021 2.10  0.70 - 3.10 10*3/mm3 Final   • Monocytes, Absolute 08/29/2021 0.50  0.10 - 0.90 10*3/mm3 Final   • Eosinophils, Absolute 08/29/2021 0.20  0.00 - 0.40 10*3/mm3 Final   • Basophils, Absolute 08/29/2021 0.10  0.00 - 0.20 10*3/mm3 Final   • nRBC 08/29/2021 0.1  0.0 - 0.2 /100 WBC Final   • Color, UA 08/29/2021 Yellow  Yellow, Straw Final   • Appearance, UA 08/29/2021 Hazy* Clear Final    Result checked   • pH, UA 08/29/2021 6.0  5.0 - 8.0 Final   • Specific Gravity, UA 08/29/2021 1.020  1.005 - 1.030 Final   • Glucose, UA 08/29/2021 Negative  Negative Final   • Ketones, UA 08/29/2021 Negative  Negative Final   • Bilirubin, UA 08/29/2021 Negative  Negative Final   • Blood, UA 08/29/2021 Negative  Negative Final   • Protein, UA 08/29/2021 Negative  Negative Final   • Leuk Esterase, UA 08/29/2021 Negative  Negative Final   • Nitrite, UA 08/29/2021 Negative  Negative Final   • Urobilinogen, UA 08/29/2021 0.2 E.U./dL  0.2 - 1.0 E.U./dL Final   • COVID19 08/29/2021 Not Detected  Not Detected - Ref. Range Final   • Troponin T 08/29/2021 <0.010  0.000 - 0.030 ng/mL Final   • Target HR (85%) 08/29/2021 153  bpm Final   • Max. Pred. HR (100%) 08/29/2021 180  bpm Final   •  CV STRESS PROTOCOL 1 08/29/2021 Hbeer   Final   • Stage 1 08/29/2021 1   Final   • HR Stage 1 08/29/2021 119   Final    • BP Stage 1 08/29/2021 178/76   Final   • Duration Min Stage 1 08/29/2021 3   Final   • Duration Sec Stage 1 08/29/2021 0   Final   • Grade Stage 1 08/29/2021 10   Final   • Speed Stage 1 08/29/2021 1.7   Final   • BH CV STRESS METS STAGE 1 08/29/2021 5   Final   • Stage 2 08/29/2021 2   Final   • HR Stage 2 08/29/2021 147   Final   • BP Stage 2 08/29/2021 210/80   Final   • Duration Min Stage 2 08/29/2021 3   Final   • Duration Sec Stage 2 08/29/2021 0   Final   • Grade Stage 2 08/29/2021 12   Final   • Speed Stage 2 08/29/2021 2.5   Final   • BH CV STRESS METS STAGE 2 08/29/2021 7.5   Final   • Stage 3 08/29/2021 3   Final   • HR Stage 3 08/29/2021 159   Final   • BP Stage 3 08/29/2021 210/80   Final   • Duration Min Stage 3 08/29/2021 3   Final   • Duration Sec Stage 3 08/29/2021 0   Final   • Grade Stage 3 08/29/2021 14   Final   • Speed Stage 3 08/29/2021 3.4   Final   • BH CV STRESS METS STAGE 3 08/29/2021 10.0   Final   • Baseline HR 08/29/2021 75  bpm Final   • Baseline BP 08/29/2021 142/80  mmHg Final   • Peak HR 08/29/2021 159  bpm Final   • Percent Max Pred HR 08/29/2021 88.33  % Final   • Percent Target HR 08/29/2021 104  % Final   • Peak BP 08/29/2021 210/80  mmHg Final   • Recovery HR 08/29/2021 93  bpm Final   • Recovery BP 08/29/2021 138/86  mmHg Final   • Nuclear Prior Study 08/29/2021 3   Final   • BH CV REST NUCLEAR ISOTOPE DOSE 08/29/2021 6.0  mCi Final   • BH CV STRESS NUCLEAR ISOTOPE DOSE 08/29/2021 19.8  mCi Final   • QT Interval 08/29/2021 411  ms Final       Assessment/Plan   Diagnoses and all orders for this visit:    1. Severe episode of recurrent major depressive disorder, without psychotic features (HCC) (Primary)    2. Bipolar II disorder (HCC)    3. Generalized anxiety disorder with panic attacks  -     hydrOXYzine pamoate (Vistaril) 25 MG capsule; Take 1 capsule by mouth Every Night.  Dispense: 30 capsule; Refill: 1    4. Mixed hyperlipidemia    5. Other insomnia  -     hydrOXYzine  "pamoate (Vistaril) 25 MG capsule; Take 1 capsule by mouth Every Night.  Dispense: 30 capsule; Refill: 1    6. Class 2 severe obesity due to excess calories with serious comorbidity and body mass index (BMI) of 38.0 to 38.9 in adult ([facility])    PHQ-9: 25; severe depression.  ISIDRO-7: 21; severe anxiety.  MDQ: Negative; but likely positive. Only negative due to pt indicating \"minor problem,\" and she doesn't know if her manic symptoms occurred at same time. 9 manic symptoms reported.  -Cont. 300mg Wellbutin daily due to pt believed minimal benefit. Consider moving off in the future.  -Vraylar started with samples given for next 3 weeks. 1 pack of 1.5mg to take for first week, then 2 more packs of 3mg to take for an additional two weeks prior to f/u. Weight gain, increased cholesterol, TD, and excess sedation were given as possible side effects to antipsychotic use. She was hesitant but willing to try the treatment because it is a newer drug that is less likely to cause metabolic side effects and sedation.  -Her cholesterol is currently elevated, and starting Vraylar could further increase it. Given the severity of her lipid panel recently via her PCP I recommended she consider a statin, especially with Vraylar. Her cholesterol will need to be monitored closely for possible further increases after Vraylar has been started.  -Due to a stated gain of 70 pounds in less than a year her weight will need to be monitored closely for increases with addition of Vraylar, but this is less likely with this medication than others in its class so the pt is willing to try.  -Initiate 25mg Vistaril nightly due to persistent insomnia and its link to anxiety.  -Counseling was recommended and emphasized as a critical part of depression treatment and management. A list of local providers and resources was given.  -Pt completed paperwork in office today to start approval for Spravato treatment. She was educated on the time requirement and " utility of esketamine.  -F/U in 3 weeks to determine effect of the Vraylar 3mg dose after a full 2 weeks of therapy. Consider dose titration of vistaril for insomnia.    Visit Diagnoses:    ICD-10-CM ICD-9-CM   1. Severe episode of recurrent major depressive disorder, without psychotic features ([facility])  F33.2 296.33   2. Bipolar II disorder ([facility])  F31.81 296.89   3. Generalized anxiety disorder with panic attacks  F41.1 300.02    F41.0 300.01   4. Mixed hyperlipidemia  E78.2 272.2   5. Other insomnia  G47.09 780.52   6. Class 2 severe obesity due to excess calories with serious comorbidity and body mass index (BMI) of 38.0 to 38.9 in adult ([facility])  E66.01 278.01    Z68.38 V85.38       TREATMENT PLAN/GOALS: In the short-term, the patient is to continue supportive psychotherapy efforts and medications as indicated. Treatment and medication options were discussed during today's visit. Long-term the goal will be to control symptoms so they do not negatively interfere with other aspects of the patient's life. Prognosis is optimistic with implementation of the current treatment plan. Patient ackowledged and verbally consented to the treatment plan and was educated on the importance of compliance with treatment and follow-up appointments.    MEDICATION ISSUES:  INSPECT reviewed 10/18/21, and is as expected. No controlled prescriptions.  Discussed medication options and treatment plan of prescribed medication as well as the risks, benefits, and side effects including potential falls, possible impaired driving, and metabolic adversities among others. Patient counseled on a multimodal approach with healthy nutrition, healthy sleep, regular physical activity, social activity, counseling, and medication taking part in his/her psychiatric management. Patient is agreeable to the plan, and he/she agreed to call the office with any worsening of symptoms or onset of side effects. Patient is agreeable to call 911 or go to the nearest ER  should he/she begin having SI/HI or self-harming behavior.     Assisted patient in processing above session content; acknowledged and normalized patient’s thoughts, feelings, and concerns. Reviewed positive coping skills and behavior management in session with positive framing of thoughts. Allowed patient to freely discuss issues without interruption or judgment. Provided safe, confidential environment to facilitate the development of positive therapeutic relationship and encourage open and honest communication.    MEDS ORDERED DURING VISIT:  New Medications Ordered This Visit   Medications   • hydrOXYzine pamoate (Vistaril) 25 MG capsule     Sig: Take 1 capsule by mouth Every Night.     Dispense:  30 capsule     Refill:  1       Return in about 3 weeks (around 11/8/2021) for Recheck.         This document has been electronically signed by Jaleel Byers PA-C  October 18, 2021 22:55 EDT    EMR Dragon transcription disclaimer:  Some of this encounter note is an electronic transcription translation of spoken language to printed text. The electronic translation of spoken language may permit erroneous, or at times, nonsensical words or phrases to be inadvertently transcribed; Although I have reviewed the note for such errors some may still exist.

## 2021-10-21 ENCOUNTER — TELEPHONE (OUTPATIENT)
Dept: PSYCHIATRY | Facility: CLINIC | Age: 41
End: 2021-10-21

## 2021-10-21 DIAGNOSIS — F33.2 SEVERE EPISODE OF RECURRENT MAJOR DEPRESSIVE DISORDER, WITHOUT PSYCHOTIC FEATURES (HCC): Primary | Chronic | ICD-10-CM

## 2021-10-21 RX ORDER — ESKETAMINE HYDROCHLORIDE 28 MG/.2ML
56 SOLUTION NASAL 2 TIMES WEEKLY
Qty: 8 EACH | Refills: 0 | Status: SHIPPED | OUTPATIENT
Start: 2021-10-21 | End: 2021-10-22 | Stop reason: SDUPTHER

## 2021-10-21 NOTE — TELEPHONE ENCOUNTER
The patient inurance will cover Spravato please choose and send RX to [facility]

## 2021-10-22 ENCOUNTER — TELEPHONE (OUTPATIENT)
Dept: PSYCHIATRY | Facility: CLINIC | Age: 41
End: 2021-10-22

## 2021-10-22 DIAGNOSIS — F33.2 SEVERE EPISODE OF RECURRENT MAJOR DEPRESSIVE DISORDER, WITHOUT PSYCHOTIC FEATURES (HCC): Chronic | ICD-10-CM

## 2021-10-22 RX ORDER — ESKETAMINE HYDROCHLORIDE 28 MG/.2ML
56 SOLUTION NASAL 2 TIMES WEEKLY
Qty: 8 EACH | Refills: 0 | Status: SHIPPED | OUTPATIENT
Start: 2021-10-25 | End: 2021-11-22 | Stop reason: SDUPTHER

## 2021-10-22 NOTE — TELEPHONE ENCOUNTER
[facility] out of network... Spravato Rx will need to be sent to [facility]

## 2021-11-03 ENCOUNTER — OFFICE VISIT (OUTPATIENT)
Dept: FAMILY MEDICINE CLINIC | Facility: CLINIC | Age: 41
End: 2021-11-03

## 2021-11-03 ENCOUNTER — LAB (OUTPATIENT)
Dept: FAMILY MEDICINE CLINIC | Facility: CLINIC | Age: 41
End: 2021-11-03

## 2021-11-03 VITALS
WEIGHT: 248 LBS | TEMPERATURE: 98 F | OXYGEN SATURATION: 98 % | BODY MASS INDEX: 38.92 KG/M2 | SYSTOLIC BLOOD PRESSURE: 143 MMHG | DIASTOLIC BLOOD PRESSURE: 79 MMHG | HEART RATE: 77 BPM | HEIGHT: 67 IN

## 2021-11-03 DIAGNOSIS — F33.2 SEVERE EPISODE OF RECURRENT MAJOR DEPRESSIVE DISORDER, WITHOUT PSYCHOTIC FEATURES (HCC): Primary | Chronic | ICD-10-CM

## 2021-11-03 DIAGNOSIS — E78.2 MIXED HYPERLIPIDEMIA: ICD-10-CM

## 2021-11-03 DIAGNOSIS — I10 ESSENTIAL HYPERTENSION: ICD-10-CM

## 2021-11-03 PROCEDURE — 99213 OFFICE O/P EST LOW 20 MIN: CPT | Performed by: FAMILY MEDICINE

## 2021-11-03 PROCEDURE — 36415 COLL VENOUS BLD VENIPUNCTURE: CPT | Performed by: FAMILY MEDICINE

## 2021-11-03 PROCEDURE — 80061 LIPID PANEL: CPT | Performed by: FAMILY MEDICINE

## 2021-11-03 NOTE — PATIENT INSTRUCTIONS
Quality Sleep Information, Adult  Quality sleep is important for your mental and physical health. It also improves your quality of life. Quality sleep means you:  · Are asleep for most of the time you are in bed.  · Fall asleep within 30 minutes.  · Wake up no more than once a night.   · Are awake for no longer than 20 minutes if you do wake up during the night.  Most adults need 7-8 hours of quality sleep each night.  How can poor sleep affect me?  If you do not get enough quality sleep, you may have:  · Mood swings.  · Daytime sleepiness.  · Confusion.  · Decreased reaction time.  · Sleep disorders, such as insomnia and sleep apnea.  · Difficulty with:  ? Solving problems.  ? Coping with stress.  ? Paying attention.  These issues may affect your performance and productivity at work, school, and at home. Lack of sleep may also put you at higher risk for accidents, suicide, and risky behaviors.  If you do not get quality sleep you may also be at higher risk for several health problems, including:  · Infections.  · Type 2 diabetes.  · Heart disease.  · High blood pressure.  · Obesity.  · Worsening of long-term conditions, like arthritis, kidney disease, depression, Parkinson's disease, and epilepsy.  What actions can I take to get more quality sleep?         · Stick to a sleep schedule. Go to sleep and wake up at about the same time each day. Do not try to sleep less on weekdays and make up for lost sleep on weekends. This does not work.  · Try to get about 30 minutes of exercise on most days. Do not exercise 2-3 hours before going to bed.  · Limit naps during the day to 30 minutes or less.  · Do not use any products that contain nicotine or tobacco, such as cigarettes or e-cigarettes. If you need help quitting, ask your health care provider.  · Do not drink caffeinated beverages for at least 8 hours before going to bed. Coffee, tea, and some sodas contain caffeine.  · Do not drink alcohol close to bedtime.  · Do not  eat large meals close to bedtime.  · Do not take naps in the late afternoon.  · Try to get at least 30 minutes of sunlight every day. Morning sunlight is best.  · Make time to relax before bed. Reading, listening to music, or taking a hot bath promotes quality sleep.  · Make your bedroom a place that promotes quality sleep. Keep your bedroom dark, quiet, and at a comfortable room temperature. Make sure your bed is comfortable. Take out sleep distractions like TV, a computer, smartphone, and bright lights.  · If you are lying awake in bed for longer than 20 minutes, get up and do a relaxing activity until you feel sleepy.  · Work with your health care provider to treat medical conditions that may affect sleeping, such as:  ? Nasal obstruction.  ? Snoring.  ? Sleep apnea and other sleep disorders.  · Talk to your health care provider if you think any of your prescription medicines may cause you to have difficulty falling or staying asleep.  · If you have sleep problems, talk with a sleep consultant. If you think you have a sleep disorder, talk with your health care provider about getting evaluated by a specialist.  Where to find more information  · National Sleep Foundation website: https://sleepfoundation.org  · National Heart, Lung, and Blood [place] (NHLBI): www.nhlbi.nih.gov/files/docs/public/sleep/healthy_sleep.pdf  · Centers for Disease Control and Prevention (CDC): www.cdc.gov/sleep/index.html  Contact a health care provider if you:  · Have trouble getting to sleep or staying asleep.  · Often wake up very early in the morning and cannot get back to sleep.  · Have daytime sleepiness.  · Have daytime sleep attacks of suddenly falling asleep and sudden muscle weakness (narcolepsy).  · Have a tingling sensation in your legs with a strong urge to move your legs (restless legs syndrome).  · Stop breathing briefly during sleep (sleep apnea).  · Think you have a sleep disorder or are taking a medicine that is  affecting your quality of sleep.  Summary  · Most adults need 7-8 hours of quality sleep each night.  · Getting enough quality sleep is an important part of health and well-being.  · Make your bedroom a place that promotes quality sleep and avoid things that may cause you to have poor sleep, such as alcohol, caffeine, smoking, and large meals.  · Talk to your health care provider if you have trouble falling asleep or staying asleep.  This information is not intended to replace advice given to you by your health care provider. Make sure you discuss any questions you have with your health care provider.  Document Revised: 03/27/2019 Document Reviewed: 03/27/2019  Elsevier Patient Education © 2021 Elsevier Inc.

## 2021-11-03 NOTE — PROGRESS NOTES
Assessment and Plan     Problem List Items Addressed This Visit        Cardiac and Vasculature    Essential hypertension    Overview     BP not at goal, <140/90.  Encouraged low-Na+ diet & 150 min exercise/week.   Continue lisinopril 10 mg daily.  Monitoring renal function.         Mixed hyperlipidemia    Overview     Reviewed lipid panel & LDL goal.  Encouraged a diet rich in vegetables & 150 minutes of exercise weekly.  Previously declined statin therapy.  Requesting repeat lipid panel.         Relevant Orders    Lipid Panel (Completed)       Mental Health    Severe episode of recurrent major depressive disorder, without psychotic features (HCC) - Primary (Chronic)    Overview     Drug-resistant depression versus bipolar depression.  Patient has failed Prozac, Zoloft, Paxil, Depakote, and Xanax in the past.  Continue Vraylar 1.5 mg samples.  Continue Wellbutrin 300 mg daily.  Continue PRN hydroxyxine.  Followed by behavioral health. Planning to start ketamine.  RTO if symptoms worsen.         Relevant Medications    Cariprazine HCl (VRAYLAR) 1.5 MG capsule capsule        Return in about 4 months (around 3/3/2022) for Recheck BP & HLD.        Patient was given instructions and counseling regarding her condition or for health maintenance advice. Please see specific information pulled into the AVS if appropriate.        Hyun is a 41 y.o. being seen today for  Depression (follow up ), Insect Bite (spider bite right leg), and Labs Only (would like to recheck chol levels )   Subjective   History of the Present Illness     Obese white female with CMHx of MDD presents for follow up.    Depression is moderately controlled with Wellbutrin 300 mg and PRN Atarax. Likes the Vraylar sample provided in office and by behavioral health provider.  Only started mood stabilizer after meeting with psychiatry.  Patient has been prescribed ketamine by psychiatry last month but hasn't started.     Complains of insomnia since starting  "Cherylelar though reports it has been a chronic problem since childhood.  Reports going to bed between 2 AM - 4 AM and rising every day at 11 AM.  Reports multiple nighttime awakenings.  Will play with her pet or sit at her dining table if she is unable to fall asleep.  Unwilling to discuss sleep hygiene.  Interjects to report that reading a book to help her fall asleep would not be effective as she would lie awake all night trying to finish the book.    Hypertensive in office.  Reports taking her medication only few moments prior to her appointment.  Requesting repeat lipid panel.    Complains of an insect/spider bite on her right lower leg.    Social History  She  reports that she has quit smoking. She has never used smokeless tobacco. She reports current alcohol use. She reports previous drug use.  Objective   Vital Signs          Vitals:    11/03/21 1448   BP: 143/79   BP Location: Left arm   Patient Position: Sitting   Cuff Size: Large Adult   Pulse: 77   Temp: 98 °F (36.7 °C)   TempSrc: Infrared   SpO2: 98%   Weight: 112 kg (248 lb)   Height: 170.2 cm (67\")     BP Readings from Last 1 Encounters:   11/03/21 143/79     Wt Readings from Last 3 Encounters:   11/03/21 112 kg (248 lb)   09/22/21 112 kg (248 lb)   08/29/21 111 kg (245 lb 9.6 oz)   Body mass index is 38.84 kg/m².     Physical Exam  Vitals reviewed.   Constitutional:       General: She is not in acute distress.     Appearance: She is well-developed. She is obese. She is not diaphoretic.   HENT:      Head: Normocephalic and atraumatic.   Cardiovascular:      Rate and Rhythm: Normal rate and regular rhythm.   Pulmonary:      Effort: Pulmonary effort is normal.      Breath sounds: Normal breath sounds.   Skin:     General: Skin is warm and dry.      Findings: Erythema (localized reaction of right anterior LE) present.   Neurological:      Mental Status: She is alert and oriented to person, place, and time.   Psychiatric:         Mood and Affect: Affect is " inappropriate (laughing between comments).

## 2021-11-04 LAB
CHOLEST SERPL-MCNC: 239 MG/DL (ref 0–200)
HDLC SERPL-MCNC: 45 MG/DL (ref 40–60)
LDLC SERPL CALC-MCNC: 158 MG/DL (ref 0–100)
LDLC/HDLC SERPL: 3.44 {RATIO}
TRIGL SERPL-MCNC: 197 MG/DL (ref 0–150)
VLDLC SERPL-MCNC: 36 MG/DL (ref 5–40)

## 2021-11-05 DIAGNOSIS — E78.2 MIXED HYPERLIPIDEMIA: Primary | ICD-10-CM

## 2021-11-05 RX ORDER — ATORVASTATIN CALCIUM 20 MG/1
20 TABLET, FILM COATED ORAL NIGHTLY
Qty: 90 TABLET | Refills: 1 | Status: SHIPPED | OUTPATIENT
Start: 2021-11-05 | End: 2022-05-23

## 2021-11-08 ENCOUNTER — OFFICE VISIT (OUTPATIENT)
Dept: PSYCHIATRY | Facility: CLINIC | Age: 41
End: 2021-11-08

## 2021-11-08 VITALS — WEIGHT: 250 LBS | HEIGHT: 67 IN | BODY MASS INDEX: 39.24 KG/M2

## 2021-11-08 DIAGNOSIS — G47.09 OTHER INSOMNIA: Chronic | ICD-10-CM

## 2021-11-08 DIAGNOSIS — F31.81 BIPOLAR II DISORDER (HCC): Primary | Chronic | ICD-10-CM

## 2021-11-08 DIAGNOSIS — F33.2 SEVERE EPISODE OF RECURRENT MAJOR DEPRESSIVE DISORDER, WITHOUT PSYCHOTIC FEATURES (HCC): Chronic | ICD-10-CM

## 2021-11-08 DIAGNOSIS — F41.0 GENERALIZED ANXIETY DISORDER WITH PANIC ATTACKS: Chronic | ICD-10-CM

## 2021-11-08 DIAGNOSIS — F41.1 GENERALIZED ANXIETY DISORDER WITH PANIC ATTACKS: Chronic | ICD-10-CM

## 2021-11-08 PROCEDURE — 99214 OFFICE O/P EST MOD 30 MIN: CPT

## 2021-11-08 RX ORDER — TRAZODONE HYDROCHLORIDE 50 MG/1
100 TABLET ORAL NIGHTLY
Qty: 60 TABLET | Refills: 0 | Status: SHIPPED | OUTPATIENT
Start: 2021-11-08 | End: 2021-11-15 | Stop reason: SDDI

## 2021-11-15 ENCOUNTER — OFFICE VISIT (OUTPATIENT)
Dept: PSYCHIATRY | Facility: CLINIC | Age: 41
End: 2021-11-15

## 2021-11-15 DIAGNOSIS — F31.81 BIPOLAR II DISORDER (HCC): Chronic | ICD-10-CM

## 2021-11-15 DIAGNOSIS — F33.2 SEVERE EPISODE OF RECURRENT MAJOR DEPRESSIVE DISORDER, WITHOUT PSYCHOTIC FEATURES (HCC): Primary | Chronic | ICD-10-CM

## 2021-11-15 DIAGNOSIS — G47.09 OTHER INSOMNIA: ICD-10-CM

## 2021-11-15 DIAGNOSIS — F41.1 GENERALIZED ANXIETY DISORDER WITH PANIC ATTACKS: Chronic | ICD-10-CM

## 2021-11-15 DIAGNOSIS — F41.0 GENERALIZED ANXIETY DISORDER WITH PANIC ATTACKS: Chronic | ICD-10-CM

## 2021-11-15 PROCEDURE — G2082 VISIT ESKETAMINE 56M OR LESS: HCPCS

## 2021-11-15 RX ORDER — RISPERIDONE 1 MG/1
2 TABLET ORAL NIGHTLY
Qty: 30 TABLET | Refills: 0 | Status: SHIPPED | OUTPATIENT
Start: 2021-11-15 | End: 2021-11-18

## 2021-11-15 NOTE — PROGRESS NOTES
Spravato Monitoring Note    Start time of observation: 12:48pm  BP check prior to administration: 139/87    Hyun Perez   presented 11/15/2021  for clinical monitoring of self administration of Spravato. The patient used a total of 2 devices, self-administered intranasally, with a 5-minute rest between each device.    Each device was checked by kb for appropriate expiration and that 2 green indicator dots were present prior to patient administration. Afterwards, the device was checked by kb to ensure the green indicator dots were gone and the full medication was delivered.    Patient check at 1:31pm with BP reading of 144/92  Patient check at 2:51pm with BP reading of 136/83    End time of observation: 2:51pm    Patient monitored for 2 hours Patient tolerated the procedure well without complications..       NDC [id number]  LOT [id number]  EXP 2022 SEP

## 2021-11-15 NOTE — PROGRESS NOTES
"Subjective   Hyun Perez is a 41 y.o. female who presents today for Spravato tx #1.    Chief Complaint:  Treatment Resistant Depression    History of Present Illness:  Prior to Spravato:  -Hx of tx resistent depression failing numerous medications over many years, and insomnia.  -Significant FmHx of successful SA in mother, grandmother and extended family. Her mother passed away early 2021.   -FmHx of bipolar d/o w/o diagnosis herself. Admits to 9 possibly manic symptoms on MDQ.  -On 300mg Wellbutrin daily for depression with little to no efficacy.  -Struggles with SI daily without a plan or anticipating actions.  -Vraylar caused minimal improvements and worsened irritability, also caused oversedating in the day.  -Been reluctant to counseling due to being an atheist and fearing discrimination and judgement.    11/15/21: Pt presents for her first Spravato tx, which she has tolerated well.  -Pt reports minor sxs with tx including dizziness and \"closed eye visuals.\"  -Reports stopping her Vraylar due to side effect.  -Reports stopping Trazodone due to no efficacy at 100mg dose.  -Is willing to try aother medication to help with sleep. Says Lunesta was helpful in the past but caused a bad taste in her mouth the day following taking it.  -Continues to have SI. Unable to rate her dep and anx during tx today.    The following portions of the patient's history were reviewed and updated as appropriate: allergies, current medications, past family history, past medical history, past social history, past surgical history and problem list.    PAST PSYCHIATRIC HISTORY  Axis I  Affective/Bipoloar Disorder, Anxiety/Panic Disorder, ADHD  Axis II  None    PAST OUTPATIENT TREATMENT  Diagnosis treated:  Affective Disorder, Anxiety/Panic Disorder, ADHD dx in childhood.  Treatment Type:  Medication Management  Prior Psychiatric Medications:  Bupropion - takes edge off a little bit.  prozac - inefficacy.  depakote - " inefficacy.  seroquel - inefficacy.  zoloft - inefficacy.  paxil - inefficacy.  Duloxetine - inefficacy.  3mg Vraylar - oversedating and caused sugar cravings.  Trazodone - ineffective at 50-100mg nightly.  *Others the patient cannot recall the names of.  Support Groups:  None  Sequelae Of Mental Disorder:  job disruption, social isolation, family disruption, financial hardships, emotional distress.    Interval History  No change.    Side Effects  3mg Vraylar - oversedating and caused sugar cravings.    Past Medical History:  Past Medical History:   Diagnosis Date   • Anxiety      Social History:  Social History     Socioeconomic History   • Marital status: Single   Tobacco Use   • Smoking status: Former Smoker   • Smokeless tobacco: Never Used   Substance and Sexual Activity   • Alcohol use: Yes     Comment: occ wine   • Drug use: Not Currently     Family History:  Family History   Problem Relation Age of Onset   • Hypertension Mother    • Coronary artery disease Mother         CABG x4 in late 30s   • Depression Mother         mother committed suicide   • Hypertension Maternal Grandmother    • Diabetes Maternal Grandmother    • Depression Maternal Grandmother         committed suicide   • Hypertension Maternal Grandfather    • Diabetes Maternal Grandfather    • Heart attack Maternal Grandfather    • Depression Maternal Cousin         committed suicide   • Depression Maternal Aunt         committed suicide     Past Surgical History:  Past Surgical History:   Procedure Laterality Date   • LEEP  1997   • TUBAL ABDOMINAL LIGATION Bilateral 2015     Problem List:  Patient Active Problem List   Diagnosis   • Seasonal allergies   • Reduced libido   • Class 2 severe obesity due to excess calories with serious comorbidity and body mass index (BMI) of 38.0 to 38.9 in adult ([facility])   • Severe episode of recurrent major depressive disorder, without psychotic features ([facility])   • Essential hypertension   • Hot flashes due to  menopause   • Generalized anxiety disorder with panic attacks   • Varicose veins of lower extremity   • Mixed hyperlipidemia   • Chest pain   • Epigastric pain   • Diffuse arthralgia   • Other insomnia   • Bipolar II disorder (HCC)     Allergy:   Allergies   Allergen Reactions   • Banana Nausea Only   • Kiwi Extract Nausea Only   • Penicillin V Potassium Swelling   • Milk Thistle Other (See Comments)      Discontinued Medications:  Medications Discontinued During This Encounter   Medication Reason   • traZODone (DESYREL) 50 MG tablet Non-compliance   • Cariprazine HCl (Vraylar) 3 MG capsule capsule Side effects     Current Medications:   Current Outpatient Medications   Medication Sig Dispense Refill   • allopurinol (Zyloprim) 100 MG tablet Take 1 tablet by mouth 2 (Two) Times a Day. 180 tablet 1   • atorvastatin (LIPITOR) 20 MG tablet Take 1 tablet by mouth Every Night. 90 tablet 1   • buPROPion XL (Wellbutrin XL) 300 MG 24 hr tablet Take 1 tablet by mouth Daily. 90 tablet 1   • celecoxib (CeleBREX) 200 MG capsule Take 1 capsule by mouth 2 (Two) Times a Day As Needed for Moderate Pain . Take with food! 60 capsule 5   • cetirizine (zyrTEC) 10 MG tablet Take 1 tablet by mouth Every Night. 90 tablet 1   • Esketamine HCl, 56 MG Dose, (Spravato, 56 MG Dose,) Nasal Solution 4 sprays into the nostril(s) as directed by provider 2 (Two) Times a Week. 8 each 0   • fluticasone (FLONASE) 50 MCG/ACT nasal spray 1 spray into the nostril(s) as directed by provider Daily. DO NOT SNIFF! 48 g 1   • lisinopril (PRINIVIL,ZESTRIL) 10 MG tablet TAKE 1 TABLET BY MOUTH DAILY 90 tablet 1   • omeprazole (priLOSEC) 40 MG capsule Take 40 mg by mouth Daily.     • risperiDONE (RisperDAL) 1 MG tablet Take 2 tablets by mouth Every Night. 30 tablet 0     No current facility-administered medications for this visit.     Psychological ROS: positive for - anxiety, concentration difficulties, depression, irritability, memory difficulties, mood swings,  "sleep disturbances and suicidal ideation  negative for - disorientation, hallucinations or hostility    Physical Exam:  BP:   1.) Prior to tx: 139/87   2.) 40 min into tx: 144/92   3.) 2 hours into tx: 136/83    Mental Status Exam:   Orientation:  To person, Place, Time and Situation  Memory: Recent and remote memory Deficits  Mood/Affect: Depressed and restricted  Suicidal Ideations: Suicidal Ideation w/o plan or anticipated actions.  Homicidal Ideations:  None  Hallucinations: None  Delusions:  None  Obsessions: None  Behavior and Psychomotor Activity: Appropriate  Speech:  Normal  Thought Process: Goal directed and Circum  Associations: Intact  Thought Content: Normal  Language: name objects and repeat phrases  Concentration and computation: Poor  Attention Span: Poor  Fund of Knowledge: Fair  Reliability: fair  Insight into mental health: Poor  Judgement: Fair  Impulse Control:  Fair  Hygiene: fair  Cooperation:  Cooperative.  Eye Contact:  Downcast  Physical/Medical Issues:  Yes HLD, obesity, HTN, GERD.     MSE reviewed and accepted with changes from 11/8/21.    PHQ-9: 21; severe depression. Down from 25 at initial eval. (11/8/21)  ISIDRO-7: 14; moderate anxiety. Down from 21 at initial eval. (11/8/21)  MDQ: Negative; but likely positive. Only negative due to marking \"minor problem,\" and unknown if manic symptoms occurred at same time. 9 manic symptoms reported. (10/18/21)    Never smoker    I advised Hyun of the risks of tobacco use.     Lab Results: Reviewed.  Total Cholesterol: 239 (11/3/21); 285  Triglycerides: 197 (11/3/21); 186 (9/22/21)  HDL: 45 (11/3/21); 40 (9/22/21)  LDL: 158 (11/3/21); 209 (9/22/21)  Sodium: 136 (9/22/21); 135 (8/29/21)    Assessment/Plan   Diagnoses and all orders for this visit:    1. Severe episode of recurrent major depressive disorder, without psychotic features (HCC) (Primary)  -     risperiDONE (RisperDAL) 1 MG tablet; Take 2 tablets by mouth Every Night.  Dispense: 30 tablet; " Refill: 0    2. Bipolar II disorder (HCC)  -     risperiDONE (RisperDAL) 1 MG tablet; Take 2 tablets by mouth Every Night.  Dispense: 30 tablet; Refill: 0    3. Generalized anxiety disorder with panic attacks    4. Other insomnia  -     risperiDONE (RisperDAL) 1 MG tablet; Take 2 tablets by mouth Every Night.  Dispense: 30 tablet; Refill: 0    PHQ-9: 21; severe depression. Down from 25 at initial eval.  ISIDRO-7: 14; moderate anxiety. Down from 21 at initial eval.  -Start 2mg Risperidone nightly, and consider increasing to 3mg if effective at sleep and improved mood.  -d/c Trazodone due to inefficacy with sleep at 50-100mg.  -d/c Vraylar due to pt reproted side effects that became not tolerable at 3mg.  -Cont. 300mg Wellbutin daily due to pt believed minimal benefit. Consider moving off in the future.  -Consider tighter cholesterol control, if Lipitor isn't new.  -Pt told to go to the ED or psych hospital if SI turn into plans or anticipated actions.  -Pt advised to take blood pressure medication prior to Spravato appointments.  -At 2w office visit f/u, determine risperidone effective for insomnia, see if counseling was acquired. Assess for PTSD with PCL-5. Monitor cholesterol, sodium, and wt due to abnormalities, if risperidone continued with an effective maintenance dose established.    Visit Diagnoses:    ICD-10-CM ICD-9-CM   1. Severe episode of recurrent major depressive disorder, without psychotic features (HCC)  F33.2 296.33   2. Bipolar II disorder (HCC)  F31.81 296.89   3. Generalized anxiety disorder with panic attacks  F41.1 300.02    F41.0 300.01   4. Other insomnia  G47.09 780.52     TREATMENT PLAN/GOALS: In the short-term, the patient is to continue supportive psychotherapy efforts and medications as indicated. Treatment and medication options were discussed during today's visit. Long-term the goal will be to control symptoms so they do not negatively interfere with other aspects of the patient's life.  Prognosis is optimistic with implementation of the current treatment plan. Patient ackowledged and verbally consented to the treatment plan and was educated on the importance of compliance with treatment and follow-up appointments.    MEDICATION ISSUES:  INSPECT reviewed 11/15/21, and is as expected. No controlled prescriptions. Spravato filled 11/11/21.  Discussed medication options and treatment plan of prescribed medication as well as the risks, benefits, and side effects including potential falls, possible impaired driving, and metabolic adversities among others. Patient counseled on a multimodal approach with healthy nutrition, healthy sleep, regular physical activity, social activity, counseling, and medication taking part in his/her psychiatric management. Patient is agreeable to the plan, and he/she agreed to call the office with any worsening of symptoms or onset of side effects. Patient is agreeable to call 911 or go to the nearest ER should he/she begin having SI/HI or self-harming behavior.     Assisted patient in processing above session content; acknowledged and normalized patient’s thoughts, feelings, and concerns. Reviewed positive coping skills and behavior management in session with positive framing of thoughts. Allowed patient to freely discuss issues without interruption or judgment. Provided safe, confidential environment to facilitate the development of positive therapeutic relationship and encourage open and honest communication.    MEDS ORDERED DURING VISIT:  New Medications Ordered This Visit   Medications   • risperiDONE (RisperDAL) 1 MG tablet     Sig: Take 2 tablets by mouth Every Night.     Dispense:  30 tablet     Refill:  0     Return in about 4 days (around 11/19/2021) for Next scheduled follow up.    Jaleel VACA PA-C, was present in the office suite and immediately available to furnish assistance and direction throughout the entire observation time.     Patient tolerated  "the procedure well without complications. Minor blood pressure elevations.     Side effects of treatment were mild and included: dizziness and \"closed eye visuals.\"      After the observation time, the patient was assessed by me and considered stable to leave the office with assistance. Hyun Perez was advised not to drive or operate machinery until tomorrow following a full night's sleep.     This document has been electronically signed by Jaleel Byers PA-C  November 15, 2021 14:35 EST    EMR Dragon transcription disclaimer:  Some of this encounter note is an electronic transcription translation of spoken language to printed text. The electronic translation of spoken language may permit erroneous, or at times, nonsensical words or phrases to be inadvertently transcribed; Although I have reviewed the note for such errors some may still exist.   "

## 2021-11-18 RX ORDER — RISPERIDONE 2 MG/1
2 TABLET ORAL NIGHTLY
Qty: 30 TABLET | Refills: 0 | Status: SHIPPED | OUTPATIENT
Start: 2021-11-18 | End: 2021-11-29 | Stop reason: SDDI

## 2021-11-19 ENCOUNTER — OFFICE VISIT (OUTPATIENT)
Dept: PSYCHIATRY | Facility: CLINIC | Age: 41
End: 2021-11-19

## 2021-11-19 DIAGNOSIS — F31.81 BIPOLAR II DISORDER (HCC): Chronic | ICD-10-CM

## 2021-11-19 DIAGNOSIS — F41.1 GENERALIZED ANXIETY DISORDER WITH PANIC ATTACKS: Chronic | ICD-10-CM

## 2021-11-19 DIAGNOSIS — F41.0 GENERALIZED ANXIETY DISORDER WITH PANIC ATTACKS: Chronic | ICD-10-CM

## 2021-11-19 DIAGNOSIS — F33.2 SEVERE EPISODE OF RECURRENT MAJOR DEPRESSIVE DISORDER, WITHOUT PSYCHOTIC FEATURES (HCC): Primary | Chronic | ICD-10-CM

## 2021-11-19 PROCEDURE — 99415 PROLNG CLIN STAFF SVC 1ST HR: CPT

## 2021-11-19 PROCEDURE — 99215 OFFICE O/P EST HI 40 MIN: CPT

## 2021-11-19 NOTE — PROGRESS NOTES
"Subjective   Hyun Perez is a 41 y.o. female who presents today for Spravato tx #2.    Chief Complaint:  Treatment Resistant Depression    History of Present Illness:  Prior to Spravato:  -Hx of tx resistent depression failing numerous medications over many years, and insomnia.  -Significant FmHx of successful SA in mother, grandmother and extended family. Her mother passed away early 2021.   -FmHx of bipolar d/o w/o diagnosis herself. Admits to 9 possibly manic symptoms on MDQ.  -On 300mg Wellbutrin daily for depression with little to no efficacy.  -Struggles with SI daily without a plan or anticipating actions.  -Vraylar caused minimal improvements and worsened irritability, also caused oversedating in the day.  -Been reluctant to counseling due to being an atheist and fearing discrimination and judgement.    11/15/21: Pt presents for her first Spravato tx, which she has tolerated well.  -Pt reports minor sxs with tx including dizziness and \"closed eye visuals.\"  -Reports stopping her Vraylar due to side effect.  -Reports stopping Trazodone due to no efficacy at 100mg dose.  -Is willing to try aother medication to help with sleep. Says Lunesta was helpful in the past but caused a bad taste in her mouth the day following taking it.  -Continues to have SI. Unable to rate her dep and anx during tx today.    11/19/21: Pt feels improved with her depression currently ranked 5/10 without concern for anxiety.  -Reports the tx does not feel as \"strong\" as last time, but she is tolerating it well.   -Denies persisting symptoms after treatment doses have worn off.  -Feels like she really doesn't want to move today during the tx, but says she feels like she still could, but doesn't want to.  -Pt has no current concerns about the tx.  -Reports she does not get distracted as easily, and isn't \"all over the place\" moving between unfinished tasks nearly as much.  -Denies ever starting Risperidone due insurance not approving it. " Also denies AVH or HI, but continues to have SI.    The following portions of the patient's history were reviewed and updated as appropriate: allergies, current medications, past family history, past medical history, past social history, past surgical history and problem list.    PAST PSYCHIATRIC HISTORY  Axis I  Affective/Bipoloar Disorder, Anxiety/Panic Disorder, ADHD  Axis II  None    PAST OUTPATIENT TREATMENT  Diagnosis treated:  Affective Disorder, Anxiety/Panic Disorder, ADHD dx in childhood.  Treatment Type:  Medication Management  Prior Psychiatric Medications:  Bupropion - takes edge off a little bit.  prozac - inefficacy.  depakote - inefficacy.  seroquel - inefficacy.  zoloft - inefficacy.  paxil - inefficacy.  Duloxetine - inefficacy.  3mg Vraylar - oversedating and caused sugar cravings.  Trazodone - ineffective at 50-100mg nightly.  *Others the patient cannot recall the names of.  Support Groups:  None  Sequelae Of Mental Disorder:  job disruption, social isolation, family disruption, financial hardships, emotional distress.    Interval History  No change.    Side Effects  3mg Vraylar - oversedating and caused sugar cravings.    Past Medical History:  Past Medical History:   Diagnosis Date   • Anxiety      Social History:  Social History     Socioeconomic History   • Marital status: Single   Tobacco Use   • Smoking status: Former Smoker   • Smokeless tobacco: Never Used   Substance and Sexual Activity   • Alcohol use: Yes     Comment: occ wine   • Drug use: Not Currently     Family History:  Family History   Problem Relation Age of Onset   • Hypertension Mother    • Coronary artery disease Mother         CABG x4 in late 30s   • Depression Mother         mother committed suicide   • Hypertension Maternal Grandmother    • Diabetes Maternal Grandmother    • Depression Maternal Grandmother         committed suicide   • Hypertension Maternal Grandfather    • Diabetes Maternal Grandfather    • Heart attack  Maternal Grandfather    • Depression Maternal Cousin         committed suicide   • Depression Maternal Aunt         committed suicide     Past Surgical History:  Past Surgical History:   Procedure Laterality Date   • LEEP  1997   • TUBAL ABDOMINAL LIGATION Bilateral 2015     Problem List:  Patient Active Problem List   Diagnosis   • Seasonal allergies   • Reduced libido   • Class 2 severe obesity due to excess calories with serious comorbidity and body mass index (BMI) of 38.0 to 38.9 in adult ([facility])   • Severe episode of recurrent major depressive disorder, without psychotic features ([facility])   • Essential hypertension   • Hot flashes due to menopause   • Generalized anxiety disorder with panic attacks   • Varicose veins of lower extremity   • Mixed hyperlipidemia   • Chest pain   • Epigastric pain   • Diffuse arthralgia   • Other insomnia   • Bipolar II disorder ([facility])     Allergy:   Allergies   Allergen Reactions   • Banana Nausea Only   • Kiwi Extract Nausea Only   • Penicillin V Potassium Swelling   • Milk Thistle Other (See Comments)      Discontinued Medications:  There are no discontinued medications.  Current Medications:   Current Outpatient Medications   Medication Sig Dispense Refill   • allopurinol (Zyloprim) 100 MG tablet Take 1 tablet by mouth 2 (Two) Times a Day. 180 tablet 1   • atorvastatin (LIPITOR) 20 MG tablet Take 1 tablet by mouth Every Night. 90 tablet 1   • buPROPion XL (Wellbutrin XL) 300 MG 24 hr tablet Take 1 tablet by mouth Daily. 90 tablet 1   • celecoxib (CeleBREX) 200 MG capsule Take 1 capsule by mouth 2 (Two) Times a Day As Needed for Moderate Pain . Take with food! 60 capsule 5   • cetirizine (zyrTEC) 10 MG tablet Take 1 tablet by mouth Every Night. 90 tablet 1   • Esketamine HCl, 56 MG Dose, (Spravato, 56 MG Dose,) Nasal Solution 4 sprays into the nostril(s) as directed by provider 2 (Two) Times a Week. 8 each 0   • fluticasone (FLONASE) 50 MCG/ACT nasal spray 1 spray into the  nostril(s) as directed by provider Daily. DO NOT SNIFF! 48 g 1   • lisinopril (PRINIVIL,ZESTRIL) 10 MG tablet TAKE 1 TABLET BY MOUTH DAILY 90 tablet 1   • omeprazole (priLOSEC) 40 MG capsule Take 40 mg by mouth Daily.     • risperiDONE (RisperDAL) 2 MG tablet Take 1 tablet by mouth Every Night. 30 tablet 0     Current Facility-Administered Medications   Medication Dose Route Frequency Provider Last Rate Last Admin   • Esketamine HCl (56 MG Dose) Nasal Solution 56 mg  56 mg Nasal Once per day on Mon Thu Jaleel Byers PA-C   56 mg at 11/16/21 1245     Psychological ROS: positive for - anxiety, concentration difficulties, depression, irritability, memory difficulties, mood swings, sleep disturbances and suicidal ideation  negative for - disorientation, hallucinations or hostility    Physical Exam:  BP: (11/19/21)   1.) Prior to tx: 123/77   2.) 40 min into tx: 130/84   3.) 2 hours into tx: 142/84    Mental Status Exam:   Orientation:  To person, Place, Time and Situation  Memory: Recent and remote memory Deficits  Mood/Affect: Depressed and restricted  Suicidal Ideations: Suicidal Ideation w/o plan or anticipated actions.  Homicidal Ideations:  None  Hallucinations: None  Delusions:  None  Obsessions: None  Behavior and Psychomotor Activity: Appropriate  Speech:  Normal  Thought Process: Goal directed and Circum  Associations: Intact  Thought Content: Normal  Language: name objects and repeat phrases  Concentration and computation: Poor  Attention Span: Poor  Fund of Knowledge: Fair  Reliability: fair  Insight into mental health: Poor  Judgement: Fair  Impulse Control:  Fair  Hygiene: fair  Cooperation:  Cooperative.  Eye Contact:  Downcast  Physical/Medical Issues:  Yes HLD, obesity, HTN, GERD.     MSE reviewed and accepted w/o needed changes from 11/19/21.    PHQ-9: 21; severe depression. Down from 25 at initial eval. (11/8/21)  ISIDRO-7: 14; moderate anxiety. Down from 21 at initial eval. (11/8/21)  MDQ:  "Negative; but likely positive. Only negative due to marking \"minor problem,\" and unknown if manic symptoms occurred at same time. 9 manic symptoms reported. (10/18/21)    Former Smoker  I advised Hyun of the risks of tobacco use.     Lab Results: Reviewed.  Total Cholesterol: 239 (11/3/21); 285  Triglycerides: 197 (11/3/21); 186 (9/22/21)  HDL: 45 (11/3/21); 40 (9/22/21)  LDL: 158 (11/3/21); 209 (9/22/21)  Sodium: 136 (9/22/21); 135 (8/29/21)    Assessment/Plan   Diagnoses and all orders for this visit:    1. Severe episode of recurrent major depressive disorder, without psychotic features (HCC) (Primary)    2. Generalized anxiety disorder with panic attacks    3. Bipolar II disorder (HCC)    -Small improvements in mood are noticed by the patient.  -Cont. 2mg Risperidone nightly, and consider increasing to 3mg if effective at sleep and improved mood.  -Cont. 300mg Wellbutin daily due to pt believed minimal benefit. Consider moving off in the future.  -Pt told to go to the ED or psych hospital if SI turn into plans or anticipated actions.  -Pt advised to take blood pressure medication prior to Spravato appointments.  -Will continue to encourage counseling  -Assess for PTSD with PCL-5 in the future.  -In the future, monitor cholesterol, sodium, and wt due to abnormalities if antipsychotic tx continued. Consider tighter cholesterol control, if Lipitor isn't new.  -F/U in 3 days for next Spravato tx. Will continue the 56mcg dose.    Visit Diagnoses:    ICD-10-CM ICD-9-CM   1. Severe episode of recurrent major depressive disorder, without psychotic features (HCC)  F33.2 296.33   2. Generalized anxiety disorder with panic attacks  F41.1 300.02    F41.0 300.01   3. Bipolar II disorder (HCC)  F31.81 296.89      TREATMENT PLAN/GOALS: In the short-term, the patient is to continue supportive psychotherapy efforts and medications as indicated. Treatment and medication options were discussed during today's visit. Long-term the " goal will be to control symptoms so they do not negatively interfere with other aspects of the patient's life. Prognosis is optimistic with implementation of the current treatment plan. Patient ackowledged and verbally consented to the treatment plan and was educated on the importance of compliance with treatment and follow-up appointments.    MEDICATION ISSUES:  INSPECT reviewed 11/19/21, and is as expected. No controlled prescriptions. Spravato filled 11/11/21.  Discussed medication options and treatment plan of prescribed medication as well as the risks, benefits, and side effects including potential falls, possible impaired driving, and metabolic adversities among others. Patient counseled on a multimodal approach with healthy nutrition, healthy sleep, regular physical activity, social activity, counseling, and medication taking part in his/her psychiatric management. Patient is agreeable to the plan, and he/she agreed to call the office with any worsening of symptoms or onset of side effects. Patient is agreeable to call 911 or go to the nearest ER should he/she begin having SI/HI or self-harming behavior.     Assisted patient in processing above session content; acknowledged and normalized patient’s thoughts, feelings, and concerns. Reviewed positive coping skills and behavior management in session with positive framing of thoughts. Allowed patient to freely discuss issues without interruption or judgment. Provided safe, confidential environment to facilitate the development of positive therapeutic relationship and encourage open and honest communication.    MEDS ORDERED DURING VISIT:  No orders of the defined types were placed in this encounter.    Return in about 3 days (around 11/22/2021) for Recheck.    Jaleel VACA PA-C, was present in the office suite and immediately available to furnish assistance and direction throughout the entire observation time.     Patient tolerated the procedure well  "without complications. Minor blood pressure elevations.     Side effects of treatment were mild and included: dizziness and \"closed eye visuals.\"      After the observation time, the patient was assessed by me and considered stable to leave the office with assistance. Hyun Perez was advised not to drive or operate machinery until tomorrow following a full night's sleep.     This document has been electronically signed by Jaleel Byers PA-C  November 19, 2021 15:23 EST    EMR Dragon transcription disclaimer:  Some of this encounter note is an electronic transcription translation of spoken language to printed text. The electronic translation of spoken language may permit erroneous, or at times, nonsensical words or phrases to be inadvertently transcribed; Although I have reviewed the note for such errors some may still exist.   "

## 2021-11-19 NOTE — PROGRESS NOTES
Spravato Monitoring Note    Start time of observation: 12:57pm  BP check prior to administration: 123/77    Hyun Perez   presented 11/19/2021  for clinical monitoring of self administration of Spravato. The patient used a total of 2 devices, self-administered intranasally, with a 5-minute rest between each device.    Each device was checked by klb for appropriate expiration and that 2 green indicator dots were present prior to patient administration. Afterwards, the device was checked by klb to ensure the green indicator dots were gone and the full medication was delivered.    Patient check at 1:40pm with BP reading of 130/84  Patient check at 3:00pm with BP reading of 142/84    End time of observation: 3:00pm     Patient monitored for 2 hours Patient tolerated the procedure well without complications..       NDC [id number]  LOT [id number]  EXP 2022-SEP

## 2021-11-22 ENCOUNTER — OFFICE VISIT (OUTPATIENT)
Dept: PSYCHIATRY | Facility: CLINIC | Age: 41
End: 2021-11-22

## 2021-11-22 DIAGNOSIS — F33.2 SEVERE EPISODE OF RECURRENT MAJOR DEPRESSIVE DISORDER, WITHOUT PSYCHOTIC FEATURES (HCC): Chronic | ICD-10-CM

## 2021-11-22 PROCEDURE — 99215 OFFICE O/P EST HI 40 MIN: CPT

## 2021-11-22 PROCEDURE — 99415 PROLNG CLIN STAFF SVC 1ST HR: CPT

## 2021-11-22 RX ORDER — ESKETAMINE HYDROCHLORIDE 28 MG/.2ML
56 SOLUTION NASAL 2 TIMES WEEKLY
Qty: 8 EACH | Refills: 0 | Status: SHIPPED | OUTPATIENT
Start: 2021-11-22 | End: 2021-11-30 | Stop reason: DRUGHIGH

## 2021-11-22 NOTE — PROGRESS NOTES
Spravato Monitoring Note    Start time of observation: 12:50pm  BP check prior to administration: 126/77    Hyun Perez   presented 11/22/2021  for clinical monitoring of self administration of Spravato. The patient used a total of 2 devices, self-administered intranasally, with a 5-minute rest between each device.    Each device was checked by klb for appropriate expiration and that 2 green indicator dots were present prior to patient administration. Afterwards, the device was checked by klb to ensure the green indicator dots were gone and the full medication was delivered.    Patient check at 1:35pm with BP reading of 136/84  Patient check at 2:55pm with BP reading of 147/91    End time of observation: 2:55pm    Patient monitored for 2 hours Patient tolerated the procedure well without complications..       NDC [id number]  LOT [id number]  EXP 2022 SEP

## 2021-11-22 NOTE — PROGRESS NOTES
"Subjective   Hyun Perez is a 41 y.o. female who presents today for Spravato tx #3.    Chief Complaint:  Treatment Resistant Depression    History of Present Illness:  Prior to Spravato:  -Hx of tx resistent depression failing numerous medications over many years, and insomnia.  -Significant FmHx of successful SA in mother, grandmother and extended family. Her mother passed away early 2021.   -FmHx of bipolar d/o w/o diagnosis herself. Admits to 9 possibly manic symptoms on MDQ.  -On 300mg Wellbutrin daily for depression with little to no efficacy.  -Struggles with SI daily without a plan or anticipating actions.  -Vraylar caused minimal improvements and worsened irritability, also caused oversedating in the day.  -Been reluctant to counseling due to being an atheist and fearing discrimination and judgement.    11/15/21: Pt presents for her first Spravato tx, which she has tolerated well.  -Pt reports minor sxs with tx including dizziness and \"closed eye visuals.\"  -Reports stopping her Vraylar due to side effect.  -Reports stopping Trazodone due to no efficacy at 100mg dose.  -Is willing to try aother medication to help with sleep. Says Lunesta was helpful in the past but caused a bad taste in her mouth the day following taking it.  -Continues to have SI. Unable to rate her dep and anx during tx today.    11/19/21: Pt feels improved with her depression currently ranked 5/10 without concern for anxiety.  -Reports the tx does not feel as \"strong\" as last time, but she is tolerating it well.   -Denies persisting symptoms after treatment doses have worn off.  -Feels like she really doesn't want to move today during the tx, but says she feels like she still could, but doesn't want to.  -Pt has no current concerns about the tx.  -Reports she does not get distracted as easily, and isn't \"all over the place\" moving between unfinished tasks nearly as much.  -Denies ever starting Risperidone due insurance not approving it. " Also denies AVH or HI, but continues to have SI.    11/22/21: Pt currently reports her depression is back, but prior to yesterday she said she felt improved with her depression 5/10 severity at that time.  -Says she felt improved until yesterday when she started feeling very depressed again. Admits to current situational factors possible effecting how improved she may feel.  -Reports restarting Trazodone, which she now feels is helping her sleep. She is currently sleeping 5-6 hours nightly.  -Pt denies ever trying Risperidone because she decided she wants limit medication changes while trying Spravato.  -She appears slightly improved and laughs more during out encounter.  -Continues to deny any symptoms outside of her tx.  -Denies any concerns, anxiety, AVH, or SI/HI.    The following portions of the patient's history were reviewed and updated as appropriate: allergies, current medications, past family history, past medical history, past social history, past surgical history and problem list.    PAST PSYCHIATRIC HISTORY  Axis I  Affective/Bipoloar Disorder, Anxiety/Panic Disorder, ADHD  Axis II  None    PAST OUTPATIENT TREATMENT  Diagnosis treated:  Affective Disorder, Anxiety/Panic Disorder, ADHD dx in childhood.  Treatment Type:  Medication Management  Prior Psychiatric Medications:  Bupropion - takes edge off a little bit.  prozac - inefficacy.  depakote - inefficacy.  seroquel - inefficacy.  zoloft - inefficacy.  paxil - inefficacy.  Duloxetine - inefficacy.  3mg Vraylar - oversedating and caused sugar cravings.  Trazodone - ineffective at 50-100mg nightly.  *Others the patient cannot recall the names of.  Support Groups:  None  Sequelae Of Mental Disorder:  job disruption, social isolation, family disruption, financial hardships, emotional distress.    Interval History  No change.    Side Effects  3mg Vraylar - oversedating and caused sugar cravings.    Past Medical History:  Past Medical History:   Diagnosis  Date   • Anxiety      Social History:  Social History     Socioeconomic History   • Marital status: Single   Tobacco Use   • Smoking status: Former Smoker   • Smokeless tobacco: Never Used   Substance and Sexual Activity   • Alcohol use: Yes     Comment: occ wine   • Drug use: Not Currently     Family History:  Family History   Problem Relation Age of Onset   • Hypertension Mother    • Coronary artery disease Mother         CABG x4 in late 30s   • Depression Mother         mother committed suicide   • Hypertension Maternal Grandmother    • Diabetes Maternal Grandmother    • Depression Maternal Grandmother         committed suicide   • Hypertension Maternal Grandfather    • Diabetes Maternal Grandfather    • Heart attack Maternal Grandfather    • Depression Maternal Cousin         committed suicide   • Depression Maternal Aunt         committed suicide     Past Surgical History:  Past Surgical History:   Procedure Laterality Date   • LEEP  1997   • TUBAL ABDOMINAL LIGATION Bilateral 2015     Problem List:  Patient Active Problem List   Diagnosis   • Seasonal allergies   • Reduced libido   • Class 2 severe obesity due to excess calories with serious comorbidity and body mass index (BMI) of 38.0 to 38.9 in adult ([facility])   • Severe episode of recurrent major depressive disorder, without psychotic features ([facility])   • Essential hypertension   • Hot flashes due to menopause   • Generalized anxiety disorder with panic attacks   • Varicose veins of lower extremity   • Mixed hyperlipidemia   • Chest pain   • Epigastric pain   • Diffuse arthralgia   • Other insomnia   • Bipolar II disorder ([facility])     Allergy:   Allergies   Allergen Reactions   • Banana Nausea Only   • Kiwi Extract Nausea Only   • Penicillin V Potassium Swelling   • Milk Thistle Other (See Comments)      Discontinued Medications:  Medications Discontinued During This Encounter   Medication Reason   • Esketamine HCl, 56 MG Dose, (Spravato, 56 MG Dose,) Nasal  Solution Reorder     Current Medications:   Current Outpatient Medications   Medication Sig Dispense Refill   • allopurinol (Zyloprim) 100 MG tablet Take 1 tablet by mouth 2 (Two) Times a Day. 180 tablet 1   • atorvastatin (LIPITOR) 20 MG tablet Take 1 tablet by mouth Every Night. 90 tablet 1   • buPROPion XL (Wellbutrin XL) 300 MG 24 hr tablet Take 1 tablet by mouth Daily. 90 tablet 1   • celecoxib (CeleBREX) 200 MG capsule Take 1 capsule by mouth 2 (Two) Times a Day As Needed for Moderate Pain . Take with food! 60 capsule 5   • cetirizine (zyrTEC) 10 MG tablet Take 1 tablet by mouth Every Night. 90 tablet 1   • Esketamine HCl, 56 MG Dose, (Spravato, 56 MG Dose,) Nasal Solution 4 sprays into the nostril(s) as directed by provider 2 (Two) Times a Week. 8 each 0   • fluticasone (FLONASE) 50 MCG/ACT nasal spray 1 spray into the nostril(s) as directed by provider Daily. DO NOT SNIFF! 48 g 1   • lisinopril (PRINIVIL,ZESTRIL) 10 MG tablet TAKE 1 TABLET BY MOUTH DAILY 90 tablet 1   • omeprazole (priLOSEC) 40 MG capsule Take 40 mg by mouth Daily.     • risperiDONE (RisperDAL) 2 MG tablet Take 1 tablet by mouth Every Night. 30 tablet 0     Current Facility-Administered Medications   Medication Dose Route Frequency Provider Last Rate Last Admin   • Esketamine HCl (56 MG Dose) Nasal Solution 56 mg  56 mg Nasal Once per day on Mon Thu Jaleel Byers PA-C   56 mg at 11/19/21 1539     Psychological ROS: positive for - anxiety, concentration difficulties, depression, irritability, memory difficulties, mood swings, sleep disturbances and suicidal ideation  negative for - disorientation, hallucinations or hostility    Physical Exam:  BP: (11/22/21)   1.) Prior to tx: 126/77   2.) 40 min into tx: 136/84   3.) 2 hours into tx: 147/91    Mental Status Exam:   Orientation:  To person, Place, Time and Situation  Memory: Recent and remote memory Deficits  Mood/Affect: Depressed and restricted  Suicidal Ideations: Suicidal  "Ideation w/o plan or anticipated actions.  Homicidal Ideations:  None  Hallucinations: None  Delusions:  None  Obsessions: None  Behavior and Psychomotor Activity: Appropriate  Speech:  Normal  Thought Process: Goal directed and Circum  Associations: Intact  Thought Content: Normal  Language: name objects and repeat phrases  Concentration and computation: Poor  Attention Span: Poor  Fund of Knowledge: Fair  Reliability: fair  Insight into mental health: Poor  Judgement: Fair  Impulse Control:  Fair  Hygiene: fair  Cooperation:  Cooperative.  Eye Contact:  Downcast  Physical/Medical Issues:  Yes HLD, obesity, HTN, GERD.     MSE reviewed and accepted w/o needed changes from 11/19/21.    PHQ-9: 21; severe depression. Down from 25 at initial eval. (11/8/21)  ISIDRO-7: 14; moderate anxiety. Down from 21 at initial eval. (11/8/21)  MDQ: Negative; but likely positive. Only negative due to marking \"minor problem,\" and unknown if manic symptoms occurred at same time. 9 manic symptoms reported. (10/18/21)    Former Smoker  I advised Hyun of the risks of tobacco use.     Lab Results: Reviewed.  Total Cholesterol: 239 (11/3/21); 285  Triglycerides: 197 (11/3/21); 186 (9/22/21)  HDL: 45 (11/3/21); 40 (9/22/21)  LDL: 158 (11/3/21); 209 (9/22/21)  Sodium: 136 (9/22/21); 135 (8/29/21)    Assessment/Plan   Diagnoses and all orders for this visit:    1. Severe episode of recurrent major depressive disorder, without psychotic features (HCC)  -     Esketamine HCl, 56 MG Dose, (Spravato, 56 MG Dose,) Nasal Solution; 4 sprays into the nostril(s) as directed by provider 2 (Two) Times a Week.  Dispense: 8 each; Refill: 0    -Small improvement in mood continues to be noticed by the patient, will need to re-evaluate for improvements with PHQ-9 and ISIDRO-7 prior to increasing the dose.  -d/c 2mg Risperidone nightly since pt desires to not try this right now to limit medication changes at once. Reconsider this tx in the future.  -Cont. 300mg " Wellbutin daily due to pt believed minimal benefit. Consider moving off in the future.  -Pt told to go to the ED or psych hospital if SI turn into plans or anticipated actions.  -Will continue to encourage counseling.  -Assess for PTSD with PCL-5 in the future.  -In the future, monitor cholesterol, sodium, and wt especially if attempt another antipsychotic tx. Consider tighter cholesterol control, if Lipitor isn't new.  -F/U next week after the holiday for next Spravato tx. Will continue the 56mcg dose, and refill Rx for next week was sent for continuing this dose.    Visit Diagnoses:    ICD-10-CM ICD-9-CM   1. Severe episode of recurrent major depressive disorder, without psychotic features (HCC)  F33.2 296.33      TREATMENT PLAN/GOALS: In the short-term, the patient is to continue supportive psychotherapy efforts and medications as indicated. Treatment and medication options were discussed during today's visit. Long-term the goal will be to control symptoms so they do not negatively interfere with other aspects of the patient's life. Prognosis is optimistic with implementation of the current treatment plan. Patient ackowledged and verbally consented to the treatment plan and was educated on the importance of compliance with treatment and follow-up appointments.    MEDICATION ISSUES:  INSPECT reviewed 11/22/21, and is as expected. Spravato filled 11/11/21.  Discussed medication options and treatment plan of prescribed medication as well as the risks, benefits, and side effects including potential falls, possible impaired driving, and metabolic adversities among others. Patient counseled on a multimodal approach with healthy nutrition, healthy sleep, regular physical activity, social activity, counseling, and medication taking part in his/her psychiatric management. Patient is agreeable to the plan, and he/she agreed to call the office with any worsening of symptoms or onset of side effects. Patient is agreeable to  call 911 or go to the nearest ER should he/she begin having SI/HI or self-harming behavior.     Assisted patient in processing above session content; acknowledged and normalized patient’s thoughts, feelings, and concerns. Reviewed positive coping skills and behavior management in session with positive framing of thoughts. Allowed patient to freely discuss issues without interruption or judgment. Provided safe, confidential environment to facilitate the development of positive therapeutic relationship and encourage open and honest communication.    MEDS ORDERED DURING VISIT:  New Medications Ordered This Visit   Medications   • Esketamine HCl, 56 MG Dose, (Spravato, 56 MG Dose,) Nasal Solution     Si sprays into the nostril(s) as directed by provider 2 (Two) Times a Week.     Dispense:  8 each     Refill:  0     Return in about 1 week (around 2021) for Next scheduled follow up.    IJaleel PA-C, was present in the office suite and immediately available to furnish assistance and direction throughout the entire observation time.     Patient tolerated the procedure well without complications. Minor blood pressure elevations during tx.     Side effects of treatment were mild and included: dizziness and mild elevation in BP.     After the observation time, the patient was assessed by me and considered stable to leave the office with assistance. Hyun Perez was advised not to drive or operate machinery until tomorrow following a full night's sleep.     This document has been electronically signed by Jaleel Byers PA-C  2021 14:22 EST    EMR Dragon transcription disclaimer:  Some of this encounter note is an electronic transcription translation of spoken language to printed text. The electronic translation of spoken language may permit erroneous, or at times, nonsensical words or phrases to be inadvertently transcribed; Although I have reviewed the note for such errors some  may still exist.

## 2021-11-29 ENCOUNTER — OFFICE VISIT (OUTPATIENT)
Dept: PSYCHIATRY | Facility: CLINIC | Age: 41
End: 2021-11-29

## 2021-11-29 DIAGNOSIS — F31.81 BIPOLAR II DISORDER (HCC): Chronic | ICD-10-CM

## 2021-11-29 DIAGNOSIS — F33.2 SEVERE EPISODE OF RECURRENT MAJOR DEPRESSIVE DISORDER, WITHOUT PSYCHOTIC FEATURES (HCC): Primary | Chronic | ICD-10-CM

## 2021-11-29 DIAGNOSIS — F41.1 GENERALIZED ANXIETY DISORDER WITH PANIC ATTACKS: Chronic | ICD-10-CM

## 2021-11-29 DIAGNOSIS — F41.0 GENERALIZED ANXIETY DISORDER WITH PANIC ATTACKS: Chronic | ICD-10-CM

## 2021-11-29 PROCEDURE — 99215 OFFICE O/P EST HI 40 MIN: CPT

## 2021-11-29 PROCEDURE — 99415 PROLNG CLIN STAFF SVC 1ST HR: CPT

## 2021-11-29 RX ORDER — MONTELUKAST SODIUM 10 MG/1
TABLET ORAL
COMMUNITY
Start: 2021-11-22 | End: 2022-03-04 | Stop reason: SINTOL

## 2021-11-29 RX ORDER — LAMOTRIGINE 25 MG/1
TABLET ORAL
Qty: 46 TABLET | Refills: 0 | Status: SHIPPED | OUTPATIENT
Start: 2021-11-29 | End: 2021-12-17 | Stop reason: DRUGHIGH

## 2021-11-29 NOTE — PROGRESS NOTES
"Subjective   Hyun Perez is a 41 y.o. female who presents today for Spravato tx #4.    Chief Complaint:  Treatment Resistant Depression    History of Present Illness:  Prior to Spravato:  -Hx of tx resistent depression failing numerous medications over many years, and insomnia.  -Significant FmHx of successful SA in mother, grandmother and extended family. Her mother passed away early 2021.   -FmHx of bipolar d/o w/o diagnosis herself. Admits to 9 possibly manic symptoms on MDQ.  -On 300mg Wellbutrin daily for depression with little to no efficacy.  -Struggles with SI daily without a plan or anticipating actions.  -Vraylar caused minimal improvements and worsened irritability, also caused oversedating in the day.  -Been reluctant to counseling due to being an atheist and fearing discrimination and judgement.    11/15/21: Pt presents for her first Spravato tx, which she has tolerated well.  -Pt reports minor sxs with tx including dizziness and \"closed eye visuals.\"  -Reports stopping her Vraylar due to side effect.  -Reports stopping Trazodone due to no efficacy at 100mg dose.  -Is willing to try aother medication to help with sleep. Says Lunesta was helpful in the past but caused a bad taste in her mouth the day following taking it.  -Continues to have SI. Unable to rate her dep and anx during tx today.    11/19/21: Pt feels improved with her depression currently ranked 5/10 without concern for anxiety.  -Reports the tx does not feel as \"strong\" as last time, but she is tolerating it well.   -Denies persisting symptoms after treatment doses have worn off.  -Feels like she really doesn't want to move today during the tx, but says she feels like she still could, but doesn't want to.  -Pt has no current concerns about the tx.  -Reports she does not get distracted as easily, and isn't \"all over the place\" moving between unfinished tasks nearly as much.  -Denies ever starting Risperidone due insurance not approving it. " "Also denies AVH or HI, but continues to have SI.    11/22/21: Pt currently reports her depression is back, but prior to yesterday she said she felt improved with her depression 5/10 severity at that time.  -Says she felt improved until yesterday when she started feeling very depressed again. Admits to current situational factors possible effecting how improved she may feel.  -Reports restarting Trazodone, which she now feels is helping her sleep. She is currently sleeping 5-6 hours nightly.  -Pt denies ever trying Risperidone because she decided she wants limit medication changes while trying Spravato.  -She appears slightly improved and laughs more during out encounter.  -Continues to deny any symptoms outside of her tx.  -Denies any concerns, anxiety, AVH, or SI/HI.    11/29/21: Pt reports significant depression for the past few days, and says the Spravato only provides a few days of relief before her depression recurs.  -Says Spravato at the current dose nevers lasts until her next tx, and she would like to move to the higher dose.  -Reports \"unpleasant\" symptoms during the treatment such as dizziness, that have improved with recurrent treatments and do not persist when she leaves the office.  -She describes a previous manic episode years ago after starting an SSRI where she \"wrecked\" her credit by obtaining a lot of debt and stayed up without sleep dancing in her yard for a period of a few days. She is willing to try Lamictal since it is less likely to contribute to any further weight gain.  -Pt denies AVH or SI/HI.    The following portions of the patient's history were reviewed and updated as appropriate: allergies, current medications, past family history, past medical history, past social history, past surgical history and problem list.    PAST PSYCHIATRIC HISTORY  Axis I  Affective/Bipoloar Disorder, Anxiety/Panic Disorder, ADHD  Axis II  None    PAST OUTPATIENT TREATMENT  Diagnosis treated:  Affective " Disorder, Anxiety/Panic Disorder, ADHD dx in childhood.  Treatment Type:  Medication Management  Prior Psychiatric Medications:  Bupropion - takes edge off a little bit.  prozac - inefficacy.  depakote - inefficacy.  seroquel - inefficacy.  zoloft - inefficacy.  paxil - inefficacy.  Duloxetine - inefficacy.  3mg Vraylar - oversedating and caused sugar cravings.  Trazodone - ineffective at 50-100mg nightly.  *Others the patient cannot recall the names of.  Support Groups:  None  Sequelae Of Mental Disorder:  job disruption, social isolation, family disruption, financial hardships, emotional distress.    Interval History  Improved.    Side Effects  Denies.    Past Medical History:  Past Medical History:   Diagnosis Date   • Anxiety      Social History:  Social History     Socioeconomic History   • Marital status: Single   Tobacco Use   • Smoking status: Former Smoker   • Smokeless tobacco: Never Used   Substance and Sexual Activity   • Alcohol use: Yes     Comment: occ wine   • Drug use: Not Currently     Family History:  Family History   Problem Relation Age of Onset   • Hypertension Mother    • Coronary artery disease Mother         CABG x4 in late 30s   • Depression Mother         mother committed suicide   • Hypertension Maternal Grandmother    • Diabetes Maternal Grandmother    • Depression Maternal Grandmother         committed suicide   • Hypertension Maternal Grandfather    • Diabetes Maternal Grandfather    • Heart attack Maternal Grandfather    • Depression Maternal Cousin         committed suicide   • Depression Maternal Aunt         committed suicide     Past Surgical History:  Past Surgical History:   Procedure Laterality Date   • LEEDEJAN  1997   • TUBAL ABDOMINAL LIGATION Bilateral 2015     Problem List:  Patient Active Problem List   Diagnosis   • Seasonal allergies   • Reduced libido   • Class 2 severe obesity due to excess calories with serious comorbidity and body mass index (BMI) of 38.0 to 38.9 in  adult (HCC)   • Severe episode of recurrent major depressive disorder, without psychotic features ([facility])   • Essential hypertension   • Hot flashes due to menopause   • Generalized anxiety disorder with panic attacks   • Varicose veins of lower extremity   • Mixed hyperlipidemia   • Chest pain   • Epigastric pain   • Diffuse arthralgia   • Other insomnia   • Bipolar II disorder ([facility])     Allergy:   Allergies   Allergen Reactions   • Banana Nausea Only   • Kiwi Extract Nausea Only   • Penicillin V Potassium Swelling   • Milk Thistle Other (See Comments)      Discontinued Medications:  Medications Discontinued During This Encounter   Medication Reason   • risperiDONE (RisperDAL) 2 MG tablet Non-compliance   • Esketamine HCl, 56 MG Dose, (Spravato, 56 MG Dose,) Nasal Solution Dose adjustment   • Esketamine HCl (56 MG Dose) Nasal Solution 56 mg       Current Medications:   Current Outpatient Medications   Medication Sig Dispense Refill   • allopurinol (Zyloprim) 100 MG tablet Take 1 tablet by mouth 2 (Two) Times a Day. 180 tablet 1   • atorvastatin (LIPITOR) 20 MG tablet Take 1 tablet by mouth Every Night. 90 tablet 1   • buPROPion XL (Wellbutrin XL) 300 MG 24 hr tablet Take 1 tablet by mouth Daily. 90 tablet 1   • celecoxib (CeleBREX) 200 MG capsule Take 1 capsule by mouth 2 (Two) Times a Day As Needed for Moderate Pain . Take with food! 60 capsule 5   • cetirizine (zyrTEC) 10 MG tablet Take 1 tablet by mouth Every Night. 90 tablet 1   • [START ON 12/2/2021] Esketamine HCl, 84 MG Dose, Nasal Solution 6 sprays into the nostril(s) as directed by provider 2 (Two) Times a Week. 2 each 3   • fluticasone (FLONASE) 50 MCG/ACT nasal spray 1 spray into the nostril(s) as directed by provider Daily. DO NOT SNIFF! 48 g 1   • lamoTRIgine (LaMICtal) 25 MG tablet Take 1 tablet by mouth Daily for 14 days, THEN 2 tablets Daily for 16 days. 46 tablet 0   • lisinopril (PRINIVIL,ZESTRIL) 10 MG tablet TAKE 1 TABLET BY MOUTH DAILY 90 tablet  "1   • montelukast (SINGULAIR) 10 MG tablet      • omeprazole (priLOSEC) 40 MG capsule Take 40 mg by mouth Daily.       No current facility-administered medications for this visit.     Psychological ROS: positive for - anxiety, concentration difficulties, depression, irritability, memory difficulties, mood swings, sleep disturbances and suicidal ideation  negative for - disorientation, hallucinations or hostility    Physical Exam:  BP: (11/29/21)   1.) Prior to tx: 131/80   2.) 40 min into tx: 135/87   3.) 2 hours into tx: 134/87; confirmed with manual reading.  *reading of 192/142 just prior to last reading believed to be a machine error.    Mental Status Exam:   Orientation:  To person, Place, Time and Situation  Memory: Recent and remote memory Deficits  Mood/Affect: Depressed and restricted  Suicidal Ideations: Suicidal Ideation w/o plans or anticipated actions.  Homicidal Ideations:  None  Hallucinations: None  Delusions:  None  Obsessions: None  Behavior and Psychomotor Activity: Appropriate  Speech:  Normal but minimal  Thought Process: Goal directed  Associations: Intact  Thought Content: Normal  Language: name objects and repeat phrases  Concentration and computation: Poor  Attention Span: Poor  Fund of Knowledge: Fair  Reliability: fair  Insight into mental health: Poor  Judgement: Fair  Impulse Control:  Fair  Hygiene: fair  Cooperation:  Cooperative.  Eye Contact:  Downcast  Physical/Medical Issues:  Yes HLD, obesity, HTN, GERD.     MSE reviewed and accepted w/ changes from 11/22/21.    PHQ-9: 21; severe depression. Down from 25 at initial eval. (11/8/21)  ISIDRO-7: 14; moderate anxiety. Down from 21 at initial eval. (11/8/21)  MDQ: Negative; but likely positive. Only negative due to marking \"minor problem,\" and unknown if manic symptoms occurred at same time. 9 manic symptoms reported. (10/18/21)    Former Smoker  I advised Hyun of the risks of tobacco use.     Lab Results: Reviewed.  Total Cholesterol: " 239 (11/3/21); 285  Triglycerides: 197 (11/3/21); 186 (9/22/21)  HDL: 45 (11/3/21); 40 (9/22/21)  LDL: 158 (11/3/21); 209 (9/22/21)  Sodium: 136 (9/22/21); 135 (8/29/21)    Assessment/Plan   Diagnoses and all orders for this visit:    1. Severe episode of recurrent major depressive disorder, without psychotic features (HCC) (Primary)  -     Esketamine HCl, 84 MG Dose, Nasal Solution; 6 sprays into the nostril(s) as directed by provider 2 (Two) Times a Week.  Dispense: 2 each; Refill: 3    2. Bipolar II disorder (HCC)  -     lamoTRIgine (LaMICtal) 25 MG tablet; Take 1 tablet by mouth Daily for 14 days, THEN 2 tablets Daily for 16 days.  Dispense: 46 tablet; Refill: 0    3. Generalized anxiety disorder with panic attacks    -Start Lamictal 25mg for mood stabilization, and after 2 weeks pt is to move up to 50mg daily.  -^ to 84mg Spravato twice weekly for improved depression control.  -Cont. 300mg Wellbutin daily due to pt believed minimal benefit. Consider moving off in the future.  -Pt told to go to the ED or psych hospital if SI turn into plans or anticipated actions.  -Counseling continues to be encourage.  -In the future, continue to monitor cholesterol, sodium, and wt. Also, assess for PTSD with PCL-5 in the future.  -F/U in 4 days for next Spravato tx, and obtain PHQ-9 and ISIDRO-7 prior to increasing the dose at that visit.    Visit Diagnoses:    ICD-10-CM ICD-9-CM   1. Severe episode of recurrent major depressive disorder, without psychotic features (HCC)  F33.2 296.33   2. Bipolar II disorder (HCC)  F31.81 296.89   3. Generalized anxiety disorder with panic attacks  F41.1 300.02    F41.0 300.01      TREATMENT PLAN/GOALS: In the short-term, the patient is to continue supportive psychotherapy efforts and medications as indicated. Treatment and medication options were discussed during today's visit. Long-term the goal will be to control symptoms so they do not negatively interfere with other aspects of the  patient's life. Prognosis is optimistic with implementation of the current treatment plan. Patient ackowledged and verbally consented to the treatment plan and was educated on the importance of compliance with treatment and follow-up appointments.    MEDICATION ISSUES:  INSPECT reviewed 21, and is as expected. Spravato filled 21.  Discussed medication options and treatment plan of prescribed medication as well as the risks, benefits, and side effects including potential falls, possible impaired driving, and metabolic adversities among others. Patient counseled on a multimodal approach with healthy nutrition, healthy sleep, regular physical activity, social activity, counseling, and medication taking part in his/her psychiatric management. Patient is agreeable to the plan, and he/she agreed to call the office with any worsening of symptoms or onset of side effects. Patient is agreeable to call 911 or go to the nearest ER should he/she begin having SI/HI or self-harming behavior.     Assisted patient in processing above session content; acknowledged and normalized patient’s thoughts, feelings, and concerns. Reviewed positive coping skills and behavior management in session with positive framing of thoughts. Allowed patient to freely discuss issues without interruption or judgment. Provided safe, confidential environment to facilitate the development of positive therapeutic relationship and encourage open and honest communication.    MEDS ORDERED DURING VISIT:  New Medications Ordered This Visit   Medications   • lamoTRIgine (LaMICtal) 25 MG tablet     Sig: Take 1 tablet by mouth Daily for 14 days, THEN 2 tablets Daily for 16 days.     Dispense:  46 tablet     Refill:  0   • Esketamine HCl, 84 MG Dose, Nasal Solution     Si sprays into the nostril(s) as directed by provider 2 (Two) Times a Week.     Dispense:  2 each     Refill:  3     Return in about 4 days (around 12/3/2021) for Recheck.    Jaleel VACA  CINDI Byers, was present in the office suite and immediately available to furnish assistance and direction throughout the entire observation time.     Patient tolerated the procedure well without complications. Minor blood pressure elevations during tx.     Side effects of treatment were mild and included: dizziness and mild elevation in BP.     After the observation time, the patient was assessed by me and considered stable to leave the office with assistance. Hyun Perez was advised not to drive or operate machinery until tomorrow following a full night's sleep.     This document has been electronically signed by Jaleel Byers PA-C  November 30, 2021 08:57 EST    [facility] Dragon transcription disclaimer:  Some of this encounter note is an electronic transcription translation of spoken language to printed text. The electronic translation of spoken language may permit erroneous, or at times, nonsensical words or phrases to be inadvertently transcribed; Although I have reviewed the note for such errors some may still exist.

## 2021-11-29 NOTE — PROGRESS NOTES
Spravato Monitoring Note    Start time of observation: 1:00pm  BP check prior to administration: 131/80    Hyun Perez   presented 11/29/2021  for clinical monitoring of self administration of Spravato. The patient used a total of 2 devices, self-administered intranasally, with a 5-minute rest between each device.    Each device was checked by klb for appropriate expiration and that 2 green indicator dots were present prior to patient administration. Afterwards, the device was checked by klb to ensure the green indicator dots were gone and the full medication was delivered.    Patient check at 1:40pm with BP reading of 135/87  Patient check at 3:00pm with BP reading of 134/87    End time of observation: 3:00pm    Patient monitored for 2 hours Patient tolerated the procedure well without complications..       NDC [id number]  LOT [id number]  EXP 2022 Sep

## 2021-12-05 DIAGNOSIS — F33.2 SEVERE EPISODE OF RECURRENT MAJOR DEPRESSIVE DISORDER, WITHOUT PSYCHOTIC FEATURES (HCC): Chronic | ICD-10-CM

## 2021-12-05 DIAGNOSIS — G47.09 OTHER INSOMNIA: Primary | Chronic | ICD-10-CM

## 2021-12-05 RX ORDER — TRAZODONE HYDROCHLORIDE 50 MG/1
100 TABLET ORAL NIGHTLY
Qty: 60 TABLET | Refills: 0 | OUTPATIENT
Start: 2021-12-05

## 2021-12-05 RX ORDER — TRAZODONE HYDROCHLORIDE 100 MG/1
100 TABLET ORAL NIGHTLY
Qty: 30 TABLET | Refills: 0 | Status: SHIPPED | OUTPATIENT
Start: 2021-12-05 | End: 2022-01-11

## 2021-12-10 ENCOUNTER — OFFICE VISIT (OUTPATIENT)
Dept: PSYCHIATRY | Facility: CLINIC | Age: 41
End: 2021-12-10

## 2021-12-10 DIAGNOSIS — F31.81 BIPOLAR II DISORDER (HCC): Chronic | ICD-10-CM

## 2021-12-10 DIAGNOSIS — F33.2 SEVERE EPISODE OF RECURRENT MAJOR DEPRESSIVE DISORDER, WITHOUT PSYCHOTIC FEATURES (HCC): Primary | Chronic | ICD-10-CM

## 2021-12-10 DIAGNOSIS — G47.09 OTHER INSOMNIA: ICD-10-CM

## 2021-12-10 DIAGNOSIS — F41.0 GENERALIZED ANXIETY DISORDER WITH PANIC ATTACKS: Chronic | ICD-10-CM

## 2021-12-10 DIAGNOSIS — F41.1 GENERALIZED ANXIETY DISORDER WITH PANIC ATTACKS: Chronic | ICD-10-CM

## 2021-12-10 PROCEDURE — G2083 VISIT ESKETAMINE, > 56M: HCPCS

## 2021-12-10 NOTE — PROGRESS NOTES
"Subjective   Hyun Perez is a 41 y.o. female who presents today for Spravato tx #5.    Chief Complaint:  Treatment Resistant Depression    History of Present Illness:  Prior to Spravato:  -Hx of tx resistent depression failing numerous medications over many years, and insomnia.  -Significant FmHx of successful SA in mother, grandmother and extended family. Her mother passed away early 2021.   -FmHx of bipolar d/o w/o diagnosis herself. Admits to 9 possibly manic symptoms on MDQ.  -On 300mg Wellbutrin daily for depression with little to no efficacy.  -Struggles with SI daily without a plan or anticipating actions.  -Vraylar caused minimal improvements and worsened irritability, also caused oversedating in the day.  -Been reluctant to counseling due to being an atheist and fearing discrimination and judgement.    11/15/21: Pt presents for her first Spravato tx, which she has tolerated well.  -Pt reports minor sxs with tx including dizziness and \"closed eye visuals.\"  -Reports stopping her Vraylar due to side effect.  -Reports stopping Trazodone due to no efficacy at 100mg dose.  -Is willing to try aother medication to help with sleep. Says Lunesta was helpful in the past but caused a bad taste in her mouth the day following taking it.  -Continues to have SI. Unable to rate her dep and anx during tx today.    11/19/21: Pt feels improved with her depression currently ranked 5/10 without concern for anxiety.  -Reports the tx does not feel as \"strong\" as last time, but she is tolerating it well.   -Denies persisting symptoms after treatment doses have worn off.  -Feels like she really doesn't want to move today during the tx, but says she feels like she still could, but doesn't want to.  -Pt has no current concerns about the tx.  -Reports she does not get distracted as easily, and isn't \"all over the place\" moving between unfinished tasks nearly as much.  -Denies ever starting Risperidone due insurance not approving it. " "Also denies AVH or HI, but continues to have SI.    11/22/21: Pt currently reports her depression is back, but prior to yesterday she said she felt improved with her depression 5/10 severity at that time.  -Says she felt improved until yesterday when she started feeling very depressed again. Admits to current situational factors possible effecting how improved she may feel.  -Reports restarting Trazodone, which she now feels is helping her sleep. She is currently sleeping 5-6 hours nightly.  -Pt denies ever trying Risperidone because she decided she wants limit medication changes while trying Spravato.  -She appears slightly improved and laughs more during out encounter.  -Continues to deny any symptoms outside of her tx.  -Denies any concerns, anxiety, AVH, or SI/HI.    11/29/21: Pt reports significant depression for the past few days, and says the Spravato only provides a few days of relief before her depression recurs.  -Says Spravato at the current dose nevers lasts until her next tx, and she would like to move to the higher dose.  -Reports \"unpleasant\" symptoms during the treatment such as dizziness, that have improved with recurrent treatments and do not persist when she leaves the office.  -She describes a previous manic episode years ago after starting an SSRI where she \"wrecked\" her credit by obtaining a lot of debt and stayed up without sleep dancing in her yard for a period of a few days. She is willing to try Lamictal since it is less likely to contribute to any further weight gain.  -Pt denies AVH or HI. Continues to struggle with SI without plans or anticipated actions.    12/10/21: Pt says she is \"ok\" and doing \"fine.\" Not significantly depressed, and slightly improved. Says the holidays are a difficult time of year for her.  -Reports benefit with Trazodone for sleep, and admits she has been taking Lamictal, but has forgotten to take it on a few occasions. She has not moved up to the 50mg dose " yet.  -Has found an atheist friendly therapist she hopes to get in with at the beginning of the year.  -Reports the Spravato unpleasant dizziness effects immediately after administration is not a bad as previously, even with this being her first 84mg dose. Feels she tolerated it well.  -Denies AVH or HI. Continues to struggle with SI without plans or anticipated actions.    The following portions of the patient's history were reviewed and updated as appropriate: allergies, current medications, past family history, past medical history, past social history, past surgical history and problem list.    PAST PSYCHIATRIC HISTORY  Axis I  Affective/Bipoloar Disorder, Anxiety/Panic Disorder, ADHD  Axis II  None    PAST OUTPATIENT TREATMENT  Diagnosis treated:  Affective Disorder, Anxiety/Panic Disorder, ADHD dx in childhood.  Treatment Type:  Medication Management  Prior Psychiatric Medications:  Bupropion - takes edge off a little bit.  prozac - inefficacy.  depakote - inefficacy.  seroquel - inefficacy.  zoloft - inefficacy.  paxil - inefficacy.  Duloxetine - inefficacy.  3mg Vraylar - oversedating and caused sugar cravings.  Trazodone - ineffective at 50-100mg nightly.  *Others the patient cannot recall the names of.  Support Groups:  None  Sequelae Of Mental Disorder:  job disruption, social isolation, family disruption, financial hardships, emotional distress.    Interval History  Improved.    Side Effects  Denies.    Past Medical History:  Past Medical History:   Diagnosis Date   • Anxiety      Social History:  Social History     Socioeconomic History   • Marital status: Single   Tobacco Use   • Smoking status: Former Smoker   • Smokeless tobacco: Never Used   Substance and Sexual Activity   • Alcohol use: Yes     Comment: occ wine   • Drug use: Not Currently     Family History:  Family History   Problem Relation Age of Onset   • Hypertension Mother    • Coronary artery disease Mother         CABG x4 in late 30s   •  Depression Mother         mother committed suicide   • Hypertension Maternal Grandmother    • Diabetes Maternal Grandmother    • Depression Maternal Grandmother         committed suicide   • Hypertension Maternal Grandfather    • Diabetes Maternal Grandfather    • Heart attack Maternal Grandfather    • Depression Maternal Cousin         committed suicide   • Depression Maternal Aunt         committed suicide     Past Surgical History:  Past Surgical History:   Procedure Laterality Date   • LEEP  1997   • TUBAL ABDOMINAL LIGATION Bilateral 2015     Problem List:  Patient Active Problem List   Diagnosis   • Seasonal allergies   • Reduced libido   • Class 2 severe obesity due to excess calories with serious comorbidity and body mass index (BMI) of 38.0 to 38.9 in adult ([facility])   • Severe episode of recurrent major depressive disorder, without psychotic features ([facility])   • Essential hypertension   • Hot flashes due to menopause   • Generalized anxiety disorder with panic attacks   • Varicose veins of lower extremity   • Mixed hyperlipidemia   • Chest pain   • Epigastric pain   • Diffuse arthralgia   • Other insomnia   • Bipolar II disorder ([facility])     Allergy:   Allergies   Allergen Reactions   • Banana Nausea Only   • Kiwi Extract Nausea Only   • Penicillin V Potassium Swelling   • Milk Thistle Other (See Comments)      Discontinued Medications:  There are no discontinued medications.   Current Medications:   Current Outpatient Medications   Medication Sig Dispense Refill   • allopurinol (Zyloprim) 100 MG tablet Take 1 tablet by mouth 2 (Two) Times a Day. 180 tablet 1   • atorvastatin (LIPITOR) 20 MG tablet Take 1 tablet by mouth Every Night. 90 tablet 1   • buPROPion XL (Wellbutrin XL) 300 MG 24 hr tablet Take 1 tablet by mouth Daily. 90 tablet 1   • celecoxib (CeleBREX) 200 MG capsule Take 1 capsule by mouth 2 (Two) Times a Day As Needed for Moderate Pain . Take with food! 60 capsule 5   • cetirizine (zyrTEC) 10 MG tablet  Take 1 tablet by mouth Every Night. 90 tablet 1   • Esketamine HCl, 84 MG Dose, Nasal Solution 6 sprays into the nostril(s) as directed by provider 2 (Two) Times a Week. 2 each 3   • fluticasone (FLONASE) 50 MCG/ACT nasal spray 1 spray into the nostril(s) as directed by provider Daily. DO NOT SNIFF! 48 g 1   • lamoTRIgine (LaMICtal) 25 MG tablet Take 1 tablet by mouth Daily for 14 days, THEN 2 tablets Daily for 16 days. 46 tablet 0   • lisinopril (PRINIVIL,ZESTRIL) 10 MG tablet TAKE 1 TABLET BY MOUTH DAILY 90 tablet 1   • montelukast (SINGULAIR) 10 MG tablet      • omeprazole (priLOSEC) 40 MG capsule Take 40 mg by mouth Daily.     • traZODone (DESYREL) 100 MG tablet Take 1 tablet by mouth Every Night. 30 tablet 0     Current Facility-Administered Medications   Medication Dose Route Frequency Provider Last Rate Last Admin   • [START ON 12/13/2021] Esketamine HCl (84 MG Dose) Nasal Solution 84 mg  84 mg Nasal Once per day on Mon Thu Jaleel Byers PA-C         Psychological ROS: positive for - anxiety, concentration difficulties, depression, irritability, memory difficulties, mood swings, sleep disturbances and suicidal ideation  negative for - disorientation, hallucinations or hostility    Physical Exam:  BP: (12/10/21)   1.) Prior to tx: 134/93   2.) 40 min into tx: 144/101   3.) 2 hours into tx: 141/98    Mental Status Exam:   Orientation:  To person, Place, Time and Situation  Memory: Recent and remote memory Deficits  Mood/Affect: Depressed and restricted  Suicidal Ideations: Suicidal Ideation w/o plans or anticipated actions.  Homicidal Ideations:  None  Hallucinations: None  Delusions:  None  Obsessions: None  Behavior and Psychomotor Activity: Appropriate  Speech:  Normal but minimal  Thought Process: Goal directed  Associations: Intact  Thought Content: Normal  Language: name objects and repeat phrases  Concentration and computation: Fair  Attention Span: Fair  Fund of Knowledge: Fair  Reliability:  "fair  Insight into mental health: Fair  Judgement: Fair  Impulse Control:  Fair  Hygiene: Good  Cooperation:  Cooperative  Eye Contact:  Fair  Physical/Medical Issues:  Yes HLD, obesity, HTN, GERD.     MSE reviewed and accepted w/ changes from 11/29/21.    PHQ-9 Depression Screening  Little interest or pleasure in doing things? 3   Feeling down, depressed, or hopeless? 1   Trouble falling or staying asleep, or sleeping too much? 3   Feeling tired or having little energy? 3   Poor appetite or overeating? 1   Feeling bad about yourself - or that you are a failure or have let yourself or your family down? 3   Trouble concentrating on things, such as reading the newspaper or watching television? 3   Moving or speaking so slowly that other people could have noticed? Or the opposite - being so fidgety or restless that you have been moving around a lot more than usual? 0   Thoughts that you would be better off dead, or of hurting yourself in some way? 2   PHQ-9 Total Score 19   If you checked off any problems, how difficult have these problems made it for you to do your work, take care of things at home, or get along with other people? Very difficult   PHQ-9: 19; moderately-severe depression. Down from 21 previously and 25 at initial eval.  ISIDRO-7: 15; severe anxiety. Up from 14 previously but down from 21 at initial eval.  MDQ: Negative; but likely positive. Only negative due to marking \"minor problem,\" and unknown if manic symptoms occurred at same time. 9 manic symptoms reported. (10/18/21)    Former Smoker  I advised Hyun of the risks of tobacco use.     Lab Results: Reviewed.  Total Cholesterol: 239 (11/3/21); 285  Triglycerides: 197 (11/3/21); 186 (9/22/21)  HDL: 45 (11/3/21); 40 (9/22/21)  LDL: 158 (11/3/21); 209 (9/22/21)  Sodium: 136 (9/22/21); 135 (8/29/21)    Assessment/Plan   Diagnoses and all orders for this visit:    1. Severe episode of recurrent major depressive disorder, without psychotic features (HCC) " (Primary)  -     Esketamine HCl (84 MG Dose) Nasal Solution 84 mg    2. Bipolar II disorder (HCC)    3. Generalized anxiety disorder with panic attacks    4. Other insomnia    -Cont. 25mg Lamictal with upward titration to 50mg for improved mood instability and depression control.  -Tolerated 84mg Spravato tx well, and will continue this dose twice weekly for improved depression control.  -Cont. 300mg Wellbutin daily due to pt believed minimal benefit. Consider moving off in the future.  -Pt told to go to the ED or psych hospital if SI turn into plans or anticipated actions.  -Counseling continues to be encourage, [facility]  -In the future, continue to monitor cholesterol, sodium, and wt. Also, assess for PTSD with PCL-5 in the future.  -F/U in 3 days for next Spravato tx.    Visit Diagnoses:    ICD-10-CM ICD-9-CM   1. Severe episode of recurrent major depressive disorder, without psychotic features (HCC)  F33.2 296.33   2. Bipolar II disorder (HCC)  F31.81 296.89   3. Generalized anxiety disorder with panic attacks  F41.1 300.02    F41.0 300.01   4. Other insomnia  G47.09 780.52      TREATMENT PLAN/GOALS: In the short-term, the patient is to continue supportive psychotherapy efforts and medications as indicated. Treatment and medication options were discussed during today's visit. Long-term the goal will be to control symptoms so they do not negatively interfere with other aspects of the patient's life. Prognosis is optimistic with implementation of the current treatment plan. Patient ackowledged and verbally consented to the treatment plan and was educated on the importance of compliance with treatment and follow-up appointments.    MEDICATION ISSUES:  INSPECT reviewed 12/10/21, and is as expected. Spravato 84mg dose pack filled 12/6/21.  Discussed medication options and treatment plan of prescribed medication as well as the  risks, benefits, and side effects including potential falls, possible impaired driving, and metabolic adversities among others. Patient counseled on a multimodal approach with healthy nutrition, healthy sleep, regular physical activity, social activity, counseling, and medication taking part in his/her psychiatric management. Patient is agreeable to the plan, and he/she agreed to call the office with any worsening of symptoms or onset of side effects. Patient is agreeable to call 911 or go to the nearest ER should he/she begin having SI/HI or self-harming behavior.     Assisted patient in processing above session content; acknowledged and normalized patient’s thoughts, feelings, and concerns. Reviewed positive coping skills and behavior management in session with positive framing of thoughts. Allowed patient to freely discuss issues without interruption or judgment. Provided safe, confidential environment to facilitate the development of positive therapeutic relationship and encourage open and honest communication.    MEDS ORDERED DURING VISIT:  New Medications Ordered This Visit   Medications   • Esketamine HCl (84 MG Dose) Nasal Solution 84 mg     Return in about 3 days (around 12/13/2021).    IJaleel PA-C, was present in the office suite and immediately available to furnish assistance and direction throughout the entire observation time.     Patient tolerated the procedure well without complications. Minor blood pressure elevations during tx.     Side effects of treatment were mild and included: dizziness and mild elevation in BP.     After the observation time, the patient was assessed by me and considered stable to leave the office with assistance. Hyun Perez was advised not to drive or operate machinery until tomorrow following a full night's sleep.     This document has been electronically signed by Jaleel Byers PA-C  December 10, 2021 14:44 EST    EMR Dragon transcription  disclaimer:  Some of this encounter note is an electronic transcription translation of spoken language to printed text. The electronic translation of spoken language may permit erroneous, or at times, nonsensical words or phrases to be inadvertently transcribed; Although I have reviewed the note for such errors some may still exist.

## 2021-12-10 NOTE — PROGRESS NOTES
Patient's last menstrual period was 09/14/2020.   Please reference prenatal and OB flow chart for further information  PT here today for routine prenatal care  Pt endorses fetal movement and denies loss of fluid, contractions or vaginal bleeding  Pt without complaints  ROS:  Pt denies headache, dysuria, nausea/vomiting  PE:  /68   Pulse 88   Wt 209 lb (94.8 kg)   LMP 09/14/2020   BMI 37.02 kg/m²   Gen - Alert and oriented x 3  HEENT- NC/AT, CVS - RRR, Lungs - CTAB  Abd - FH 30  Appropriate fetal growth  Pt with GDM and pt seen by nutricion and pt with MFM referral pending Spravato Monitoring Note    Start time of observation: 12:20 PM  BP check prior to administration: 134/93    Hyun Perez   presented 12/10/2021  for clinical monitoring of self administration of Spravato. The patient used a total of 3 devices, self-administered intranasally, with a 5-minute rest between each device.    Each device was checked by AFP for appropriate expiration and that 2 green indicator dots were present prior to patient administration. Afterwards, the device was checked by AFP to ensure the green indicator dots were gone and the full medication was delivered.    Patient check at 1:00 PM with BP reading of 144/101  Patient check at 2:20 PM with BP reading of 141/98     End time of observation: 2:35 PM    Patient monitored for 2 hours Patient tolerated the procedure well without complications..       NDC [id number]  LOT [id number]  EXP 07/31/2023

## 2021-12-13 ENCOUNTER — OFFICE VISIT (OUTPATIENT)
Dept: PSYCHIATRY | Facility: CLINIC | Age: 41
End: 2021-12-13

## 2021-12-13 DIAGNOSIS — F41.0 GENERALIZED ANXIETY DISORDER WITH PANIC ATTACKS: Chronic | ICD-10-CM

## 2021-12-13 DIAGNOSIS — F33.1 MAJOR DEPRESSIVE DISORDER, RECURRENT, MODERATE (HCC): ICD-10-CM

## 2021-12-13 DIAGNOSIS — F41.1 GENERALIZED ANXIETY DISORDER WITH PANIC ATTACKS: Chronic | ICD-10-CM

## 2021-12-13 DIAGNOSIS — F33.2 SEVERE EPISODE OF RECURRENT MAJOR DEPRESSIVE DISORDER, WITHOUT PSYCHOTIC FEATURES (HCC): Primary | Chronic | ICD-10-CM

## 2021-12-13 DIAGNOSIS — G47.09 OTHER INSOMNIA: ICD-10-CM

## 2021-12-13 DIAGNOSIS — F31.81 BIPOLAR II DISORDER (HCC): Chronic | ICD-10-CM

## 2021-12-13 PROCEDURE — G2083 VISIT ESKETAMINE, > 56M: HCPCS

## 2021-12-13 RX ORDER — BUPROPION HYDROCHLORIDE 300 MG/1
300 TABLET ORAL DAILY
Qty: 90 TABLET | Refills: 1 | Status: SHIPPED | OUTPATIENT
Start: 2021-12-13 | End: 2022-06-10

## 2021-12-13 NOTE — PROGRESS NOTES
"Subjective   Hyun Perez is a 41 y.o. female who presents today for Spravato tx #6.    Chief Complaint:  Treatment Resistant Depression    History of Present Illness:  Prior to Spravato:  -Hx of tx resistent depression failing numerous medications over many years, and insomnia.  -Significant FmHx of successful SA in mother, grandmother and extended family. Her mother passed away early 2021.   -FmHx of bipolar d/o w/o diagnosis herself. Admits to 9 possibly manic symptoms on MDQ.  -On 300mg Wellbutrin daily for depression with little to no efficacy.  -Struggles with SI daily without a plan or anticipating actions.  -Vraylar caused minimal improvements and worsened irritability, also caused oversedating in the day.  -Been reluctant to counseling due to being an atheist and fearing discrimination and judgement.    -See previous notes for treatments on 11/15-29/21.    12/10/21: Pt says she is \"ok\" and doing \"fine.\" Not significantly depressed, and slightly improved. Says the holidays are a difficult time of year for her.  -Reports benefit with Trazodone for sleep, and admits she has been taking Lamictal, but has forgotten to take it on a few occasions. She has not moved up to the 50mg dose yet.  -Has found an atheist friendly therapist she hopes to get in with at the beginning of the year.  -Reports the Spravato unpleasant dizziness effects immediately after administration is not a bad as previously, even with this being her first 84mg dose. Feels she tolerated it well.    12/13/21: Hyun continues to sporadically take 25mg Lamictal and has not moved up to the 50mg yet.  -She says this past weekend wasn't \"as bad\" as the one before for her depression, and that the increased dose could have been responsible.  -Reports the symptoms during the treating continue to lessen with each dose.  -Continues to deny HI or AVH, and SI are less prominent with no anticipated actives or plans.    The following portions of the " patient's history were reviewed and updated as appropriate: allergies, current medications, past family history, past medical history, past social history, past surgical history and problem list.    PAST PSYCHIATRIC HISTORY  Axis I  Affective/Bipoloar Disorder, Anxiety/Panic Disorder, ADHD  Axis II  None    PAST OUTPATIENT TREATMENT  Diagnosis treated:  Affective Disorder, Anxiety/Panic Disorder, ADHD dx in childhood.  Treatment Type:  Medication Management  Prior Psychiatric Medications:  Bupropion - takes edge off a little bit.  prozac - inefficacy.  depakote - inefficacy.  seroquel - inefficacy.  zoloft - inefficacy.  paxil - inefficacy.  Duloxetine - inefficacy.  3mg Vraylar - oversedating and caused sugar cravings.  Trazodone - ineffective at 50-100mg nightly.  *Others the patient cannot recall the names of.  Support Groups:  None  Sequelae Of Mental Disorder:  job disruption, social isolation, family disruption, financial hardships, emotional distress.    Interval History  Improved.    Side Effects  Denies.    Past Medical History:  Past Medical History:   Diagnosis Date   • Anxiety      Social History:  Social History     Socioeconomic History   • Marital status: Single   Tobacco Use   • Smoking status: Former Smoker   • Smokeless tobacco: Never Used   Substance and Sexual Activity   • Alcohol use: Yes     Comment: occ wine   • Drug use: Not Currently     Family History:  Family History   Problem Relation Age of Onset   • Hypertension Mother    • Coronary artery disease Mother         CABG x4 in late 30s   • Depression Mother         mother committed suicide   • Hypertension Maternal Grandmother    • Diabetes Maternal Grandmother    • Depression Maternal Grandmother         committed suicide   • Hypertension Maternal Grandfather    • Diabetes Maternal Grandfather    • Heart attack Maternal Grandfather    • Depression Maternal Cousin         committed suicide   • Depression Maternal Aunt         committed  suicide     Past Surgical History:  Past Surgical History:   Procedure Laterality Date   • LEEP  1997   • TUBAL ABDOMINAL LIGATION Bilateral 2015     Problem List:  Patient Active Problem List   Diagnosis   • Seasonal allergies   • Reduced libido   • Class 2 severe obesity due to excess calories with serious comorbidity and body mass index (BMI) of 38.0 to 38.9 in adult ([facility])   • Severe episode of recurrent major depressive disorder, without psychotic features ([facility])   • Essential hypertension   • Hot flashes due to menopause   • Generalized anxiety disorder with panic attacks   • Varicose veins of lower extremity   • Mixed hyperlipidemia   • Chest pain   • Epigastric pain   • Diffuse arthralgia   • Other insomnia   • Bipolar II disorder ([facility])     Allergy:   Allergies   Allergen Reactions   • Banana Nausea Only   • Kiwi Extract Nausea Only   • Penicillin V Potassium Swelling   • Milk Thistle Other (See Comments)      Discontinued Medications:  Medications Discontinued During This Encounter   Medication Reason   • buPROPion XL (Wellbutrin XL) 300 MG 24 hr tablet Reorder      Current Medications:   Current Outpatient Medications   Medication Sig Dispense Refill   • allopurinol (Zyloprim) 100 MG tablet Take 1 tablet by mouth 2 (Two) Times a Day. 180 tablet 1   • atorvastatin (LIPITOR) 20 MG tablet Take 1 tablet by mouth Every Night. 90 tablet 1   • buPROPion XL (Wellbutrin XL) 300 MG 24 hr tablet Take 1 tablet by mouth Daily. 90 tablet 1   • celecoxib (CeleBREX) 200 MG capsule Take 1 capsule by mouth 2 (Two) Times a Day As Needed for Moderate Pain . Take with food! 60 capsule 5   • cetirizine (zyrTEC) 10 MG tablet Take 1 tablet by mouth Every Night. 90 tablet 1   • Esketamine HCl, 84 MG Dose, Nasal Solution 6 sprays into the nostril(s) as directed by provider 2 (Two) Times a Week. 2 each 3   • fluticasone (FLONASE) 50 MCG/ACT nasal spray 1 spray into the nostril(s) as directed by provider Daily. DO NOT SNIFF! 48 g 1   •  lamoTRIgine (LaMICtal) 25 MG tablet Take 1 tablet by mouth Daily for 14 days, THEN 2 tablets Daily for 16 days. 46 tablet 0   • lisinopril (PRINIVIL,ZESTRIL) 10 MG tablet TAKE 1 TABLET BY MOUTH DAILY 90 tablet 1   • montelukast (SINGULAIR) 10 MG tablet      • omeprazole (priLOSEC) 40 MG capsule Take 40 mg by mouth Daily.     • traZODone (DESYREL) 100 MG tablet Take 1 tablet by mouth Every Night. 30 tablet 0     Current Facility-Administered Medications   Medication Dose Route Frequency Provider Last Rate Last Admin   • Esketamine HCl (84 MG Dose) Nasal Solution 84 mg  84 mg Nasal Once per day on Mon Thu Jaleel Byers PA-C   84 mg at 12/13/21 0900     Psychological ROS: positive for - anxiety, concentration difficulties, depression, irritability, memory difficulties, mood swings, sleep disturbances and suicidal ideation  negative for - disorientation, hallucinations or hostility    Physical Exam:  BP: (12/13/21)   1.) Prior to tx: 130/71   2.) 40 min into tx: 122/73   3.) 2 hours into tx: 126/76    Mental Status Exam:   Orientation:  To person, Place, Time and Situation  Memory: Recent and remote memory Deficits  Mood/Affect: Depressed and restricted  Suicidal Ideations: Suicidal Ideation w/o plans or anticipated actions.  Homicidal Ideations:  None  Hallucinations: None  Delusions:  None  Obsessions: None  Behavior and Psychomotor Activity: Appropriate  Speech:  Normal but minimal  Thought Process: Goal directed  Associations: Intact  Thought Content: Normal  Language: name objects and repeat phrases  Concentration and computation: Fair  Attention Span: Fair  Fund of Knowledge: Fair  Reliability: fair  Insight into mental health: Fair  Judgement: Fair  Impulse Control:  Fair  Hygiene: Good  Cooperation:  Cooperative  Eye Contact:  Fair  Physical/Medical Issues:  Yes HLD, obesity, HTN, GERD.     MSE reviewed and accepted w/out needed changes from 12/10/21.    PHQ-9 Depression Screening: Done last visit a  "few days ago.  Little interest or pleasure in doing things?     Feeling down, depressed, or hopeless?     Trouble falling or staying asleep, or sleeping too much?     Feeling tired or having little energy?     Poor appetite or overeating?     Feeling bad about yourself - or that you are a failure or have let yourself or your family down?     Trouble concentrating on things, such as reading the newspaper or watching television?     Moving or speaking so slowly that other people could have noticed? Or the opposite - being so fidgety or restless that you have been moving around a lot more than usual?     Thoughts that you would be better off dead, or of hurting yourself in some way?     PHQ-9 Total Score     If you checked off any problems, how difficult have these problems made it for you to do your work, take care of things at home, or get along with other people?     PHQ-9: 19; moderately-severe depression. Down from 21 previously and 25 at initial eval.  ISIDRO-7: 15; severe anxiety. Up from 14 previously but down from 21 at initial eval.  MDQ: Negative; but likely positive. Only negative due to marking \"minor problem,\" and unknown if manic symptoms occurred at same time. 9 manic symptoms reported. (10/18/21)    Former Smoker  I advised Hyun of the risks of tobacco use.     Lab Results: Reviewed.  Total Cholesterol: 239 (11/3/21); 285  Triglycerides: 197 (11/3/21); 186 (9/22/21)  HDL: 45 (11/3/21); 40 (9/22/21)  LDL: 158 (11/3/21); 209 (9/22/21)  Sodium: 136 (9/22/21); 135 (8/29/21)    Assessment/Plan   Diagnoses and all orders for this visit:    1. Severe episode of recurrent major depressive disorder, without psychotic features (HCC) (Primary)    2. Generalized anxiety disorder with panic attacks    3. Bipolar II disorder (HCC)    4. Other insomnia    5. Major depressive disorder, recurrent, moderate (HCC)  -     buPROPion XL (Wellbutrin XL) 300 MG 24 hr tablet; Take 1 tablet by mouth Daily.  Dispense: 90 tablet; " Refill: 1    -Cont. Lamictal, and pt told to get a pill organizer to help her remember to take her medicine, and to start taking 50mg for depression control.  -Tolerated 84mg Spravato tx well, and will continue this dose twice weekly for improved depression control.  -Cont. 300mg Wellbutin daily due to pt believed minimal benefit. Consider moving off in the future. Refill requested and sent.  -Pt told to go to the ED or psych hospital if SI turn into plans or anticipated actions.  -Counseling continues to be encourage, says she is hoping to start at an atheist friendly therapist office she has contacted in January.  -In the future, continue to monitor cholesterol, sodium, and wt. Also, assess for PTSD with PCL-5 in the future.  -F/U in 4 days for next Spravato tx.    Visit Diagnoses:    ICD-10-CM ICD-9-CM   1. Severe episode of recurrent major depressive disorder, without psychotic features (HCC)  F33.2 296.33   2. Generalized anxiety disorder with panic attacks  F41.1 300.02    F41.0 300.01   3. Bipolar II disorder (HCC)  F31.81 296.89   4. Other insomnia  G47.09 780.52   5. Major depressive disorder, recurrent, moderate (HCC)  F33.1 296.32      TREATMENT PLAN/GOALS: In the short-term, the patient is to continue supportive psychotherapy efforts and medications as indicated. Treatment and medication options were discussed during today's visit. Long-term the goal will be to control symptoms so they do not negatively interfere with other aspects of the patient's life. Prognosis is optimistic with implementation of the current treatment plan. Patient ackowledged and verbally consented to the treatment plan and was educated on the importance of compliance with treatment and follow-up appointments.    MEDICATION ISSUES:  INSPECT reviewed 12/13/21, and is as expected. Spravato 84mg dose pack filled 12/6/21.  Discussed medication options and treatment plan of prescribed medication as well as the risks, benefits, and side  effects including potential falls, possible impaired driving, and metabolic adversities among others. Patient counseled on a multimodal approach with healthy nutrition, healthy sleep, regular physical activity, social activity, counseling, and medication taking part in his/her psychiatric management. Patient is agreeable to the plan, and he/she agreed to call the office with any worsening of symptoms or onset of side effects. Patient is agreeable to call 911 or go to the nearest ER should he/she begin having SI/HI or self-harming behavior.     Assisted patient in processing above session content; acknowledged and normalized patient’s thoughts, feelings, and concerns. Reviewed positive coping skills and behavior management in session with positive framing of thoughts. Allowed patient to freely discuss issues without interruption or judgment. Provided safe, confidential environment to facilitate the development of positive therapeutic relationship and encourage open and honest communication.    MEDS ORDERED DURING VISIT:  New Medications Ordered This Visit   Medications   • buPROPion XL (Wellbutrin XL) 300 MG 24 hr tablet     Sig: Take 1 tablet by mouth Daily.     Dispense:  90 tablet     Refill:  1     Return in about 4 days (around 12/17/2021) for Next scheduled follow up.    IJaleel PA-C, was present in the office suite and immediately available to furnish assistance and direction throughout the entire observation time.     Patient tolerated the procedure well without complications.     Side effects of treatment were mild and included: slight dizziness.     After the observation time, the patient was assessed by me and considered stable to leave the office with assistance. Hyun Perez was advised not to drive or operate machinery until tomorrow following a full night's sleep.     This document has been electronically signed by Jaleel Byers PA-C  December 13, 2021 21:45 EST    EMR Dragon  transcription disclaimer:  Some of this encounter note is an electronic transcription translation of spoken language to printed text. The electronic translation of spoken language may permit erroneous, or at times, nonsensical words or phrases to be inadvertently transcribed; Although I have reviewed the note for such errors some may still exist.

## 2021-12-13 NOTE — PROGRESS NOTES
Spravato Monitoring Note    Start time of observation: 12:35pm  BP check prior to administration: 130/71    Hyun Perez   presented 12/13/2021  for clinical monitoring of self administration of Spravato. The patient used a total of 3 devices, self-administered intranasally, with a 5-minute rest between each device.    Each device was checked by klb for appropriate expiration and that 2 green indicator dots were present prior to patient administration. Afterwards, the device was checked by klb to ensure the green indicator dots were gone and the full medication was delivered.    Patient check at 1:17pm with BP reading of 122/73  Patient check at 2:37pm with BP reading of 126/76    End time of observation: 2:37pm    Patient monitored for 2 hours Patient tolerated the procedure well without complications..       NDC [id number]  LOT [id number]   EXP 2023 Jul

## 2021-12-17 ENCOUNTER — OFFICE VISIT (OUTPATIENT)
Dept: PSYCHIATRY | Facility: CLINIC | Age: 41
End: 2021-12-17

## 2021-12-17 DIAGNOSIS — F33.2 SEVERE EPISODE OF RECURRENT MAJOR DEPRESSIVE DISORDER, WITHOUT PSYCHOTIC FEATURES (HCC): Primary | Chronic | ICD-10-CM

## 2021-12-17 DIAGNOSIS — F41.1 GENERALIZED ANXIETY DISORDER WITH PANIC ATTACKS: Chronic | ICD-10-CM

## 2021-12-17 DIAGNOSIS — F41.0 GENERALIZED ANXIETY DISORDER WITH PANIC ATTACKS: Chronic | ICD-10-CM

## 2021-12-17 PROCEDURE — 99415 PROLNG CLIN STAFF SVC 1ST HR: CPT

## 2021-12-17 PROCEDURE — 99215 OFFICE O/P EST HI 40 MIN: CPT

## 2021-12-17 RX ORDER — LAMOTRIGINE 100 MG/1
TABLET ORAL
Qty: 57 TABLET | Refills: 0 | Status: SHIPPED | OUTPATIENT
Start: 2021-12-17 | End: 2022-01-19 | Stop reason: DRUGHIGH

## 2021-12-17 NOTE — PROGRESS NOTES
"Subjective   Hyun Perez is a 42 y.o. female who presents today for Spravato tx #6.    Chief Complaint:  Treatment Resistant Depression    History of Present Illness:  Prior to Spravato:  -Hx of tx resistent depression failing numerous medications over many years, and insomnia.  -Significant FmHx of successful SA in mother, grandmother and extended family. Her mother passed away early 2021.   -FmHx of bipolar d/o w/o diagnosis herself. Admits to 9 possibly manic symptoms on MDQ.  -On 300mg Wellbutrin daily for depression with little to no efficacy.  -Struggles with SI daily without a plan or anticipating actions.  -Vraylar caused minimal improvements and worsened irritability, also caused oversedating in the day.  -Been reluctant to counseling due to being an atheist and fearing discrimination and judgement.    -See previous notes for treatments on 11/15-29/21.    12/10/21: Pt says she is \"ok\" and doing \"fine.\" Not significantly depressed, and slightly improved. Says the holidays are a difficult time of year for her.  -Reports benefit with Trazodone for sleep, and admits she has been taking Lamictal, but has forgotten to take it on a few occasions. She has not moved up to the 50mg dose yet.  -Has found an atheist friendly therapist she hopes to get in with at the beginning of the year.  -Reports the Spravato unpleasant dizziness effects immediately after administration is not a bad as previously, even with this being her first 84mg dose. Feels she tolerated it well.    12/13/21: Hyun continues to sporadically take 25mg Lamictal and has not moved up to the 50mg yet.  -She says this past weekend wasn't \"as bad\" as the one before for her depression, and that the increased dose could have been responsible.  -Reports the symptoms during the treating continue to lessen with each dose.  -Continues to deny HI or AVH, and SI are less prominent with no anticipated actives or plans.    12/17/21: Pt reports going up on the " Lamictal to 50mg daily, and is reluctant to continuing to go up out of concern for too much medication. However, pt is willing to continue recommended dose escalation to 200mg daily.  -Continues to tolerate Spravato tx well, and pt notices improvement in her depression but not complete remission. Remission of depression has lasted longer since increasing to the higher dose of Spravato.  -Reports lack of motivation, saying she got a pill organizer to help her remember to take her medication, but she has had the pills and organizer sitting there waiting to be filled for a week now.  -Pt was laughing with the MA prior to evaluation.  -Admits to occasional SI, but much less frequently w/o plans or anticipated actions.  -Continues to plan on starting therapy at the beginning of the year.  -Denies any noticed side effects to the Lamictal, HI, or AVH.    The following portions of the patient's history were reviewed and updated as appropriate: allergies, current medications, past family history, past medical history, past social history, past surgical history and problem list.    PAST PSYCHIATRIC HISTORY  Axis I  Affective/Bipolar Disorder, Anxiety/Panic Disorder, ADHD  Axis II  None    PAST OUTPATIENT TREATMENT  Diagnosis treated:  Affective Disorder, Anxiety/Panic Disorder, ADHD dx in childhood.  Treatment Type:  Medication Management  Prior Psychiatric Medications:  Bupropion - takes edge off a little bit.  prozac - inefficacy.  depakote - inefficacy.  seroquel - inefficacy.  zoloft - inefficacy.  paxil - inefficacy.  Duloxetine - inefficacy.  3mg Vraylar - oversedating and caused sugar cravings.  Trazodone - ineffective at 50-100mg nightly.  *Others the patient cannot recall the names of.  Support Groups:  None  Sequelae Of Mental Disorder:  job disruption, social isolation, family disruption, financial hardships, emotional distress.    Interval History  Improved.    Side Effects  Denies.    Past Medical History:  Past  Medical History:   Diagnosis Date   • Anxiety      Social History:  Social History     Socioeconomic History   • Marital status: Single   Tobacco Use   • Smoking status: Former Smoker   • Smokeless tobacco: Never Used   Vaping Use   • Vaping Use: Never used   Substance and Sexual Activity   • Alcohol use: Yes     Comment: occ wine   • Drug use: Not Currently   • Sexual activity: Defer     Family History:  Family History   Problem Relation Age of Onset   • Hypertension Mother    • Coronary artery disease Mother         CABG x4 in late 30s   • Depression Mother         mother committed suicide   • Hypertension Maternal Grandmother    • Diabetes Maternal Grandmother    • Depression Maternal Grandmother         committed suicide   • Hypertension Maternal Grandfather    • Diabetes Maternal Grandfather    • Heart attack Maternal Grandfather    • Depression Maternal Cousin         committed suicide   • Depression Maternal Aunt         committed suicide     Past Surgical History:  Past Surgical History:   Procedure Laterality Date   • LEEP  1997   • TUBAL ABDOMINAL LIGATION Bilateral 2015     Problem List:  Patient Active Problem List   Diagnosis   • Seasonal allergies   • Reduced libido   • Class 2 severe obesity due to excess calories with serious comorbidity and body mass index (BMI) of 38.0 to 38.9 in adult ([facility])   • Severe episode of recurrent major depressive disorder, without psychotic features ([facility])   • Essential hypertension   • Hot flashes due to menopause   • Generalized anxiety disorder with panic attacks   • Varicose veins of lower extremity   • Mixed hyperlipidemia   • Chest pain   • Epigastric pain   • Diffuse arthralgia   • Other insomnia   • Bipolar II disorder ([facility])     Allergy:   Allergies   Allergen Reactions   • Banana Nausea Only   • Kiwi Extract Nausea Only   • Penicillin V Potassium Swelling   • Milk Thistle Other (See Comments)      Discontinued Medications:  Medications Discontinued During This  Encounter   Medication Reason   • lamoTRIgine (LaMICtal) 25 MG tablet Dose adjustment      Current Medications:   Current Outpatient Medications   Medication Sig Dispense Refill   • allopurinol (Zyloprim) 100 MG tablet Take 1 tablet by mouth 2 (Two) Times a Day. 180 tablet 1   • atorvastatin (LIPITOR) 20 MG tablet Take 1 tablet by mouth Every Night. 90 tablet 1   • buPROPion XL (Wellbutrin XL) 300 MG 24 hr tablet Take 1 tablet by mouth Daily. 90 tablet 1   • celecoxib (CeleBREX) 200 MG capsule Take 1 capsule by mouth 2 (Two) Times a Day As Needed for Moderate Pain . Take with food! 60 capsule 5   • cetirizine (zyrTEC) 10 MG tablet Take 1 tablet by mouth Every Night. 90 tablet 1   • Esketamine HCl, 84 MG Dose, Nasal Solution 6 sprays into the nostril(s) as directed by provider 2 (Two) Times a Week. 2 each 3   • fluticasone (FLONASE) 50 MCG/ACT nasal spray 1 spray into the nostril(s) as directed by provider Daily As Needed for Rhinitis or Allergies. DO NOT SNIFF! 48 g 1   • lamoTRIgine (LaMICtal) 100 MG tablet Take 1 tablet by mouth Daily for 7 days, THEN 2 tablets Daily for 25 days. 57 tablet 0   • lisinopril (PRINIVIL,ZESTRIL) 10 MG tablet TAKE 1 TABLET BY MOUTH DAILY 90 tablet 1   • montelukast (SINGULAIR) 10 MG tablet      • omeprazole (priLOSEC) 40 MG capsule Take 40 mg by mouth Daily.     • traZODone (DESYREL) 100 MG tablet TAKE 1 TABLET BY MOUTH EVERY NIGHT 30 tablet 0     Current Facility-Administered Medications   Medication Dose Route Frequency Provider Last Rate Last Admin   • Esketamine HCl (84 MG Dose) Nasal Solution 84 mg  84 mg Nasal Once per day on Mon Thu Jaleel Byers PA-C   84 mg at 01/10/22 1226     Psychological ROS: positive for - anxiety, concentration difficulties, depression, irritability, memory difficulties, mood swings, sleep disturbances and suicidal ideation. All improved, except for concentration and focus.  negative for - disorientation, hallucinations or  hostility    Physical Exam:  BP: (12/17/21)   1.) Prior to tx: 131/77   2.) 40 min into tx: 148/91   3.) 2 hours into tx: 137/80    Mental Status Exam:   Orientation:  To person, Place, Time and Situation  Memory: Recent and remote memory Deficits  Mood/Affect: Depressed, but improved.  Suicidal Ideations: Suicidal Ideation w/o plans or anticipated actions.  Homicidal Ideations:  None  Hallucinations: None  Delusions:  None  Obsessions: None  Behavior and Psychomotor Activity: Appropriate  Speech:  Normal but minimal  Thought Process: Goal directed  Associations: Intact  Thought Content: Normal  Language: name objects and repeat phrases  Concentration and computation: Fair  Attention Span: Fair  Fund of Knowledge: Fair  Reliability: fair  Insight into mental health: Fair  Judgement: Fair  Impulse Control:  Fair  Hygiene: Good  Cooperation:  Cooperative  Eye Contact:  Fair  Physical/Medical Issues:  Yes HLD, obesity, HTN, GERD.     MSE reviewed and accepted with changes from previous visit.    PHQ-9 Depression Screening:  Little interest or pleasure in doing things? 1   Feeling down, depressed, or hopeless? 2   Trouble falling or staying asleep, or sleeping too much? 0   Feeling tired or having little energy? 2   Poor appetite or overeating? 1   Feeling bad about yourself - or that you are a failure or have let yourself or your family down? 2   Trouble concentrating on things, such as reading the newspaper or watching television? 1   Moving or speaking so slowly that other people could have noticed? Or the opposite - being so fidgety or restless that you have been moving around a lot more than usual? 0   Thoughts that you would be better off dead, or of hurting yourself in some way? 1   PHQ-9 Total Score 10   If you checked off any problems, how difficult have these problems made it for you to do your work, take care of things at home, or get along with other people? Somewhat difficult   PHQ-9: 10; moderate  "depression. Down from 19 last visit, 21 previously, and 25 at initial eval.  ISIDRO-7: 12; moderate anxiety. Down from 15 at last visit and 21 at initial eval.  MDQ: Negative likely Positive. Pt marking \"minor problem,\" and unknown if manic sx co-occurred, but 9 manic sx reported. (10/18/21)    Former Smoker  I advised Hyun of the risks of tobacco use.     Lab Results: Reviewed.  Total Cholesterol: 239 (11/3/21); 285  Triglycerides: 197 (11/3/21); 186 (9/22/21)  HDL: 45 (11/3/21); 40 (9/22/21)  LDL: 158 (11/3/21); 209 (9/22/21)  Sodium: 136 (9/22/21); 135 (8/29/21)    Assessment/Plan   Diagnoses and all orders for this visit:    1. Severe episode of recurrent major depressive disorder, without psychotic features (HCC) (Primary)  -     lamoTRIgine (LaMICtal) 100 MG tablet; Take 1 tablet by mouth Daily for 7 days, THEN 2 tablets Daily for 25 days.  Dispense: 57 tablet; Refill: 0    2. Generalized anxiety disorder with panic attacks  -     lamoTRIgine (LaMICtal) 100 MG tablet; Take 1 tablet by mouth Daily for 7 days, THEN 2 tablets Daily for 25 days.  Dispense: 57 tablet; Refill: 0    PHQ-9: 10; moderate depression. Down from 19 last visit, 21 previously, and 25 at initial eval.  ISIDRO-7: 12; moderate anxiety. Down from 15 at last visit and 21 at initial eval.  MDQ: Negative likely Positive. Pt marking \"minor problem,\" and unknown if manic sx co-occurred, but 9 manic sx reported. (10/18/21)  -Higher dose of Lamictal order to continue upward titration to 200mg after completion of 2 weeks at 50mg dose.  -Tolerated 84mg Spravato tx well, and will continue this dose twice weekly for improved depression control.  -Cont. 300mg Wellbutin daily due to pt believed minimal benefit. Consider moving off in the future. Refill requested and sent.  -Pt told to go to the ED or [facility] hospital if SI turn into plans or anticipated actions.  -Counseling continues to be encourage, says she is hoping to start at an atheist friendly therapist " she has contacted in January.  -In the future, continue to monitor cholesterol, sodium, and wt. Also, assess for PTSD with PCL-5 in the future.  -F/U in 6 days for next Spravato tx.    Visit Diagnoses:    ICD-10-CM ICD-9-CM   1. Severe episode of recurrent major depressive disorder, without psychotic features (HCC)  F33.2 296.33   2. Generalized anxiety disorder with panic attacks  F41.1 300.02    F41.0 300.01      TREATMENT PLAN/GOALS: In the short-term, the patient is to continue supportive psychotherapy efforts and medications as indicated. Treatment and medication options were discussed during today's visit. Long-term the goal will be to control symptoms so they do not negatively interfere with other aspects of the patient's life. Prognosis is optimistic with implementation of the current treatment plan. Patient ackowledged and verbally consented to the treatment plan and was educated on the importance of compliance with treatment and follow-up appointments.    MEDICATION ISSUES:  INSPECT reviewed 12/17/21, and is as expected. Spravato 84mg dose pack filled 12/6/21.  Discussed medication options and treatment plan of prescribed medication as well as the risks, benefits, and side effects including potential falls, possible impaired driving, and metabolic adversities among others. Patient counseled on a multimodal approach with healthy nutrition, healthy sleep, regular physical activity, social activity, counseling, and medication taking part in his/her psychiatric management. Patient is agreeable to the plan, and he/she agreed to call the office with any worsening of symptoms or onset of side effects. Patient is agreeable to call 911 or go to the nearest ER should he/she begin having SI/HI or self-harming behavior.     Assisted patient in processing above session content; acknowledged and normalized patient’s thoughts, feelings, and concerns. Reviewed positive coping skills and behavior management in session with  positive framing of thoughts. Allowed patient to freely discuss issues without interruption or judgment. Provided safe, confidential environment to facilitate the development of positive therapeutic relationship and encourage open and honest communication.    MEDS ORDERED DURING VISIT:  New Medications Ordered This Visit   Medications   • lamoTRIgine (LaMICtal) 100 MG tablet     Sig: Take 1 tablet by mouth Daily for 7 days, THEN 2 tablets Daily for 25 days.     Dispense:  57 tablet     Refill:  0     Return in about 6 days (around 12/23/2021) for Next scheduled follow up.    I, Jaleel Byers PA-C, was present in the office suite and immediately available to furnish assistance and direction throughout the entire observation time.     Patient tolerated the procedure well without complications.     Side effects of treatment were mild and included: slight dizziness.     After the observation time, the patient was assessed by me and considered stable to leave the office with assistance. Hyun Perez was advised not to drive or operate machinery until tomorrow following a full night's sleep.     This document has been electronically signed by Jaleel Byers PA-C  January 11, 2022 17:24 EST    [facility] Dragon transcription disclaimer:  Some of this encounter note is an electronic transcription translation of spoken language to printed text. The electronic translation of spoken language may permit erroneous, or at times, nonsensical words or phrases to be inadvertently transcribed; Although I have reviewed the note for such errors some may still exist.

## 2021-12-17 NOTE — PROGRESS NOTES
Spravato Monitoring Note    Start time of observation: 12:59pm  BP check prior to administration: 131/77    Hyun Perez   presented 12/17/2021  for clinical monitoring of self administration of Spravato. The patient used a total of 3 devices, self-administered intranasally, with a 5-minute rest between each device.    Each device was checked by klb for appropriate expiration and that 2 green indicator dots were present prior to patient administration. Afterwards, the device was checked by klb to ensure the green indicator dots were gone and the full medication was delivered.    Patient check at 1:41pm with BP reading of 148/91  Patient check at 3:01pm with BP reading of 137/80    End time of observation: 3:01pm    Patient monitored for 2 hours Patient tolerated the procedure well without complications..       NDC [id number]   LOT [id number]   EXP JUL 2023

## 2021-12-20 DIAGNOSIS — J30.2 SEASONAL ALLERGIES: ICD-10-CM

## 2021-12-20 RX ORDER — FLUTICASONE PROPIONATE 50 MCG
1 SPRAY, SUSPENSION (ML) NASAL DAILY PRN
Qty: 48 G | Refills: 1 | Status: SHIPPED | OUTPATIENT
Start: 2021-12-20 | End: 2022-05-18

## 2021-12-23 ENCOUNTER — OFFICE VISIT (OUTPATIENT)
Dept: PSYCHIATRY | Facility: CLINIC | Age: 41
End: 2021-12-23

## 2021-12-23 DIAGNOSIS — F41.1 GENERALIZED ANXIETY DISORDER WITH PANIC ATTACKS: Chronic | ICD-10-CM

## 2021-12-23 DIAGNOSIS — F41.0 GENERALIZED ANXIETY DISORDER WITH PANIC ATTACKS: Chronic | ICD-10-CM

## 2021-12-23 DIAGNOSIS — F33.2 SEVERE EPISODE OF RECURRENT MAJOR DEPRESSIVE DISORDER, WITHOUT PSYCHOTIC FEATURES (HCC): Primary | Chronic | ICD-10-CM

## 2021-12-23 PROCEDURE — 99415 PROLNG CLIN STAFF SVC 1ST HR: CPT | Performed by: PSYCHIATRY & NEUROLOGY

## 2021-12-23 PROCEDURE — 99215 OFFICE O/P EST HI 40 MIN: CPT | Performed by: PSYCHIATRY & NEUROLOGY

## 2021-12-23 NOTE — PROGRESS NOTES
Subjective   Hyun Perez is a 41 y.o. female who presents today for Spravato tx #7.    Chief Complaint:  Treatment Resistant Depression, spravato     History of Present Illness:  Prior to Spravato:  -Hx of tx resistent depression failing numerous medications over many years, and insomnia.  -Significant FmHx of successful SA in mother, grandmother and extended family. Her mother passed away early 2021.   -FmHx of bipolar d/o w/o diagnosis herself. Admits to 9 possibly manic symptoms on MDQ.  -On 300mg Wellbutrin daily for depression with little to no efficacy.  -Struggles with SI daily without a plan or anticipating actions.  -Vraylar caused minimal improvements and worsened irritability, also caused oversedating in the day.  -Been reluctant to counseling due to being an atheist and fearing discrimination and judgement.      Today the pt still reported feeling depressed, still rated as 8/10, but she noticed that now she has days when she does not have SI, more days when she is able to get distracted . She always feels more depressed this time of the year . The pt denied AVH/SI/HI, did not bring up any delusional content for the discussion, she enjoys time spending with her dogs         The following portions of the patient's history were reviewed and updated as appropriate: allergies, current medications, past family history, past medical history, past social history, past surgical history and problem list.    PAST PSYCHIATRIC HISTORY  Axis I  Affective/Bipolar Disorder, Anxiety/Panic Disorder, ADHD  Axis II  None    PAST OUTPATIENT TREATMENT  Diagnosis treated:  Affective Disorder, Anxiety/Panic Disorder, ADHD dx in childhood.  Treatment Type:  Medication Management  Prior Psychiatric Medications:  Bupropion - takes edge off a little bit.  prozac - inefficacy.  depakote - inefficacy.  seroquel - inefficacy.  zoloft - inefficacy.  paxil - inefficacy.  Duloxetine - inefficacy.  3mg Vraylar - oversedating and caused  sugar cravings.  Trazodone - ineffective at 50-100mg nightly.  *Others the patient cannot recall the names of.  Support Groups:  None  Sequelae Of Mental Disorder:  job disruption, social isolation, family disruption, financial hardships, emotional distress.    Interval History  No changes     Side Effects  Denies.    Past Medical History:  Past Medical History:   Diagnosis Date   • Anxiety      Social History:  Social History     Socioeconomic History   • Marital status: Single   Tobacco Use   • Smoking status: Former Smoker   • Smokeless tobacco: Never Used   Vaping Use   • Vaping Use: Never used   Substance and Sexual Activity   • Alcohol use: Yes     Comment: occ wine   • Drug use: Not Currently     Family History:  Family History   Problem Relation Age of Onset   • Hypertension Mother    • Coronary artery disease Mother         CABG x4 in late 30s   • Depression Mother         mother committed suicide   • Hypertension Maternal Grandmother    • Diabetes Maternal Grandmother    • Depression Maternal Grandmother         committed suicide   • Hypertension Maternal Grandfather    • Diabetes Maternal Grandfather    • Heart attack Maternal Grandfather    • Depression Maternal Cousin         committed suicide   • Depression Maternal Aunt         committed suicide     Past Surgical History:  Past Surgical History:   Procedure Laterality Date   • LEE  1997   • TUBAL ABDOMINAL LIGATION Bilateral 2015     Problem List:  Patient Active Problem List   Diagnosis   • Seasonal allergies   • Reduced libido   • Class 2 severe obesity due to excess calories with serious comorbidity and body mass index (BMI) of 38.0 to 38.9 in adult ([facility])   • Severe episode of recurrent major depressive disorder, without psychotic features ([facility])   • Essential hypertension   • Hot flashes due to menopause   • Generalized anxiety disorder with panic attacks   • Varicose veins of lower extremity   • Mixed hyperlipidemia   • Chest pain   • Epigastric  pain   • Diffuse arthralgia   • Other insomnia   • Bipolar II disorder (HCC)     Allergy:   Allergies   Allergen Reactions   • Banana Nausea Only   • Kiwi Extract Nausea Only   • Penicillin V Potassium Swelling   • Milk Thistle Other (See Comments)      Discontinued Medications:  There are no discontinued medications.   Current Medications:   Current Outpatient Medications   Medication Sig Dispense Refill   • allopurinol (Zyloprim) 100 MG tablet Take 1 tablet by mouth 2 (Two) Times a Day. 180 tablet 1   • atorvastatin (LIPITOR) 20 MG tablet Take 1 tablet by mouth Every Night. 90 tablet 1   • buPROPion XL (Wellbutrin XL) 300 MG 24 hr tablet Take 1 tablet by mouth Daily. 90 tablet 1   • celecoxib (CeleBREX) 200 MG capsule Take 1 capsule by mouth 2 (Two) Times a Day As Needed for Moderate Pain . Take with food! 60 capsule 5   • cetirizine (zyrTEC) 10 MG tablet Take 1 tablet by mouth Every Night. 90 tablet 1   • Esketamine HCl, 84 MG Dose, Nasal Solution 6 sprays into the nostril(s) as directed by provider 2 (Two) Times a Week. 2 each 3   • fluticasone (FLONASE) 50 MCG/ACT nasal spray 1 spray into the nostril(s) as directed by provider Daily As Needed for Rhinitis or Allergies. DO NOT SNIFF! 48 g 1   • lamoTRIgine (LaMICtal) 100 MG tablet Take 1 tablet by mouth Daily for 7 days, THEN 2 tablets Daily for 25 days. 57 tablet 0   • lisinopril (PRINIVIL,ZESTRIL) 10 MG tablet TAKE 1 TABLET BY MOUTH DAILY 90 tablet 1   • montelukast (SINGULAIR) 10 MG tablet      • omeprazole (priLOSEC) 40 MG capsule Take 40 mg by mouth Daily.     • traZODone (DESYREL) 100 MG tablet Take 1 tablet by mouth Every Night. 30 tablet 0     Current Facility-Administered Medications   Medication Dose Route Frequency Provider Last Rate Last Admin   • Esketamine HCl (84 MG Dose) Nasal Solution 84 mg  84 mg Nasal Once per day on Mon Thu Jaleel Byers PA-C   84 mg at 12/23/21 1245     Psychological ROS: positive for - anxiety, concentration  difficulties, depression, irritability, memory difficulties, mood swings, sleep disturbances and suicidal ideation. All improved, except for concentration and focus.  negative for - disorientation, hallucinations or hostility    Physical Exam:  BP:   Prior to tx: 133/77   40 min into tx: 139/82   after tx: 135/82    Mental Status Exam:   Orientation:  To person, Place, Time and Situation  Memory: Recent and remote memory Intact  Mood/Affect: Depressed, but improved.  Suicidal Ideations: None   Homicidal Ideations:  None  Hallucinations: None  Delusions:  None  Obsessions: None  Behavior and Psychomotor Activity: Appropriate  Speech:  Normal but minimal  Thought Process: Goal directed  Associations: Intact  Thought Content: mood congruent   Language: name objects and repeat phrases  Concentration and computation: Fair  Attention Span: Fair  Fund of Knowledge: Fair  Reliability: fair  Insight into mental health: Fair  Judgement: Fair  Impulse Control:  Fair  Hygiene: Good  Cooperation:  Cooperative  Eye Contact:  Fair  Physical/Medical Issues:  Yes HLD, obesity, HTN, GERD.     MSE from 12/17/2021 reviewed and accepted with changes from previous visit.    PHQ-9 Depression Screening:  Little interest or pleasure in doing things? 3   Feeling down, depressed, or hopeless? 3   Trouble falling or staying asleep, or sleeping too much? 2   Feeling tired or having little energy? 2   Poor appetite or overeating? 1   Feeling bad about yourself - or that you are a failure or have let yourself or your family down? 2   Trouble concentrating on things, such as reading the newspaper or watching television? 1   Moving or speaking so slowly that other people could have noticed? Or the opposite - being so fidgety or restless that you have been moving around a lot more than usual? 0   Thoughts that you would be better off dead, or of hurting yourself in some way? 0   PHQ-9 Total Score 14   If you checked off any problems, how difficult  have these problems made it for you to do your work, take care of things at home, or get along with other people? Extremely dIfficult   PHQ-9: 14; moderate depression.    Former Smoker  I advised Hyun of the risks of tobacco use.       Assessment/Plan   Diagnoses and all orders for this visit:    1. Severe episode of recurrent major depressive disorder, without psychotic features (HCC) (Primary)    2. Generalized anxiety disorder with panic attacks      Cont  Lamictal taper as planned   Cont   Spravato 84 mg 2 times per week , tolerates  Well  Cont. 300mg Wellbutin daily   Counseling continues to be encourage, says she is hoping to start at an atheist friendly therapist she has contacted in January.  -In the future, continue to monitor cholesterol, sodium, and wt.    Visit Diagnoses:    ICD-10-CM ICD-9-CM   1. Severe episode of recurrent major depressive disorder, without psychotic features (HCC)  F33.2 296.33   2. Generalized anxiety disorder with panic attacks  F41.1 300.02    F41.0 300.01      TREATMENT PLAN/GOALS: In the short-term, the patient is to continue supportive psychotherapy efforts and medications as indicated. Treatment and medication options were discussed during today's visit. Long-term the goal will be to control symptoms so they do not negatively interfere with other aspects of the patient's life. Prognosis is optimistic with implementation of the current treatment plan. Patient ackowledged and verbally consented to the treatment plan and was educated on the importance of compliance with treatment and follow-up appointments.    MEDICATION ISSUES:  INSPECT reviewed 12/17/21, and is as expected. Spravato 84mg dose pack filled 12/6/21.     MEDS ORDERED DURING VISIT:  No orders of the defined types were placed in this encounter.    No follow-ups on file.    I, Marina Schmidt MD , was present in the office suite and immediately available to furnish assistance and direction throughout the entire  observation time.     Patient tolerated the procedure well without complications, was in the office for treatment and observation for 2 hrs .     Side effects of treatment were mild and included: slight dizziness.     After the observation time, the patient was assessed by me and considered stable to leave the office with assistance. Hyun Perez was advised not to drive or operate machinery until tomorrow following a full night's sleep.     This document has been electronically signed by Marina Schmidt MD  December 23, 2021 14:41 EST    EMR Dragon transcription disclaimer:  Some of this encounter note is an electronic transcription translation of spoken language to printed text. The electronic translation of spoken language may permit erroneous, or at times, nonsensical words or phrases to be inadvertently transcribed; Although I have reviewed the note for such errors some may still exist.

## 2021-12-23 NOTE — PATIENT INSTRUCTIONS
"[email or web address]   Generalized Anxiety Disorder, Adult  Generalized anxiety disorder (ISIDRO) is a mental health condition. Unlike normal worries, anxiety related to ISIDRO is not triggered by a specific event. These worries do not fade or get better with time. ISIDRO interferes with relationships, work, and school.  ISIDRO symptoms can vary from mild to severe. People with severe ISIDRO can have intense waves of anxiety with physical symptoms that are similar to panic attacks.  What are the causes?  The exact cause of ISIDRO is not known, but the following are believed to have an impact:  · Differences in natural brain chemicals.  · Genes passed down from parents to children.  · Differences in the way threats are perceived.  · Development during childhood.  · Personality.  What increases the risk?  The following factors may make you more likely to develop this condition:  · Being female.  · Having a family history of anxiety disorders.  · Being very shy.  · Experiencing very stressful life events, such as the death of a loved one.  · Having a very stressful family environment.  What are the signs or symptoms?  People with ISIDRO often worry excessively about many things in their lives, such as their health and family. Symptoms may also include:  · Mental and emotional symptoms:  ? Worrying excessively about natural disasters.  ? Fear of being late.  ? Difficulty concentrating.  ? Fears that others are judging your performance.  · Physical symptoms:  ? Fatigue.  ? Headaches, muscle tension, muscle twitches, trembling, or feeling shaky.  ? Feeling like your heart is pounding or beating very fast.  ? Feeling out of breath or like you cannot take a deep breath.  ? Having trouble falling asleep or staying asleep, or experiencing restlessness.  ? Sweating.  ? Nausea, diarrhea, or irritable bowel syndrome (IBS).  · Behavioral symptoms:  ? Experiencing erratic moods or irritability.  ? Avoidance of new situations.  ? Avoidance of " people.  ? Extreme difficulty making decisions.  How is this diagnosed?  This condition is diagnosed based on your symptoms and medical history. You will also have a physical exam. Your health care provider may perform tests to rule out other possible causes of your symptoms.  To be diagnosed with ISIDRO, a person must have anxiety that:  · Is out of his or her control.  · Affects several different aspects of his or her life, such as work and relationships.  · Causes distress that makes him or her unable to take part in normal activities.  · Includes at least three symptoms of ISIDRO, such as restlessness, fatigue, trouble concentrating, irritability, muscle tension, or sleep problems.  Before your health care provider can confirm a diagnosis of ISIDRO, these symptoms must be present more days than they are not, and they must last for 6 months or longer.  How is this treated?  This condition may be treated with:  · Medicine. Antidepressant medicine is usually prescribed for long-term daily control. Anti-anxiety medicines may be added in severe cases, especially when panic attacks occur.  · Talk therapy (psychotherapy). Certain types of talk therapy can be helpful in treating ISIDRO by providing support, education, and guidance. Options include:  ? Cognitive behavioral therapy (CBT). People learn coping skills and self-calming techniques to ease their physical symptoms. They learn to identify unrealistic thoughts and behaviors and to replace them with more appropriate thoughts and behaviors.  ? Acceptance and commitment therapy (ACT). This treatment teaches people how to be mindful as a way to cope with unwanted thoughts and feelings.  ? Biofeedback. This process trains you to manage your body's response (physiological response) through breathing techniques and relaxation methods. You will work with a therapist while machines are used to monitor your physical symptoms.  · Stress management techniques. These include yoga,  meditation, and exercise.  A mental health specialist can help determine which treatment is best for you. Some people see improvement with one type of therapy. However, other people require a combination of therapies.  Follow these instructions at home:  Lifestyle  · Maintain a consistent routine and schedule.  · Anticipate stressful situations. Create a plan, and allow extra time to work with your plan.  · Practice stress management or self-calming techniques that you have learned from your therapist or your health care provider.  General instructions  · Take over-the-counter and prescription medicines only as told by your health care provider.  · Understand that you are likely to have setbacks. Accept this and be kind to yourself as you persist to take better care of yourself.  · Recognize and accept your accomplishments, even if you  them as small.  · Keep all follow-up visits as told by your health care provider. This is important.  Contact a health care provider if:  · Your symptoms do not get better.  · Your symptoms get worse.  · You have signs of depression, such as:  ? A persistently sad or irritable mood.  ? Loss of enjoyment in activities that used to bring you patricia.  ? Change in weight or eating.  ? Changes in sleeping habits.  ? Avoiding friends or family members.  ? Loss of energy for normal tasks.  ? Feelings of guilt or worthlessness.  Get help right away if:  · You have serious thoughts about hurting yourself or others.  If you ever feel like you may hurt yourself or others, or have thoughts about taking your own life, get help right away. Go to your nearest emergency department or:  · Call your local emergency services (931 in the [place]  · Call a suicide crisis helpline, such as the National Suicide Prevention Lifeline at 1-454.181.1149. This is open 24 hours a day in the [place]  · Text the Crisis Text Line at 785160 (in the [place]  Summary  · Generalized anxiety disorder (ISIDRO) is a mental  health condition that involves worry that is not triggered by a specific event.  · People with ISIDRO often worry excessively about many things in their lives, such as their health and family.  · ISIDRO may cause symptoms such as restlessness, trouble concentrating, sleep problems, frequent sweating, nausea, diarrhea, headaches, and trembling or muscle twitching.  · A mental health specialist can help determine which treatment is best for you. Some people see improvement with one type of therapy. However, other people require a combination of therapies.  This information is not intended to replace advice given to you by your health care provider. Make sure you discuss any questions you have with your health care provider.  Document Revised: 10/07/2020 Document Reviewed: 10/07/2020  Elsevier Patient Education © 2021 [employer]

## 2021-12-23 NOTE — PROGRESS NOTES
Spravato Monitoring Note    Start time of observation: 12:30 PM  BP check prior to administration: 131/77    Hyun Perez   presented 12/23/2021  for clinical monitoring of self administration of Spravato. The patient used a total of 3 devices, self-administered intranasally, with a 5-minute rest between each device.    Each device was checked by AFP for appropriate expiration and that 2 green indicator dots were present prior to patient administration. Afterwards, the device was checked by AFP to ensure the green indicator dots were gone and the full medication was delivered.    Patient check at 1:11 PM with BP reading of 139/82  Patient check at 2:30 PM with BP reading of 135/82    End time of observation: 2:35 PM    Patient monitored for 2 hours Patient tolerated the procedure well without complications..       NDC [id number]  LOT [id number]  EXP 0731/2023

## 2021-12-27 DIAGNOSIS — F31.81 BIPOLAR II DISORDER (HCC): Chronic | ICD-10-CM

## 2021-12-28 RX ORDER — LAMOTRIGINE 25 MG/1
TABLET ORAL
Qty: 46 TABLET | Refills: 0 | OUTPATIENT
Start: 2021-12-28

## 2021-12-30 ENCOUNTER — OFFICE VISIT (OUTPATIENT)
Dept: PSYCHIATRY | Facility: CLINIC | Age: 41
End: 2021-12-30

## 2021-12-30 DIAGNOSIS — F41.1 GENERALIZED ANXIETY DISORDER WITH PANIC ATTACKS: Chronic | ICD-10-CM

## 2021-12-30 DIAGNOSIS — F33.2 SEVERE EPISODE OF RECURRENT MAJOR DEPRESSIVE DISORDER, WITHOUT PSYCHOTIC FEATURES (HCC): Primary | Chronic | ICD-10-CM

## 2021-12-30 DIAGNOSIS — F41.0 GENERALIZED ANXIETY DISORDER WITH PANIC ATTACKS: Chronic | ICD-10-CM

## 2021-12-30 PROCEDURE — 99415 PROLNG CLIN STAFF SVC 1ST HR: CPT | Performed by: PSYCHIATRY & NEUROLOGY

## 2021-12-30 PROCEDURE — 99215 OFFICE O/P EST HI 40 MIN: CPT | Performed by: PSYCHIATRY & NEUROLOGY

## 2021-12-30 NOTE — PROGRESS NOTES
Spravato Monitoring Note    Start time of observation: 1:36 PM  BP check prior to administration: 117/78    Hyun Perez   presented 12/30/2021  for clinical monitoring of self administration of Spravato. The patient used a total of 3 devices, self-administered intranasally, with a 5-minute rest between each device.    Each device was checked by AFP for appropriate expiration and that 2 green indicator dots were present prior to patient administration. Afterwards, the device was checked by AFP to ensure the green indicator dots were gone and the full medication was delivered.    Patient check at 2:16 PM with BP reading of 135/82  Patient check at 3:36 PM with BP reading of 133/81    End time of observation: 3:45 PM    Patient monitored for 2 hours Patient tolerated the procedure well without complications..       NDC [id number]  LOT [id number]  EXP 07/31/2023

## 2021-12-30 NOTE — PATIENT INSTRUCTIONS
"[email or web address]   Generalized Anxiety Disorder, Adult  Generalized anxiety disorder (ISIDRO) is a mental health condition. Unlike normal worries, anxiety related to ISIDRO is not triggered by a specific event. These worries do not fade or get better with time. ISIDRO interferes with relationships, work, and school.  ISIDRO symptoms can vary from mild to severe. People with severe ISIDRO can have intense waves of anxiety with physical symptoms that are similar to panic attacks.  What are the causes?  The exact cause of ISIDRO is not known, but the following are believed to have an impact:  · Differences in natural brain chemicals.  · Genes passed down from parents to children.  · Differences in the way threats are perceived.  · Development during childhood.  · Personality.  What increases the risk?  The following factors may make you more likely to develop this condition:  · Being female.  · Having a family history of anxiety disorders.  · Being very shy.  · Experiencing very stressful life events, such as the death of a loved one.  · Having a very stressful family environment.  What are the signs or symptoms?  People with ISIDRO often worry excessively about many things in their lives, such as their health and family. Symptoms may also include:  · Mental and emotional symptoms:  ? Worrying excessively about natural disasters.  ? Fear of being late.  ? Difficulty concentrating.  ? Fears that others are judging your performance.  · Physical symptoms:  ? Fatigue.  ? Headaches, muscle tension, muscle twitches, trembling, or feeling shaky.  ? Feeling like your heart is pounding or beating very fast.  ? Feeling out of breath or like you cannot take a deep breath.  ? Having trouble falling asleep or staying asleep, or experiencing restlessness.  ? Sweating.  ? Nausea, diarrhea, or irritable bowel syndrome (IBS).  · Behavioral symptoms:  ? Experiencing erratic moods or irritability.  ? Avoidance of new situations.  ? Avoidance of " people.  ? Extreme difficulty making decisions.  How is this diagnosed?  This condition is diagnosed based on your symptoms and medical history. You will also have a physical exam. Your health care provider may perform tests to rule out other possible causes of your symptoms.  To be diagnosed with ISIDRO, a person must have anxiety that:  · Is out of his or her control.  · Affects several different aspects of his or her life, such as work and relationships.  · Causes distress that makes him or her unable to take part in normal activities.  · Includes at least three symptoms of ISIDRO, such as restlessness, fatigue, trouble concentrating, irritability, muscle tension, or sleep problems.  Before your health care provider can confirm a diagnosis of ISIDRO, these symptoms must be present more days than they are not, and they must last for 6 months or longer.  How is this treated?  This condition may be treated with:  · Medicine. Antidepressant medicine is usually prescribed for long-term daily control. Anti-anxiety medicines may be added in severe cases, especially when panic attacks occur.  · Talk therapy (psychotherapy). Certain types of talk therapy can be helpful in treating ISIDRO by providing support, education, and guidance. Options include:  ? Cognitive behavioral therapy (CBT). People learn coping skills and self-calming techniques to ease their physical symptoms. They learn to identify unrealistic thoughts and behaviors and to replace them with more appropriate thoughts and behaviors.  ? Acceptance and commitment therapy (ACT). This treatment teaches people how to be mindful as a way to cope with unwanted thoughts and feelings.  ? Biofeedback. This process trains you to manage your body's response (physiological response) through breathing techniques and relaxation methods. You will work with a therapist while machines are used to monitor your physical symptoms.  · Stress management techniques. These include yoga,  meditation, and exercise.  A mental health specialist can help determine which treatment is best for you. Some people see improvement with one type of therapy. However, other people require a combination of therapies.  Follow these instructions at home:  Lifestyle  · Maintain a consistent routine and schedule.  · Anticipate stressful situations. Create a plan, and allow extra time to work with your plan.  · Practice stress management or self-calming techniques that you have learned from your therapist or your health care provider.  General instructions  · Take over-the-counter and prescription medicines only as told by your health care provider.  · Understand that you are likely to have setbacks. Accept this and be kind to yourself as you persist to take better care of yourself.  · Recognize and accept your accomplishments, even if you  them as small.  · Keep all follow-up visits as told by your health care provider. This is important.  Contact a health care provider if:  · Your symptoms do not get better.  · Your symptoms get worse.  · You have signs of depression, such as:  ? A persistently sad or irritable mood.  ? Loss of enjoyment in activities that used to bring you patricia.  ? Change in weight or eating.  ? Changes in sleeping habits.  ? Avoiding friends or family members.  ? Loss of energy for normal tasks.  ? Feelings of guilt or worthlessness.  Get help right away if:  · You have serious thoughts about hurting yourself or others.  If you ever feel like you may hurt yourself or others, or have thoughts about taking your own life, get help right away. Go to your nearest emergency department or:  · Call your local emergency services (471 in the [place]  · Call a suicide crisis helpline, such as the National Suicide Prevention Lifeline at 1-637.341.5351. This is open 24 hours a day in the [place]  · Text the Crisis Text Line at 954215 (in the [place]  Summary  · Generalized anxiety disorder (ISIDRO) is a mental  health condition that involves worry that is not triggered by a specific event.  · People with ISIDRO often worry excessively about many things in their lives, such as their health and family.  · ISIDRO may cause symptoms such as restlessness, trouble concentrating, sleep problems, frequent sweating, nausea, diarrhea, headaches, and trembling or muscle twitching.  · A mental health specialist can help determine which treatment is best for you. Some people see improvement with one type of therapy. However, other people require a combination of therapies.  This information is not intended to replace advice given to you by your health care provider. Make sure you discuss any questions you have with your health care provider.  Document Revised: 10/07/2020 Document Reviewed: 10/07/2020  Elsevier Patient Education © 2021 [employer]

## 2021-12-30 NOTE — PROGRESS NOTES
Subjective   Hyun Perez is a 41 y.o. female who presents today for Spravato tx #8.    Chief Complaint:  Treatment Resistant Depression, spravato     History of Present Illness:  Prior to Spravato:  -Hx of tx resistent depression failing numerous medications over many years, and insomnia.  -Significant FmHx of successful SA in mother, grandmother and extended family. Her mother passed away early 2021.   -FmHx of bipolar d/o w/o diagnosis herself. Admits to 9 possibly manic symptoms on MDQ.  -On 300mg Wellbutrin daily for depression with little to no efficacy.  -Struggles with SI daily without a plan or anticipating actions.  -Vraylar caused minimal improvements and worsened irritability, also caused oversedating in the day.  -Been reluctant to counseling due to being an atheist and fearing discrimination and judgement.      Today the pt still reported feeling depressed, last session was 1 week ago, depression is  rated as 7-8/10, but she noticed that now she has days when she does not have SI, more days when she is able to get distracted .  The pt c/o decreased E level, poor motivations   She always feels more depressed this time of the year . The pt denied AVH/SI/HI, did not bring up any delusional content for the discussion, she enjoys time spending with her dogs         The following portions of the patient's history were reviewed and updated as appropriate: allergies, current medications, past family history, past medical history, past social history, past surgical history and problem list.    PAST PSYCHIATRIC HISTORY  Axis I  Affective/Bipolar Disorder, Anxiety/Panic Disorder, ADHD  Axis II  None    PAST OUTPATIENT TREATMENT  Diagnosis treated:  Affective Disorder, Anxiety/Panic Disorder, ADHD dx in childhood.  Treatment Type:  Medication Management  Prior Psychiatric Medications:  Bupropion - takes edge off a little bit.  prozac - inefficacy.  depakote - inefficacy.  seroquel - inefficacy.  zoloft -  inefficacy.  paxil - inefficacy.  Duloxetine - inefficacy.  3mg Vraylar - oversedating and caused sugar cravings.  Trazodone - ineffective at 50-100mg nightly.  *Others the patient cannot recall the names of.  Support Groups:  None  Sequelae Of Mental Disorder:  job disruption, social isolation, family disruption, financial hardships, emotional distress.    Interval History  No changes     Side Effects  Denies.    Past Medical History:  Past Medical History:   Diagnosis Date   • Anxiety      Social History:  Social History     Socioeconomic History   • Marital status: Single   Tobacco Use   • Smoking status: Former Smoker   • Smokeless tobacco: Never Used   Vaping Use   • Vaping Use: Never used   Substance and Sexual Activity   • Alcohol use: Yes     Comment: occ wine   • Drug use: Not Currently   • Sexual activity: Defer     Family History:  Family History   Problem Relation Age of Onset   • Hypertension Mother    • Coronary artery disease Mother         CABG x4 in late 30s   • Depression Mother         mother committed suicide   • Hypertension Maternal Grandmother    • Diabetes Maternal Grandmother    • Depression Maternal Grandmother         committed suicide   • Hypertension Maternal Grandfather    • Diabetes Maternal Grandfather    • Heart attack Maternal Grandfather    • Depression Maternal Cousin         committed suicide   • Depression Maternal Aunt         committed suicide     Past Surgical History:  Past Surgical History:   Procedure Laterality Date   • LEEP  1997   • TUBAL ABDOMINAL LIGATION Bilateral 2015     Problem List:  Patient Active Problem List   Diagnosis   • Seasonal allergies   • Reduced libido   • Class 2 severe obesity due to excess calories with serious comorbidity and body mass index (BMI) of 38.0 to 38.9 in adult ([facility])   • Severe episode of recurrent major depressive disorder, without psychotic features ([facility])   • Essential hypertension   • Hot flashes due to menopause   • Generalized  anxiety disorder with panic attacks   • Varicose veins of lower extremity   • Mixed hyperlipidemia   • Chest pain   • Epigastric pain   • Diffuse arthralgia   • Other insomnia   • Bipolar II disorder (HCC)     Allergy:   Allergies   Allergen Reactions   • Banana Nausea Only   • Kiwi Extract Nausea Only   • Penicillin V Potassium Swelling   • Milk Thistle Other (See Comments)      Discontinued Medications:  There are no discontinued medications.   Current Medications:   Current Outpatient Medications   Medication Sig Dispense Refill   • allopurinol (Zyloprim) 100 MG tablet Take 1 tablet by mouth 2 (Two) Times a Day. 180 tablet 1   • atorvastatin (LIPITOR) 20 MG tablet Take 1 tablet by mouth Every Night. 90 tablet 1   • buPROPion XL (Wellbutrin XL) 300 MG 24 hr tablet Take 1 tablet by mouth Daily. 90 tablet 1   • celecoxib (CeleBREX) 200 MG capsule Take 1 capsule by mouth 2 (Two) Times a Day As Needed for Moderate Pain . Take with food! 60 capsule 5   • cetirizine (zyrTEC) 10 MG tablet Take 1 tablet by mouth Every Night. 90 tablet 1   • Esketamine HCl, 84 MG Dose, Nasal Solution 6 sprays into the nostril(s) as directed by provider 2 (Two) Times a Week. 2 each 3   • fluticasone (FLONASE) 50 MCG/ACT nasal spray 1 spray into the nostril(s) as directed by provider Daily As Needed for Rhinitis or Allergies. DO NOT SNIFF! 48 g 1   • lamoTRIgine (LaMICtal) 100 MG tablet Take 1 tablet by mouth Daily for 7 days, THEN 2 tablets Daily for 25 days. 57 tablet 0   • lisinopril (PRINIVIL,ZESTRIL) 10 MG tablet TAKE 1 TABLET BY MOUTH DAILY 90 tablet 1   • montelukast (SINGULAIR) 10 MG tablet      • omeprazole (priLOSEC) 40 MG capsule Take 40 mg by mouth Daily.     • traZODone (DESYREL) 100 MG tablet Take 1 tablet by mouth Every Night. 30 tablet 0     Current Facility-Administered Medications   Medication Dose Route Frequency Provider Last Rate Last Admin   • Esketamine HCl (84 MG Dose) Nasal Solution 84 mg  84 mg Nasal Once per day on  Jaleel Ward PA-C   84 mg at 12/23/21 1245     Psychological ROS: positive for - anxiety, concentration difficulties, depression, irritability, memory difficulties, mood swings, sleep disturbances and suicidal ideation. All improved, except for concentration and focus.  negative for - disorientation, hallucinations or hostility    Physical Exam:  BP:   Prior to tx: 117/78   40 min into tx: 135/82   after tx:  133/81    Mental Status Exam:   Orientation:  To person, Place, Time and Situation  Memory: Recent and remote memory Intact  Mood/Affect: Depressed, but improved.  Suicidal Ideations: None   Homicidal Ideations:  None  Hallucinations: None  Delusions:  None  Obsessions: None  Behavior and Psychomotor Activity: Appropriate  Speech:  Normal but minimal  Thought Process: Goal directed  Associations: Intact  Thought Content: mood congruent   Language: name objects and repeat phrases  Concentration and computation: Fair  Attention Span: Fair  Fund of Knowledge: Fair  Reliability: fair  Insight into mental health: Fair  Judgement: Fair  Impulse Control:  Fair  Hygiene: Good  Cooperation:  Cooperative  Eye Contact:  Fair  Physical/Medical Issues:  Yes HLD, obesity, HTN, GERD.     MSE from 12/23/2021 reviewed and accepted with changes from previous visit.    PHQ-9 Depression Screening:  Little interest or pleasure in doing things? 2   Feeling down, depressed, or hopeless? 2   Trouble falling or staying asleep, or sleeping too much? 2   Feeling tired or having little energy? 3   Poor appetite or overeating? 1   Feeling bad about yourself - or that you are a failure or have let yourself or your family down? 1   Trouble concentrating on things, such as reading the newspaper or watching television? 1   Moving or speaking so slowly that other people could have noticed? Or the opposite - being so fidgety or restless that you have been moving around a lot more than usual? 0   Thoughts that you would be  better off dead, or of hurting yourself in some way? 0   PHQ-9 Total Score 12   If you checked off any problems, how difficult have these problems made it for you to do your work, take care of things at home, or get along with other people? Very difficult   PHQ-9: 12; moderate depression.    Former Smoker  I advised Hyun of the risks of tobacco use.       Assessment/Plan   Diagnoses and all orders for this visit:    1. Severe episode of recurrent major depressive disorder, without psychotic features (HCC) (Primary)    2. Generalized anxiety disorder with panic attacks      Cont  Lamictal taper as planned   Cont   Spravato 84 mg 2 times per week , tolerates  Well  Cont. 300mg Wellbutin daily   Counseling continues to be encourage, says she is hoping to start at an atheist friendly therapist she has contacted in January.  -In the future, continue to monitor cholesterol, sodium, and wt.    Visit Diagnoses:    ICD-10-CM ICD-9-CM   1. Severe episode of recurrent major depressive disorder, without psychotic features (HCC)  F33.2 296.33   2. Generalized anxiety disorder with panic attacks  F41.1 300.02    F41.0 300.01      TREATMENT PLAN/GOALS: In the short-term, the patient is to continue supportive psychotherapy efforts and medications as indicated. Treatment and medication options were discussed during today's visit. Long-term the goal will be to control symptoms so they do not negatively interfere with other aspects of the patient's life. Prognosis is optimistic with implementation of the current treatment plan. Patient ackowledged and verbally consented to the treatment plan and was educated on the importance of compliance with treatment and follow-up appointments.    MEDICATION ISSUES:  INSPECT reviewed 12/17/21, and is as expected. Spravato 84mg dose pack filled 12/6/21.     MEDS ORDERED DURING VISIT:  No orders of the defined types were placed in this encounter.    Return for Next scheduled follow up.    I,  Marina Schmidt MD , was present in the office suite and immediately available to furnish assistance and direction throughout the entire observation time.     Patient tolerated the procedure well without complications, was in the office for treatment and observation for 2 hrs .     Side effects of treatment were mild and included: slight dizziness.     After the observation time, the patient was assessed by me and considered stable to leave the office with assistance. Hyun Perez was advised not to drive or operate machinery until tomorrow following a full night's sleep.     This document has been electronically signed by Marina Schmidt MD  December 30, 2021 15:51 EST    EMR Dragon transcription disclaimer:  Some of this encounter note is an electronic transcription translation of spoken language to printed text. The electronic translation of spoken language may permit erroneous, or at times, nonsensical words or phrases to be inadvertently transcribed; Although I have reviewed the note for such errors some may still exist.

## 2022-01-07 ENCOUNTER — OFFICE VISIT (OUTPATIENT)
Dept: PSYCHIATRY | Facility: CLINIC | Age: 42
End: 2022-01-07

## 2022-01-07 DIAGNOSIS — F33.2 SEVERE EPISODE OF RECURRENT MAJOR DEPRESSIVE DISORDER, WITHOUT PSYCHOTIC FEATURES: Primary | Chronic | ICD-10-CM

## 2022-01-07 DIAGNOSIS — F41.0 GENERALIZED ANXIETY DISORDER WITH PANIC ATTACKS: Chronic | ICD-10-CM

## 2022-01-07 DIAGNOSIS — F41.1 GENERALIZED ANXIETY DISORDER WITH PANIC ATTACKS: Chronic | ICD-10-CM

## 2022-01-07 PROCEDURE — 99215 OFFICE O/P EST HI 40 MIN: CPT | Performed by: PSYCHIATRY & NEUROLOGY

## 2022-01-07 PROCEDURE — 99415 PROLNG CLIN STAFF SVC 1ST HR: CPT | Performed by: PSYCHIATRY & NEUROLOGY

## 2022-01-07 NOTE — PATIENT INSTRUCTIONS
"http://APA.org/depression-guideline\"> https://clinicalkey.com\"> http://point-of-care.[employer].Like.fm/skills/\"> http://point-of-care.[employer].com\">   Managing Depression, Adult  Depression is a mental health condition that affects your thoughts, feelings, and actions. Being diagnosed with depression can bring you relief if you did not know why you have felt or behaved a certain way. It could also leave you feeling overwhelmed with uncertainty about your future. Preparing yourself to manage your symptoms can help you feel more positive about your future.  How to manage lifestyle changes  Managing stress    Stress is your body's reaction to life changes and events, both good and bad. Stress can add to your feelings of depression. Learning to manage your stress can help lessen your feelings of depression.  Try some of the following approaches to reducing your stress (stress reduction techniques):  · Listen to music that you enjoy and that inspires you.  · Try using a meditation julian or take a meditation class.  · Develop a practice that helps you connect with your spiritual self. Walk in nature, pray, or go to a place of [place].  · Do some deep breathing. To do this, inhale slowly through your nose. Pause at the top of your inhale for a few seconds and then exhale slowly, letting your muscles relax.  · Practice yoga to help relax and work your muscles.  Choose a stress reduction technique that suits your lifestyle and personality. These techniques take time and practice to develop. Set aside 5-15 minutes a day to do them. Therapists can offer training in these techniques. Other things you can do to manage stress include:  · Keeping a stress diary.  · Knowing your limits and saying no when you think something is too much.  · Paying attention to how you react to certain situations. You may not be able to control everything, but you can change your reaction.  · Adding humor to your life by " watching funny films or TV shows.  · Making time for activities that you enjoy and that relax you.    Medicines  Medicines, such as antidepressants, are often a part of treatment for depression.  · Talk with your pharmacist or health care provider about all the medicines, supplements, and herbal products that you take, their possible side effects, and what medicines and other products are safe to take together.  · Make sure to report any side effects you may have to your health care provider.  Relationships  Your health care provider may suggest family therapy, couples therapy, or individual therapy as part of your treatment.  How to recognize changes  Everyone responds differently to treatment for depression. As you recover from depression, you may start to:  · Have more interest in doing activities.  · Feel less hopeless.  · Have more energy.  · Overeat less often, or have a better appetite.  · Have better mental focus.  It is important to recognize if your depression is not getting better or is getting worse. The symptoms you had in the beginning may return, such as:  · Tiredness (fatigue) or low energy.  · Eating too much or too little.  · Sleeping too much or too little.  · Feeling restless, agitated, or hopeless.  · Trouble focusing or making decisions.  · Unexplained physical complaints.  · Feeling irritable, angry, or aggressive.  If you or your family members notice these symptoms coming back, let your health care provider know right away.  Follow these instructions at home:  Activity    · Try to get some form of exercise each day, such as walking, biking, swimming, or lifting weights.  · Practice stress reduction techniques.  · Engage your mind by taking a class or doing some volunteer work.    Lifestyle  · Get the right amount and quality of sleep.  · Cut down on using caffeine, tobacco, alcohol, and other potentially harmful substances.  · Eat a healthy diet that includes plenty of vegetables, fruits,  whole grains, low-fat dairy products, and lean protein. Do not eat a lot of foods that are high in solid fats, added sugars, or salt (sodium).  General instructions  · Take over-the-counter and prescription medicines only as told by your health care provider.  · Keep all follow-up visits as told by your health care provider. This is important.  Where to find support  Talking to others    Friends and family members can be sources of support and guidance. Talk to trusted friends or family members about your condition. Explain your symptoms to them, and let them know that you are working with a health care provider to treat your depression. Tell friends and family members how they also can be helpful.  Finances  · Find appropriate mental health providers that fit with your financial situation.  · Talk with your health care provider about options to get reduced prices on your medicines.  Where to find more information  You can find support in your area from:  · Anxiety and Depression Association of [place] (ADAA): www.adaa.org  · Mental Health [place]: www.mentalhealth[place].net  · National [place] on Mental Illness: www.symone.org  Contact a health care provider if:  · You stop taking your antidepressant medicines, and you have any of these symptoms:  ? Nausea.  ? Headache.  ? Light-headedness.  ? Chills and body aches.  ? Not being able to sleep (insomnia).  · You or your friends and family think your depression is getting worse.  Get help right away if:  · You have thoughts of hurting yourself or others.  If you ever feel like you may hurt yourself or others, or have thoughts about taking your own life, get help right away. Go to your nearest emergency department or:  · Call your local emergency services (239 in the [place]  · Call a suicide crisis helpline, such as the National Suicide Prevention Lifeline at 1-721.862.3307. This is open 24 hours a day in the [place]  · Text the Crisis Text Line at 817344 (in the  [place]  Summary  · If you are diagnosed with depression, preparing yourself to manage your symptoms is a good way to feel positive about your future.  · Work with your health care provider on a management plan that includes stress reduction techniques, medicines (if applicable), therapy, and healthy lifestyle habits.  · Keep talking with your health care provider about how your treatment is working.  · If you have thoughts about taking your own life, call a suicide crisis helpline or text a crisis text line.  This information is not intended to replace advice given to you by your health care provider. Make sure you discuss any questions you have with your health care provider.  Document Revised: 10/28/2020 Document Reviewed: 10/28/2020  Elsevier Patient Education © 2021 [employer]

## 2022-01-07 NOTE — PROGRESS NOTES
Spravato Monitoring Note    Start time of observation: 12:12pm  BP check prior to administration: 146/88    Hyun Perez   presented 01/07/2022  for clinical monitoring of self administration of Spravato. The patient used a total of 3 devices, self-administered intranasally, with a 5-minute rest between each device.    Each device was checked by klb for appropriate expiration and that 2 green indicator dots were present prior to patient administration. Afterwards, the device was checked by klb to ensure the green indicator dots were gone and the full medication was delivered.    Patient check at 12:54pm with BP reading of 154/82  Patient check at 2:14pm with BP reading of 145/89    End time of observation: 2:14pm    Patient monitored for 2 hours Patient tolerated the procedure well without complications..       NDC [id number]  LOT [id number]  EXP 2023 Jul

## 2022-01-07 NOTE — PROGRESS NOTES
Subjective   Hyun Perez is a 42 y.o. female who presents today for Spravato tx #9.    Chief Complaint:  Treatment Resistant Depression, spravato     History of Present Illness:  Prior to Spravato:  -Hx of tx resistent depression failing numerous medications over many years, and insomnia.  -Significant FmHx of successful SA in mother, grandmother and extended family. Her mother passed away early 2021.   -FmHx of bipolar d/o w/o diagnosis herself. Admits to 9 possibly manic symptoms on MDQ.  -On 300mg Wellbutrin daily for depression with little to no efficacy.  -Struggles with SI daily without a plan or anticipating actions.  -Vraylar caused minimal improvements and worsened irritability, also caused oversedating in the day.  -Been reluctant to counseling due to being an atheist and fearing discrimination and judgement.      Today the pt still reported feeling depressed, last session was 1 week ago, depression is  rated as 7-8/10, but she noticed that now she has days when she does not have SI, more days when she is able to get distracted .  The pt c/o decreased E level, poor motivations   She always feels more depressed this time of the year . The pt denied AVH/SI/HI, did not bring up any delusional content for the discussion, she enjoys time spending with her dogs         The following portions of the patient's history were reviewed and updated as appropriate: allergies, current medications, past family history, past medical history, past social history, past surgical history and problem list.    PAST PSYCHIATRIC HISTORY  Axis I  Affective/Bipolar Disorder, Anxiety/Panic Disorder, ADHD  Axis II  None    PAST OUTPATIENT TREATMENT  Diagnosis treated:  Affective Disorder, Anxiety/Panic Disorder, ADHD dx in childhood.  Treatment Type:  Medication Management  Prior Psychiatric Medications:  Bupropion - takes edge off a little bit.  prozac - inefficacy.  depakote - inefficacy.  seroquel - inefficacy.  zoloft -  inefficacy.  paxil - inefficacy.  Duloxetine - inefficacy.  3mg Vraylar - oversedating and caused sugar cravings.  Trazodone - ineffective at 50-100mg nightly.  *Others the patient cannot recall the names of.  Support Groups:  None  Sequelae Of Mental Disorder:  job disruption, social isolation, family disruption, financial hardships, emotional distress.    Interval History  No changes     Side Effects  Denies.    Past Medical History:  Past Medical History:   Diagnosis Date   • Anxiety      Social History:  Social History     Socioeconomic History   • Marital status: Single   Tobacco Use   • Smoking status: Former Smoker   • Smokeless tobacco: Never Used   Vaping Use   • Vaping Use: Never used   Substance and Sexual Activity   • Alcohol use: Yes     Comment: occ wine   • Drug use: Not Currently   • Sexual activity: Defer     Family History:  Family History   Problem Relation Age of Onset   • Hypertension Mother    • Coronary artery disease Mother         CABG x4 in late 30s   • Depression Mother         mother committed suicide   • Hypertension Maternal Grandmother    • Diabetes Maternal Grandmother    • Depression Maternal Grandmother         committed suicide   • Hypertension Maternal Grandfather    • Diabetes Maternal Grandfather    • Heart attack Maternal Grandfather    • Depression Maternal Cousin         committed suicide   • Depression Maternal Aunt         committed suicide     Past Surgical History:  Past Surgical History:   Procedure Laterality Date   • LEEP  1997   • TUBAL ABDOMINAL LIGATION Bilateral 2015     Problem List:  Patient Active Problem List   Diagnosis   • Seasonal allergies   • Reduced libido   • Class 2 severe obesity due to excess calories with serious comorbidity and body mass index (BMI) of 38.0 to 38.9 in adult ([facility])   • Severe episode of recurrent major depressive disorder, without psychotic features ([facility])   • Essential hypertension   • Hot flashes due to menopause   • Generalized  anxiety disorder with panic attacks   • Varicose veins of lower extremity   • Mixed hyperlipidemia   • Chest pain   • Epigastric pain   • Diffuse arthralgia   • Other insomnia   • Bipolar II disorder (HCC)     Allergy:   Allergies   Allergen Reactions   • Banana Nausea Only   • Kiwi Extract Nausea Only   • Penicillin V Potassium Swelling   • Milk Thistle Other (See Comments)      Discontinued Medications:  There are no discontinued medications.   Current Medications:   Current Outpatient Medications   Medication Sig Dispense Refill   • allopurinol (Zyloprim) 100 MG tablet Take 1 tablet by mouth 2 (Two) Times a Day. 180 tablet 1   • atorvastatin (LIPITOR) 20 MG tablet Take 1 tablet by mouth Every Night. 90 tablet 1   • buPROPion XL (Wellbutrin XL) 300 MG 24 hr tablet Take 1 tablet by mouth Daily. 90 tablet 1   • celecoxib (CeleBREX) 200 MG capsule Take 1 capsule by mouth 2 (Two) Times a Day As Needed for Moderate Pain . Take with food! 60 capsule 5   • cetirizine (zyrTEC) 10 MG tablet Take 1 tablet by mouth Every Night. 90 tablet 1   • Esketamine HCl, 84 MG Dose, Nasal Solution 6 sprays into the nostril(s) as directed by provider 2 (Two) Times a Week. 2 each 3   • fluticasone (FLONASE) 50 MCG/ACT nasal spray 1 spray into the nostril(s) as directed by provider Daily As Needed for Rhinitis or Allergies. DO NOT SNIFF! 48 g 1   • lamoTRIgine (LaMICtal) 100 MG tablet Take 1 tablet by mouth Daily for 7 days, THEN 2 tablets Daily for 25 days. 57 tablet 0   • lisinopril (PRINIVIL,ZESTRIL) 10 MG tablet TAKE 1 TABLET BY MOUTH DAILY 90 tablet 1   • montelukast (SINGULAIR) 10 MG tablet      • omeprazole (priLOSEC) 40 MG capsule Take 40 mg by mouth Daily.     • traZODone (DESYREL) 100 MG tablet Take 1 tablet by mouth Every Night. 30 tablet 0     Current Facility-Administered Medications   Medication Dose Route Frequency Provider Last Rate Last Admin   • Esketamine HCl (84 MG Dose) Nasal Solution 84 mg  84 mg Nasal Once per day on  Jaleel Ward PA-C   84 mg at 12/30/21 9547     Psychological ROS: positive for - anxiety, concentration difficulties, depression, irritability, memory difficulties, mood swings, sleep disturbances and suicidal ideation. All improved, except for concentration and focus.  negative for - disorientation, hallucinations or hostility    Physical Exam:  BP:   Prior to tx: 146/88   40 min into tx: 154/82   after tx:  145/89 (the pt took her BP meds late today)     Mental Status Exam:   Orientation:  To person, Place, Time and Situation  Memory: Recent and remote memory Intact  Mood/Affect: Depressed, but improved.  Suicidal Ideations: None   Homicidal Ideations:  None  Hallucinations: None  Delusions:  None  Obsessions: None  Behavior and Psychomotor Activity: Appropriate  Speech:  Normal but minimal  Thought Process: Goal directed  Associations: Intact  Thought Content: mood congruent   Language: name objects and repeat phrases  Concentration and computation: Fair  Attention Span: Fair  Fund of Knowledge: Fair  Reliability: fair  Insight into mental health: Fair  Judgement: Fair  Impulse Control:  Fair  Hygiene: Good  Cooperation:  Cooperative  Eye Contact:  Fair  Physical/Medical Issues:  Yes HLD, obesity, HTN, GERD.     MSE from 12/30/2021 reviewed and accepted with changes from previous visit.    PHQ-9 Depression Screening:  Little interest or pleasure in doing things? 2   Feeling down, depressed, or hopeless? 2   Trouble falling or staying asleep, or sleeping too much? 1   Feeling tired or having little energy? 1   Poor appetite or overeating? 0   Feeling bad about yourself - or that you are a failure or have let yourself or your family down? 2   Trouble concentrating on things, such as reading the newspaper or watching television? 1   Moving or speaking so slowly that other people could have noticed? Or the opposite - being so fidgety or restless that you have been moving around a lot more than  usual? 0   Thoughts that you would be better off dead, or of hurting yourself in some way? 0   PHQ-9 Total Score 9   If you checked off any problems, how difficult have these problems made it for you to do your work, take care of things at home, or get along with other people? Very difficult   PHQ-9: 12; moderate depression.    Former Smoker  I advised Hyun of the risks of tobacco use.       Assessment/Plan   Diagnoses and all orders for this visit:    1. Severe episode of recurrent major depressive disorder, without psychotic features (HCC) (Primary)    2. Generalized anxiety disorder with panic attacks      Cont  Lamictal taper as planned   Cont   Spravato 84 mg 2 times per week , tolerates  Well  Cont. 300mg Wellbutin daily   Counseling continues to be encourage, says she is hoping to start at an atheist friendly therapist she has contacted in January.  -In the future, continue to monitor cholesterol, sodium, and wt.    Visit Diagnoses:    ICD-10-CM ICD-9-CM   1. Severe episode of recurrent major depressive disorder, without psychotic features (HCC)  F33.2 296.33   2. Generalized anxiety disorder with panic attacks  F41.1 300.02    F41.0 300.01      TREATMENT PLAN/GOALS: In the short-term, the patient is to continue supportive psychotherapy efforts and medications as indicated. Treatment and medication options were discussed during today's visit. Long-term the goal will be to control symptoms so they do not negatively interfere with other aspects of the patient's life. Prognosis is optimistic with implementation of the current treatment plan. Patient ackowledged and verbally consented to the treatment plan and was educated on the importance of compliance with treatment and follow-up appointments.    MEDICATION ISSUES:  INSPECT reviewed 12/17/21, and is as expected. Spravato 84mg dose pack filled 12/6/21.     MEDS ORDERED DURING VISIT:  No orders of the defined types were placed in this encounter.    Return for  Next scheduled follow up.    I, Marina Schmidt MD , was present in the office suite and immediately available to furnish assistance and direction throughout the entire observation time.     Patient tolerated the procedure well without complications, was in the office for treatment and observation for 2 hrs .     Side effects of treatment were mild and included: slight dizziness.     After the observation time, the patient was assessed by me and considered stable to leave the office with assistance. Hyun Perez was advised not to drive or operate machinery until tomorrow following a full night's sleep.     This document has been electronically signed by Marina Schmidt MD  January 7, 2022 14:14 EST    EMR Dragon transcription disclaimer:  Some of this encounter note is an electronic transcription translation of spoken language to printed text. The electronic translation of spoken language may permit erroneous, or at times, nonsensical words or phrases to be inadvertently transcribed; Although I have reviewed the note for such errors some may still exist.

## 2022-01-10 ENCOUNTER — OFFICE VISIT (OUTPATIENT)
Dept: PSYCHIATRY | Facility: CLINIC | Age: 42
End: 2022-01-10

## 2022-01-10 DIAGNOSIS — F41.0 GENERALIZED ANXIETY DISORDER WITH PANIC ATTACKS: Chronic | ICD-10-CM

## 2022-01-10 DIAGNOSIS — F41.1 GENERALIZED ANXIETY DISORDER WITH PANIC ATTACKS: Chronic | ICD-10-CM

## 2022-01-10 DIAGNOSIS — F33.2 SEVERE EPISODE OF RECURRENT MAJOR DEPRESSIVE DISORDER, WITHOUT PSYCHOTIC FEATURES: Primary | Chronic | ICD-10-CM

## 2022-01-10 PROCEDURE — 99415 PROLNG CLIN STAFF SVC 1ST HR: CPT | Performed by: PHYSICIAN ASSISTANT

## 2022-01-10 PROCEDURE — 99215 OFFICE O/P EST HI 40 MIN: CPT | Performed by: PHYSICIAN ASSISTANT

## 2022-01-10 NOTE — PROGRESS NOTES
Subjective   Hyun Perez is a 42 y.o. female who presents today for Spravato treatment #10    Chief Complaint:  Treatment Resistant Depression, spravato     History of Present Illness:  Prior to Spravato:  -Hx of tx resistent depression failing numerous medications over many years, and insomnia.  -Significant FmHx of successful SA in mother, grandmother and extended family. Her mother passed away early 2021.   -FmHx of bipolar d/o w/o diagnosis herself. Admits to 9 possibly manic symptoms on MDQ.  -On 300mg Wellbutrin daily for depression with little to no efficacy.  -Struggles with SI daily without a plan or anticipating actions.  -Vraylar caused minimal improvements and worsened irritability, also caused oversedating in the day.  -Been reluctant to counseling due to being an atheist and fearing discrimination and judgement.      Today the pt still reported feeling depressed, last session was 3 days ago, depression is  rated as 7-8/10, but she noticed that now she has days when she does not have SI, more days when she is able to get distracted .  The pt c/o decreased E level, poor motivations   She always feels more depressed this time of the year . The pt denied AVH/SI/HI, did not bring up any delusional content for the discussion, she enjoys time spending with her dogs         The following portions of the patient's history were reviewed and updated as appropriate: allergies, current medications, past family history, past medical history, past social history, past surgical history and problem list.    PAST PSYCHIATRIC HISTORY  Axis I  Affective/Bipolar Disorder, Anxiety/Panic Disorder, ADHD  Axis II  None    PAST OUTPATIENT TREATMENT  Diagnosis treated:  Affective Disorder, Anxiety/Panic Disorder, ADHD dx in childhood.  Treatment Type:  Medication Management  Prior Psychiatric Medications:  Bupropion - takes edge off a little bit.  prozac - inefficacy.  depakote - inefficacy.  seroquel - inefficacy.  zoloft -  inefficacy.  paxil - inefficacy.  Duloxetine - inefficacy.  3mg Vraylar - oversedating and caused sugar cravings.  Trazodone - ineffective at 50-100mg nightly.  *Others the patient cannot recall the names of.  Lamictal  Spravato  Support Groups:  None  Sequelae Of Mental Disorder:  job disruption, social isolation, family disruption, financial hardships, emotional distress.    Interval History  No changes     Side Effects  Denies.    Past psychiatric history was reviewed and compared to 1/7/2022 visit and appropriate updates were made.    Past Medical History:  Past Medical History:   Diagnosis Date   • Anxiety      Social History:  Social History     Socioeconomic History   • Marital status: Single   Tobacco Use   • Smoking status: Former Smoker   • Smokeless tobacco: Never Used   Vaping Use   • Vaping Use: Never used   Substance and Sexual Activity   • Alcohol use: Yes     Comment: occ wine   • Drug use: Not Currently   • Sexual activity: Defer     Family History:  Family History   Problem Relation Age of Onset   • Hypertension Mother    • Coronary artery disease Mother         CABG x4 in late 30s   • Depression Mother         mother committed suicide   • Hypertension Maternal Grandmother    • Diabetes Maternal Grandmother    • Depression Maternal Grandmother         committed suicide   • Hypertension Maternal Grandfather    • Diabetes Maternal Grandfather    • Heart attack Maternal Grandfather    • Depression Maternal Cousin         committed suicide   • Depression Maternal Aunt         committed suicide     Past Surgical History:  Past Surgical History:   Procedure Laterality Date   • LEEDEJAN  1997   • TUBAL ABDOMINAL LIGATION Bilateral 2015     Problem List:  Patient Active Problem List   Diagnosis   • Seasonal allergies   • Reduced libido   • Class 2 severe obesity due to excess calories with serious comorbidity and body mass index (BMI) of 38.0 to 38.9 in adult (HCC)   • Severe episode of recurrent major  depressive disorder, without psychotic features (HCC)   • Essential hypertension   • Hot flashes due to menopause   • Generalized anxiety disorder with panic attacks   • Varicose veins of lower extremity   • Mixed hyperlipidemia   • Chest pain   • Epigastric pain   • Diffuse arthralgia   • Other insomnia   • Bipolar II disorder (HCC)     Allergy:   Allergies   Allergen Reactions   • Banana Nausea Only   • Kiwi Extract Nausea Only   • Penicillin V Potassium Swelling   • Milk Thistle Other (See Comments)      Discontinued Medications:  There are no discontinued medications.   Current Medications:   Current Outpatient Medications   Medication Sig Dispense Refill   • allopurinol (Zyloprim) 100 MG tablet Take 1 tablet by mouth 2 (Two) Times a Day. 180 tablet 1   • atorvastatin (LIPITOR) 20 MG tablet Take 1 tablet by mouth Every Night. 90 tablet 1   • buPROPion XL (Wellbutrin XL) 300 MG 24 hr tablet Take 1 tablet by mouth Daily. 90 tablet 1   • celecoxib (CeleBREX) 200 MG capsule Take 1 capsule by mouth 2 (Two) Times a Day As Needed for Moderate Pain . Take with food! 60 capsule 5   • cetirizine (zyrTEC) 10 MG tablet Take 1 tablet by mouth Every Night. 90 tablet 1   • Esketamine HCl, 84 MG Dose, Nasal Solution 6 sprays into the nostril(s) as directed by provider 2 (Two) Times a Week. 2 each 3   • fluticasone (FLONASE) 50 MCG/ACT nasal spray 1 spray into the nostril(s) as directed by provider Daily As Needed for Rhinitis or Allergies. DO NOT SNIFF! 48 g 1   • lamoTRIgine (LaMICtal) 100 MG tablet Take 1 tablet by mouth Daily for 7 days, THEN 2 tablets Daily for 25 days. 57 tablet 0   • lisinopril (PRINIVIL,ZESTRIL) 10 MG tablet TAKE 1 TABLET BY MOUTH DAILY 90 tablet 1   • montelukast (SINGULAIR) 10 MG tablet      • omeprazole (priLOSEC) 40 MG capsule Take 40 mg by mouth Daily.     • traZODone (DESYREL) 100 MG tablet TAKE 1 TABLET BY MOUTH EVERY NIGHT 30 tablet 0     Current Facility-Administered Medications   Medication  Dose Route Frequency Provider Last Rate Last Admin   • Esketamine HCl (84 MG Dose) Nasal Solution 84 mg  84 mg Nasal Once per day on Mon Thu Jaleel Byers PA-C   84 mg at 01/10/22 1226     Psychological ROS: positive for - anxiety, concentration difficulties, depression, irritability, memory difficulties, mood swings, sleep disturbances and suicidal ideation. All improved, except for concentration and focus.  negative for - disorientation, hallucinations or hostility    Physical Exam:  BP Prior to tx: 144/88  BP 40 min into tx: 139/84  BP prior to discharge 124/79    Mental Status Exam:   Orientation:  To person, Place, Time and Situation  Memory: Recent and remote memory Intact  Mood/Affect: Depressed  Suicidal Ideations: None   Homicidal Ideations:  None  Hallucinations: None  Delusions:  None  Obsessions: None  Behavior and Psychomotor Activity: Appropriate  Speech:  Normal but minimal  Thought Process: Goal directed  Associations: Intact  Thought Content: mood congruent   Language: name objects and repeat phrases  Concentration and computation: Fair  Attention Span: Fair  Fund of Knowledge: Fair  Reliability: fair  Insight into mental health: Fair  Judgement: Fair  Impulse Control:  Fair  Hygiene: Good  Cooperation:  Cooperative  Eye Contact:  Fair  Physical/Medical Issues:  Yes HLD, obesity, HTN, GERD.     MSE from 1/7/22 was reviewed and appropriate updates were made.     PHQ-9 Depression Screening:  Little interest or pleasure in doing things? 2   Feeling down, depressed, or hopeless? 3   Trouble falling or staying asleep, or sleeping too much? 2   Feeling tired or having little energy? 2   Poor appetite or overeating? 0   Feeling bad about yourself - or that you are a failure or have let yourself or your family down? 2   Trouble concentrating on things, such as reading the newspaper or watching television? 2   Moving or speaking so slowly that other people could have noticed? Or the opposite -  being so fidgety or restless that you have been moving around a lot more than usual? 0   Thoughts that you would be better off dead, or of hurting yourself in some way? 1   PHQ-9 Total Score 14   If you checked off any problems, how difficult have these problems made it for you to do your work, take care of things at home, or get along with other people? Very difficult       Former Smoker  I advised Hyun of the risks of tobacco use.       Assessment/Plan   Diagnoses and all orders for this visit:    1. Severe episode of recurrent major depressive disorder, without psychotic features (HCC) (Primary)    2. Generalized anxiety disorder with panic attacks      Cont  Lamictal taper as planned   Cont   Spravato 84 mg 2 times per week , tolerates  Well  Cont. 300mg Wellbutin daily   Counseling continues to be encourage, says she is hoping to start at an atheist friendly therapist she has contacted in January.  -In the future, continue to monitor cholesterol, sodium, and wt.    Visit Diagnoses:    ICD-10-CM ICD-9-CM   1. Severe episode of recurrent major depressive disorder, without psychotic features (HCC)  F33.2 296.33   2. Generalized anxiety disorder with panic attacks  F41.1 300.02    F41.0 300.01      TREATMENT PLAN/GOALS: In the short-term, the patient is to continue supportive psychotherapy efforts and medications as indicated. Treatment and medication options were discussed during today's visit. Long-term the goal will be to control symptoms so they do not negatively interfere with other aspects of the patient's life. Prognosis is optimistic with implementation of the current treatment plan. Patient ackowledged and verbally consented to the treatment plan and was educated on the importance of compliance with treatment and follow-up appointments.    MEDICATION ISSUES:  INSPECT reviewed 12/17/21, and is as expected. Spravato 84mg dose pack filled 12/6/21.     MEDS ORDERED DURING VISIT:  No orders of the defined  types were placed in this encounter.    Return in about 2 days (around 1/12/2022).     I, Mckenzie Guzman PA-C, was present in the office suite and immediately available to furnish assistance and direction throughout the entire observation time.     Patient tolerated the procedure well without complications..     Side effects of treatment were mild and included sedation, dizziness, mild euphoria.    After the observation time, the patient was assessed by me and considered stable to leave the office with assistance. Hyun Perez  was advised not to drive or operate machinery until tomorrow following a full night's sleep.    Mckenzie Guzman PA-C  01/12/22   12:26 EST          This document has been electronically signed by Mckenzie Guzman PA-C  January 12, 2022 17:14 EST    [facility] Dragon transcription disclaimer:  Some of this encounter note is an electronic transcription translation of spoken language to printed text. The electronic translation of spoken language may permit erroneous, or at times, nonsensical words or phrases to be inadvertently transcribed; Although I have reviewed the note for such errors some may still exist.

## 2022-01-10 NOTE — PROGRESS NOTES
Spravato Monitoring Note    Start time of observation: 12:26am  BP check prior to administration: 144/88    Hyun Perez   presented 01/10/2022  for clinical monitoring of self administration of Spravato. The patient used a total of 3 devices, self-administered intranasally, with a 5-minute rest between each device.    Each device was checked by klb for appropriate expiration and that 2 green indicator dots were present prior to patient administration. Afterwards, the device was checked by klb to ensure the green indicator dots were gone and the full medication was delivered.    Patient check at 1:08pm with BP reading of 139/84  Patient check at 2:28pm with BP reading of 124/79    End time of observation: 2:28pm    Patient monitored for 2 hours Patient tolerated the procedure well without complications..       NDC [id number]  LOT [id number]  EXP 2023 Jul

## 2022-01-11 DIAGNOSIS — G47.09 OTHER INSOMNIA: Chronic | ICD-10-CM

## 2022-01-11 DIAGNOSIS — F33.2 SEVERE EPISODE OF RECURRENT MAJOR DEPRESSIVE DISORDER, WITHOUT PSYCHOTIC FEATURES: Chronic | ICD-10-CM

## 2022-01-11 RX ORDER — TRAZODONE HYDROCHLORIDE 100 MG/1
100 TABLET ORAL NIGHTLY
Qty: 30 TABLET | Refills: 0 | Status: SHIPPED | OUTPATIENT
Start: 2022-01-11 | End: 2022-01-31 | Stop reason: ALTCHOICE

## 2022-01-13 NOTE — PROGRESS NOTES
I have reviewed the notes, assessments, and/or procedures performed by Mckenzie Guzman , I concur with her/his documentation of Hyun Perez.

## 2022-01-14 ENCOUNTER — OFFICE VISIT (OUTPATIENT)
Dept: PSYCHIATRY | Facility: CLINIC | Age: 42
End: 2022-01-14

## 2022-01-14 DIAGNOSIS — F41.0 GENERALIZED ANXIETY DISORDER WITH PANIC ATTACKS: Chronic | ICD-10-CM

## 2022-01-14 DIAGNOSIS — F33.2 SEVERE EPISODE OF RECURRENT MAJOR DEPRESSIVE DISORDER, WITHOUT PSYCHOTIC FEATURES: Primary | Chronic | ICD-10-CM

## 2022-01-14 DIAGNOSIS — F41.1 GENERALIZED ANXIETY DISORDER WITH PANIC ATTACKS: Chronic | ICD-10-CM

## 2022-01-14 PROCEDURE — 99415 PROLNG CLIN STAFF SVC 1ST HR: CPT

## 2022-01-14 PROCEDURE — 99215 OFFICE O/P EST HI 40 MIN: CPT

## 2022-01-14 NOTE — PROGRESS NOTES
Subjective   Hyun Perez is a 42 y.o. female who presents today for Spravato tx #11    Chief Complaint:  Treatment Resistant Depression    History of Present Illness:  Prior to Spravato:  -Hx of tx resistent depression failing numerous medications over many years, and insomnia.  -Significant FmHx of successful SA in mother, grandmother and extended family. Her mother passed away early 2021.   -FmHx of bipolar d/o w/o diagnosis herself. Admits to 9 possibly manic symptoms on MDQ.  -On 300mg Wellbutrin daily for depression with little to no efficacy.  -Struggles with SI daily without a plan or anticipating actions.  -Vraylar caused minimal improvements and worsened irritability, also caused oversedating in the day.  -Been reluctant to counseling due to being an atheist and fearing discrimination and judgement.    1/10/22: Today the pt still reported feeling depressed, last session was 3 days ago, depression is  rated as 7-8/10, but she noticed that now she has days when she does not have SI, more days when she is able to get distracted .  The pt c/o decreased energy level, poor motivations   She always feels more depressed this time of the year . The pt denied AVH/SI/HI, did not bring up any delusional content for the discussion, she enjoys time spending with her dogs     1/14/22: Pt reports continuing to notice improvement in her depression, but continues to have incomplete efficacy. Effects seem to last from one visit to the next on the current dose, but continues to have depression.  -Reports continuing to tolerate treatments with no perceived side effects.  -Says she wants to stop Lamictal because of night sweats and sugar cravings, and her continued anxiety surrounding side effects from medications.  -Denies AVH or SI/HI.    The following portions of the patient's history were reviewed and updated as appropriate: allergies, current medications, past family history, past medical history, past social history,  past surgical history and problem list.    PAST PSYCHIATRIC HISTORY  Diagnosis treated:  Affective/Bipolar Disorder, Anxiety/Panic Disorder, ADHD dx in childhood.  Treatment Type:  Medication Management  Prior Psychiatric Medications:  Bupropion - takes edge off a little bit.  prozac - inefficacy.  depakote - inefficacy.  seroquel - inefficacy.  zoloft - inefficacy.  paxil - inefficacy.  Duloxetine - inefficacy.  3mg Vraylar - oversedating and caused sugar cravings.  Trazodone - ineffective at 50-100mg nightly.  Lamictal - night sweats and sugar cravings.  Spravato  *Others the patient cannot recall the names of.  Support Groups:  None  Sequelae Of Mental Disorder:  job disruption, social isolation, family disruption, financial hardships, emotional distress.    Interval History  Improved on Spravato.    Side Effects  Lamictal - night sweats and sugar cravings.    Past Medical History:  Past Medical History:   Diagnosis Date   • Anxiety      Social History:  Social History     Socioeconomic History   • Marital status: Single   Tobacco Use   • Smoking status: Former Smoker   • Smokeless tobacco: Never Used   Vaping Use   • Vaping Use: Never used   Substance and Sexual Activity   • Alcohol use: Yes     Comment: occ wine   • Drug use: Not Currently   • Sexual activity: Defer     Family History:  Family History   Problem Relation Age of Onset   • Hypertension Mother    • Coronary artery disease Mother         CABG x4 in late 30s   • Depression Mother         mother committed suicide   • Hypertension Maternal Grandmother    • Diabetes Maternal Grandmother    • Depression Maternal Grandmother         committed suicide   • Hypertension Maternal Grandfather    • Diabetes Maternal Grandfather    • Heart attack Maternal Grandfather    • Depression Maternal Cousin         committed suicide   • Depression Maternal Aunt         committed suicide     Past Surgical History:  Past Surgical History:   Procedure Laterality Date   •  GILMER  1997   • TUBAL ABDOMINAL LIGATION Bilateral 2015     Problem List:  Patient Active Problem List   Diagnosis   • Seasonal allergies   • Reduced libido   • Class 2 severe obesity due to excess calories with serious comorbidity and body mass index (BMI) of 38.0 to 38.9 in adult ([facility])   • Severe episode of recurrent major depressive disorder, without psychotic features ([facility])   • Essential hypertension   • Hot flashes due to menopause   • Generalized anxiety disorder with panic attacks   • Varicose veins of lower extremity   • Mixed hyperlipidemia   • Chest pain   • Epigastric pain   • Diffuse arthralgia   • Other insomnia   • Bipolar II disorder ([facility])     Allergy:   Allergies   Allergen Reactions   • Banana Nausea Only   • Kiwi Extract Nausea Only   • Penicillin V Potassium Swelling   • Milk Thistle Other (See Comments)      Discontinued Medications:  There are no discontinued medications.   Current Medications:   Current Outpatient Medications   Medication Sig Dispense Refill   • allopurinol (Zyloprim) 100 MG tablet Take 1 tablet by mouth 2 (Two) Times a Day. 180 tablet 1   • atorvastatin (LIPITOR) 20 MG tablet Take 1 tablet by mouth Every Night. 90 tablet 1   • buPROPion XL (Wellbutrin XL) 300 MG 24 hr tablet Take 1 tablet by mouth Daily. 90 tablet 1   • celecoxib (CeleBREX) 200 MG capsule Take 1 capsule by mouth 2 (Two) Times a Day As Needed for Moderate Pain . Take with food! 60 capsule 5   • cetirizine (zyrTEC) 10 MG tablet Take 1 tablet by mouth Every Night. 90 tablet 1   • Esketamine HCl, 84 MG Dose, Nasal Solution 6 sprays into the nostril(s) as directed by provider 2 (Two) Times a Week. 2 each 3   • fluticasone (FLONASE) 50 MCG/ACT nasal spray 1 spray into the nostril(s) as directed by provider Daily As Needed for Rhinitis or Allergies. DO NOT SNIFF! 48 g 1   • lamoTRIgine (LaMICtal) 100 MG tablet Take 1 tablet by mouth Daily for 7 days, THEN 2 tablets Daily for 25 days. 57 tablet 0   • lisinopril  (PRINIVIL,ZESTRIL) 10 MG tablet TAKE 1 TABLET BY MOUTH DAILY 90 tablet 1   • montelukast (SINGULAIR) 10 MG tablet      • omeprazole (priLOSEC) 40 MG capsule Take 40 mg by mouth Daily.     • traZODone (DESYREL) 100 MG tablet TAKE 1 TABLET BY MOUTH EVERY NIGHT 30 tablet 0     Current Facility-Administered Medications   Medication Dose Route Frequency Provider Last Rate Last Admin   • Esketamine HCl (84 MG Dose) Nasal Solution 84 mg  84 mg Nasal Once per day on Mon Thu Jaleel Byers PA-C   84 mg at 01/14/22 1503     Psychological ROS: positive for - anxiety, concentration difficulties, depression, irritability, memory difficulties, mood swings, sleep disturbances and suicidal ideation. All improved, except for concentration and focus.  negative for - disorientation, hallucinations or hostility    Physical Exam:  BP Prior to tx: 129/84  BP 40 min into tx: 128/86  BP prior to discharge 130/84    Mental Status Exam:   Orientation:  To person, Place, Time and Situation  Memory: Recent and remote memory Intact  Mood/Affect: Depressed  Suicidal Ideations: None   Homicidal Ideations:  None  Hallucinations: None  Delusions:  None  Obsessions: None  Behavior and Psychomotor Activity: Appropriate  Speech:  Normal but minimal  Thought Process: Goal directed  Associations: Intact  Thought Content: mood congruent   Language: name objects and repeat phrases  Concentration and computation: Fair  Attention Span: Fair  Fund of Knowledge: Fair  Reliability: fair  Insight into mental health: Fair  Judgement: Fair  Impulse Control:  Fair  Hygiene: Good  Cooperation:  Cooperative  Eye Contact:  Fair  Physical/Medical Issues:  Yes HLD, obesity, HTN, GERD.     MSE reviewed and accepted without needed changes from previous visit.    PHQ-9 Depression Screening: Pt deferred for today.  Little interest or pleasure in doing things?     Feeling down, depressed, or hopeless?     Trouble falling or staying asleep, or sleeping too much?      Feeling tired or having little energy?     Poor appetite or overeating?     Feeling bad about yourself - or that you are a failure or have let yourself or your family down?     Trouble concentrating on things, such as reading the newspaper or watching television?     Moving or speaking so slowly that other people could have noticed? Or the opposite - being so fidgety or restless that you have been moving around a lot more than usual?     Thoughts that you would be better off dead, or of hurting yourself in some way?     PHQ-9 Total Score     If you checked off any problems, how difficult have these problems made it for you to do your work, take care of things at home, or get along with other people?       Former Smoker  I advised Hyun of the risks of tobacco use.     Assessment/Plan   Diagnoses and all orders for this visit:    1. Severe episode of recurrent major depressive disorder, without psychotic features (HCC) (Primary)    2. Generalized anxiety disorder with panic attacks    -d/c Lamictal at pt request. She has been taking 100mg, and is instructed to take half a tab for a week before stopping. Advised her depression could get worse if it is stopped, but she insists on stopping the medication.  -Cont Spravato 84 mg 2 times per week, tolerated well.  -Cont. 300mg Wellbutin daily.  -Counseling continues to be encourage.  -F/U in 3 days, see if pt ever started with the atheist friendly therapist, monitor improvement with Spravato, and see how pt is tolerating the lower 50mg dose of Lamictal.  -In the future, continue to monitor cholesterol, sodium, and wt.    Visit Diagnoses:    ICD-10-CM ICD-9-CM   1. Severe episode of recurrent major depressive disorder, without psychotic features (HCC)  F33.2 296.33   2. Generalized anxiety disorder with panic attacks  F41.1 300.02    F41.0 300.01      TREATMENT PLAN/GOALS: In the short-term, the patient is to continue supportive psychotherapy efforts and medications as  indicated. Treatment and medication options were discussed during today's visit. Long-term the goal will be to control symptoms so they do not negatively interfere with other aspects of the patient's life. Prognosis is optimistic with implementation of the current treatment plan. Patient ackowledged and verbally consented to the treatment plan and was educated on the importance of compliance with treatment and follow-up appointments.    MEDICATION ISSUES:  INSPECT reviewed as expected.     MEDS ORDERED DURING VISIT:  No orders of the defined types were placed in this encounter.    Return in about 3 days (around 1/17/2022) for Next scheduled follow up.     I, Jaleel Byers PA-C, was present in the office suite and immediately available to furnish assistance and direction throughout the entire observation time.     Patient tolerated the procedure well without complications..     Side effects of treatment were mild and included sedation, dizziness, mild euphoria.    After the observation time, the patient was assessed by me and considered stable to leave the office with assistance. Hyun Perez  was advised not to drive or operate machinery until tomorrow following a full night's sleep.    Jaleel Byers PA-C  01/17/22   12:26 EST      This document has been electronically signed by Jaleel Byers PA-C  January 17, 2022 00:25 EST    [facility] Dragon transcription disclaimer:  Part of this note may be an electronic transcription/translation of spoken language to printed text using the Dragon Dictation System.

## 2022-01-14 NOTE — PROGRESS NOTES
Spravato Monitoring Note    Start time of observation: 12:52pm  BP check prior to administration: 129/84    Hyun Perez   presented 01/14/2022  for clinical monitoring of self administration of Spravato. The patient used a total of 3 devices, self-administered intranasally, with a 5-minute rest between each device.    Each device was checked by klb for appropriate expiration and that 2 green indicator dots were present prior to patient administration. Afterwards, the device was checked by klb to ensure the green indicator dots were gone and the full medication was delivered.    Patient check at 1:35pm with BP reading of 128/86  Patient check at 2:55pm with BP reading of 130/84    End time of observation: 2:55pm    Patient monitored for 2 hours Patient tolerated the procedure well without complications..       NDC [id number]  LOT [id number]   EXP 2023 Jul

## 2022-01-17 ENCOUNTER — OFFICE VISIT (OUTPATIENT)
Dept: PSYCHIATRY | Facility: CLINIC | Age: 42
End: 2022-01-17

## 2022-01-17 DIAGNOSIS — F33.2 SEVERE EPISODE OF RECURRENT MAJOR DEPRESSIVE DISORDER, WITHOUT PSYCHOTIC FEATURES: Primary | Chronic | ICD-10-CM

## 2022-01-17 DIAGNOSIS — F41.0 GENERALIZED ANXIETY DISORDER WITH PANIC ATTACKS: Chronic | ICD-10-CM

## 2022-01-17 DIAGNOSIS — F41.1 GENERALIZED ANXIETY DISORDER WITH PANIC ATTACKS: Chronic | ICD-10-CM

## 2022-01-17 DIAGNOSIS — G47.09 OTHER INSOMNIA: ICD-10-CM

## 2022-01-17 PROCEDURE — 99415 PROLNG CLIN STAFF SVC 1ST HR: CPT

## 2022-01-17 PROCEDURE — 99215 OFFICE O/P EST HI 40 MIN: CPT

## 2022-01-17 NOTE — PROGRESS NOTES
"Subjective   Hyun Perez is a 42 y.o. female who presents today for Spravato tx #12    Chief Complaint:  Treatment Resistant Depression    History of Present Illness:  Prior to Spravato:  -Hx of tx resistent depression failing numerous medications over many years, and insomnia.  -Significant FmHx of successful SA in mother, grandmother and extended family. Her mother passed away early 2021.   -FmHx of bipolar d/o w/o diagnosis herself. Admits to 9 possibly manic symptoms on MDQ.  -On 300mg Wellbutrin daily for depression with little to no efficacy.  -Struggles with SI daily without a plan or anticipating actions.  -Vraylar caused minimal improvements and worsened irritability, also caused oversedating in the day.  -Been reluctant to counseling due to being an atheist and fearing discrimination and judgement.    1/10/22: Today the pt still reported feeling depressed, last session was 3 days ago, depression is  rated as 7-8/10, but she noticed that now she has days when she does not have SI, more days when she is able to get distracted .  The pt c/o decreased energy level, poor motivations   She always feels more depressed this time of the year . The pt denied AVH/SI/HI, did not bring up any delusional content for the discussion, she enjoys time spending with her dogs     1/14/22: Pt reports continuing to notice improvement in her depression, but continues to have incomplete efficacy. Effects seem to last from one visit to the next on the current dose, but continues to have depression.  -Reports continuing to tolerate treatments with no perceived side effects.  -Says she wants to stop Lamictal because of night sweats and sugar cravings, and her continued anxiety surrounding side effects from medications.  -Denies AVH or HI, but continues to have occasional SI that are less frequent.    1/17/21: Pt reports small improvements with Spravato. She would now like to stop trazodone because it makes her feel \"foggy\" and " "tired, but she is still taking 100mg Lamictal with continued plans to stop the medication due to night sweats and sugar cravings. She denies any extreme anxiety recently, and continued depression. Pt would like to consider trying another mood stabilizer Mckenzie told her would help with wt loss. Denies side effects from Spravato, AVH, or HI, but continues to have occasional SI that are less frequent.    The following portions of the patient's history were reviewed and updated as appropriate: allergies, current medications, past family history, past medical history, past social history, past surgical history and problem list.    PAST PSYCHIATRIC HISTORY  Diagnosis treated:  Affective/Bipolar Disorder, Anxiety/Panic Disorder, ADHD dx in childhood.  Treatment Type:  Medication Management  Prior Psychiatric Medications:  Bupropion - takes edge off a little bit.  prozac - inefficacy.  depakote - inefficacy.  seroquel - inefficacy.  zoloft - inefficacy.  paxil - inefficacy.  Duloxetine - inefficacy.  3mg Vraylar - oversedating and caused sugar cravings.  Trazodone - effective at 100mg nightly; makes her feel \"foggy\" and tired.  Lamictal - night sweats and sugar cravings.  Spravato  *Others the patient cannot recall the names of.  Support Groups:  None  Sequelae Of Mental Disorder:  job disruption, social isolation, family disruption, financial hardships, emotional distress.    Interval History  Possibly slight improvement with continued Spravato.    Side Effects  Trazodone -  effective at 100mg nightly; makes her feel \"foggy\" and tired.  Lamictal - night sweats and sugar cravings.    Past Medical History:  Past Medical History:   Diagnosis Date   • Anxiety      Social History:  Social History     Socioeconomic History   • Marital status: Single   Tobacco Use   • Smoking status: Former Smoker   • Smokeless tobacco: Never Used   Vaping Use   • Vaping Use: Never used   Substance and Sexual Activity   • Alcohol use: Yes     " Comment: occ wine   • Drug use: Not Currently   • Sexual activity: Defer     Family History:  Family History   Problem Relation Age of Onset   • Hypertension Mother    • Coronary artery disease Mother         CABG x4 in late 30s   • Depression Mother         mother committed suicide   • Hypertension Maternal Grandmother    • Diabetes Maternal Grandmother    • Depression Maternal Grandmother         committed suicide   • Hypertension Maternal Grandfather    • Diabetes Maternal Grandfather    • Heart attack Maternal Grandfather    • Depression Maternal Cousin         committed suicide   • Depression Maternal Aunt         committed suicide     Past Surgical History:  Past Surgical History:   Procedure Laterality Date   • LEEP  1997   • TUBAL ABDOMINAL LIGATION Bilateral 2015     Problem List:  Patient Active Problem List   Diagnosis   • Seasonal allergies   • Reduced libido   • Class 2 severe obesity due to excess calories with serious comorbidity and body mass index (BMI) of 38.0 to 38.9 in adult ([facility])   • Severe episode of recurrent major depressive disorder, without psychotic features ([facility])   • Essential hypertension   • Hot flashes due to menopause   • Generalized anxiety disorder with panic attacks   • Varicose veins of lower extremity   • Mixed hyperlipidemia   • Chest pain   • Epigastric pain   • Diffuse arthralgia   • Other insomnia   • Bipolar II disorder ([facility])     Allergy:   Allergies   Allergen Reactions   • Banana Nausea Only   • Kiwi Extract Nausea Only   • Penicillin V Potassium Swelling   • Milk Thistle Other (See Comments)      Discontinued Medications:  There are no discontinued medications.   Current Medications:   Current Outpatient Medications   Medication Sig Dispense Refill   • allopurinol (Zyloprim) 100 MG tablet Take 1 tablet by mouth 2 (Two) Times a Day. 180 tablet 1   • atorvastatin (LIPITOR) 20 MG tablet Take 1 tablet by mouth Every Night. 90 tablet 1   • buPROPion XL (Wellbutrin XL) 300 MG  24 hr tablet Take 1 tablet by mouth Daily. 90 tablet 1   • celecoxib (CeleBREX) 200 MG capsule Take 1 capsule by mouth 2 (Two) Times a Day As Needed for Moderate Pain . Take with food! 60 capsule 5   • cetirizine (zyrTEC) 10 MG tablet Take 1 tablet by mouth Every Night. 90 tablet 1   • Esketamine HCl, 84 MG Dose, Nasal Solution 6 sprays into the nostril(s) as directed by provider 2 (Two) Times a Week. 2 each 3   • fluticasone (FLONASE) 50 MCG/ACT nasal spray 1 spray into the nostril(s) as directed by provider Daily As Needed for Rhinitis or Allergies. DO NOT SNIFF! 48 g 1   • lamoTRIgine ER (LaMICtal XR) 50 MG tablet sustained-release 24 hour Take 1 tablet by mouth Daily. 14 tablet 0   • lisinopril (PRINIVIL,ZESTRIL) 10 MG tablet TAKE 1 TABLET BY MOUTH DAILY 90 tablet 1   • montelukast (SINGULAIR) 10 MG tablet      • omeprazole (priLOSEC) 40 MG capsule Take 40 mg by mouth Daily.     • traZODone (DESYREL) 100 MG tablet TAKE 1 TABLET BY MOUTH EVERY NIGHT 30 tablet 0     Current Facility-Administered Medications   Medication Dose Route Frequency Provider Last Rate Last Admin   • Esketamine HCl (84 MG Dose) Nasal Solution 84 mg  84 mg Nasal Once per day on Mon Thu Jaleel Byers PA-C   84 mg at 01/17/22 1307     Psychological ROS: positive for - anxiety, concentration difficulties, depression, irritability, memory difficulties, mood swings, sleep disturbances and suicidal ideation. All improved slightly, except for concentration and focus.  negative for - disorientation, hallucinations or hostility    Physical Exam:  BP Prior to tx: 133/85  BP 40 min into tx: 148/88  BP prior to discharge: 124/85    Mental Status Exam:   Orientation:  To person, Place, Time and Situation  Memory: Recent and remote memory Intact  Mood/Affect: Depressed  Suicidal Ideations: None   Homicidal Ideations:  None  Hallucinations: None  Delusions:  None  Obsessions: None  Behavior and Psychomotor Activity: Appropriate  Speech:  Normal  but minimal  Thought Process: Goal directed  Associations: Intact  Thought Content: mood congruent   Language: name objects and repeat phrases  Concentration and computation: Fair  Attention Span: Fair  Fund of Knowledge: Fair  Reliability: fair  Insight into mental health: Fair  Judgement: Fair  Impulse Control:  Fair  Hygiene: Good  Cooperation:  Cooperative  Eye Contact:  Fair  Physical/Medical Issues:  Yes HLD, obesity, HTN, GERD.     MSE reviewed and accepted without needed changes from previous visit.    PHQ-9 Depression Screening:   Little interest or pleasure in doing things? 3   Feeling down, depressed, or hopeless? 2   Trouble falling or staying asleep, or sleeping too much? 3   Feeling tired or having little energy? 3   Poor appetite or overeating? 2   Feeling bad about yourself - or that you are a failure or have let yourself or your family down? 2   Trouble concentrating on things, such as reading the newspaper or watching television? 3   Moving or speaking so slowly that other people could have noticed? Or the opposite - being so fidgety or restless that you have been moving around a lot more than usual? 1   Thoughts that you would be better off dead, or of hurting yourself in some way? 1   PHQ-9 Total Score 20   If you checked off any problems, how difficult have these problems made it for you to do your work, take care of things at home, or get along with other people? Extremely dIfficult   Feeling nervous, anxious or on edge: Several days  Not being able to stop or control worrying: Several days  Worrying too much about different things: Several days  Trouble Relaxing: Not at all  Being so restless that it is hard to sit still: Not at all  Feeling afraid as if something awful might happen: Not at all  Becoming easily annoyed or irritable: Not at all  ISIDRO 7 Total Score: 3  If you checked any problems, how difficult have these problems made it for you to do your work, take care of things at home, or  "get along with other people: Somewhat difficult  PHQ-9: 20; severe depression.  ISIDRO-7: 3; anxiety in remission.    Former Smoker  I advised Hyun of the risks of tobacco use.     Assessment/Plan   Diagnoses and all orders for this visit:    1. Severe episode of recurrent major depressive disorder, without psychotic features (HCC) (Primary)    2. Generalized anxiety disorder with panic attacks    3. Other insomnia    PHQ-9: 20; severe depression.  ISIDRO-7: 3; anxiety in remission.  -d/c trazodone at pt request due to \"fogginess\" and fatigue on the med.  -d/c Lamictal at pt request due to sugar craving and night sweats. She is still taking 100mg, and is reminded to take half a tab for a week before stopping. Advised her that her depression could get worse if it is stopped, but she still insists on stopping the medication.  -Cont Spravato 84 mg 2 times per week, tolerated well.  -Cont. 300mg Wellbutin daily.  -Counseling continues to be encouraged.  -F/U in 4 days, for next Spravato tx. See if pt ever started with the atheist friendly therapist, monitor improvement with Spravato, and see how pt is tolerating the lower 50mg dose of Lamictal. Consider Topomax as alternative mood stabilizer.    -In the future, continue to monitor cholesterol, sodium, and wt.    Visit Diagnoses:    ICD-10-CM ICD-9-CM   1. Severe episode of recurrent major depressive disorder, without psychotic features (HCC)  F33.2 296.33   2. Generalized anxiety disorder with panic attacks  F41.1 300.02    F41.0 300.01   3. Other insomnia  G47.09 780.52      TREATMENT PLAN/GOALS: In the short-term, the patient is to continue supportive psychotherapy efforts and medications as indicated. Treatment and medication options were discussed during today's visit. Long-term the goal will be to control symptoms so they do not negatively interfere with other aspects of the patient's life. Prognosis is optimistic with implementation of the current treatment plan. " Patient ackowledged and verbally consented to the treatment plan and was educated on the importance of compliance with treatment and follow-up appointments.    MEDICATION ISSUES:  INSPECT reviewed as expected.     MEDS ORDERED DURING VISIT:  No orders of the defined types were placed in this encounter.    Return in about 4 days (around 1/21/2022) for Next scheduled follow up.     I, Jaleel Byers PA-C, was present in the office suite and immediately available to furnish assistance and direction throughout the entire observation time.     Patient tolerated the procedure well without complications..     Side effects of treatment were mild and included sedation, dizziness, mild euphoria.    After the observation time, the patient was assessed by me and considered stable to leave the office with assistance. Hyun Perez  was advised not to drive or operate machinery until tomorrow following a full night's sleep.    Jaleel Byers PA-C  01/24/22   12:26 EST      This document has been electronically signed by Jaleel Byers PA-C  January 24, 2022 01:52 EST    EMR Dragon transcription disclaimer:  Part of this note may be an electronic transcription/translation of spoken language to printed text using the Dragon Dictation System.

## 2022-01-17 NOTE — PROGRESS NOTES
Spravato Monitoring Note    Start time of observation: 1:07pm  BP check prior to administration: 133/85    Hyun Perez   presented 01/17/2022  for clinical monitoring of self administration of Spravato. The patient used a total of 3 devices, self-administered intranasally, with a 5-minute rest between each device.    Each device was checked by klb for appropriate expiration and that 2 green indicator dots were present prior to patient administration. Afterwards, the device was checked by klb to ensure the green indicator dots were gone and the full medication was delivered.    Patient check at 1:50pm with BP reading of 148/88  Patient check at 3:10pm with BP reading of 124/85    End time of observation: 3:10pm    Patient monitored for 2 hours Patient tolerated the procedure well without complications..       NDC [id number]  LOT [id number]   EXP 2023 Aug

## 2022-01-18 DIAGNOSIS — F41.1 GENERALIZED ANXIETY DISORDER WITH PANIC ATTACKS: Chronic | ICD-10-CM

## 2022-01-18 DIAGNOSIS — F33.2 SEVERE EPISODE OF RECURRENT MAJOR DEPRESSIVE DISORDER, WITHOUT PSYCHOTIC FEATURES: Primary | Chronic | ICD-10-CM

## 2022-01-18 DIAGNOSIS — F41.0 GENERALIZED ANXIETY DISORDER WITH PANIC ATTACKS: Chronic | ICD-10-CM

## 2022-01-19 RX ORDER — LAMOTRIGINE 50 MG/1
50 TABLET, EXTENDED RELEASE ORAL DAILY
Qty: 14 TABLET | Refills: 0 | Status: SHIPPED | OUTPATIENT
Start: 2022-01-19 | End: 2022-01-28 | Stop reason: ALTCHOICE

## 2022-01-19 RX ORDER — LAMOTRIGINE 100 MG/1
TABLET ORAL
Qty: 60 TABLET | OUTPATIENT
Start: 2022-01-19

## 2022-01-24 ENCOUNTER — OFFICE VISIT (OUTPATIENT)
Dept: PSYCHIATRY | Facility: CLINIC | Age: 42
End: 2022-01-24

## 2022-01-24 DIAGNOSIS — F33.2 SEVERE EPISODE OF RECURRENT MAJOR DEPRESSIVE DISORDER, WITHOUT PSYCHOTIC FEATURES: Primary | Chronic | ICD-10-CM

## 2022-01-24 DIAGNOSIS — F41.1 GENERALIZED ANXIETY DISORDER WITH PANIC ATTACKS: Chronic | ICD-10-CM

## 2022-01-24 DIAGNOSIS — F41.0 GENERALIZED ANXIETY DISORDER WITH PANIC ATTACKS: Chronic | ICD-10-CM

## 2022-01-24 DIAGNOSIS — E66.01 CLASS 2 SEVERE OBESITY DUE TO EXCESS CALORIES WITH SERIOUS COMORBIDITY AND BODY MASS INDEX (BMI) OF 38.0 TO 38.9 IN ADULT: ICD-10-CM

## 2022-01-24 DIAGNOSIS — G47.09 OTHER INSOMNIA: ICD-10-CM

## 2022-01-24 PROCEDURE — 99215 OFFICE O/P EST HI 40 MIN: CPT

## 2022-01-24 PROCEDURE — 99415 PROLNG CLIN STAFF SVC 1ST HR: CPT

## 2022-01-24 RX ORDER — TOPIRAMATE 25 MG/1
TABLET ORAL
Qty: 49 TABLET | Refills: 0 | Status: SHIPPED | OUTPATIENT
Start: 2022-01-24 | End: 2022-01-31 | Stop reason: SINTOL

## 2022-01-24 NOTE — PROGRESS NOTES
Spravato Monitoring Note    Start time of observation: 1:06pm  BP check prior to administration: 137/82    Hyun Perez   presented 01/24/2022  for clinical monitoring of self administration of Spravato. The patient used a total of 3 devices, self-administered intranasally, with a 5-minute rest between each device.    Each device was checked by klb for appropriate expiration and that 2 green indicator dots were present prior to patient administration. Afterwards, the device was checked by klb to ensure the green indicator dots were gone and the full medication was delivered.    Patient check at 1:48pm with BP reading of 149/99  Patient check at 3:08pm with BP reading of 136/89    End time of observation: 3:08pm    Patient monitored for 2 hours Patient tolerated the procedure well without complications..       NDC [id number]   LOT [id number]  EXP 2023 Aug

## 2022-01-24 NOTE — PROGRESS NOTES
"Subjective   Hyun Perez is a 42 y.o. female who presents today for Spravato tx #13    Chief Complaint:  Treatment Resistant Depression    History of Present Illness:  Prior to Spravato:  -Hx of tx resistent depression failing numerous medications over many years, and insomnia.  -Significant FmHx of successful SA in mother, grandmother and extended family. Her mother passed away early 2021.   -FmHx of bipolar d/o w/o diagnosis herself. Admits to 9 possibly manic symptoms on MDQ.  -On 300mg Wellbutrin daily for depression with little to no efficacy.  -Struggles with SI daily without a plan or anticipating actions.  -Vraylar caused minimal improvements and worsened irritability, also caused oversedating in the day.  -Been reluctant to counseling due to being an atheist and fearing discrimination and judgement.    1/10/22: Today the pt still reported feeling depressed, last session was 3 days ago, depression is  rated as 7-8/10, but she noticed that now she has days when she does not have SI, more days when she is able to get distracted .  The pt c/o decreased energy level, poor motivations   She always feels more depressed this time of the year . The pt denied AVH/SI/HI, did not bring up any delusional content for the discussion, she enjoys time spending with her dogs     1/14/22: Pt reports continuing to notice improvement in her depression, but continues to have incomplete efficacy. Effects seem to last from one visit to the next on the current dose, but continues to have depression.  -Reports continuing to tolerate treatments with no perceived side effects.  -Says she wants to stop Lamictal because of night sweats and sugar cravings, and her continued anxiety surrounding side effects from medications.  -Denies AVH or HI, but continues to have occasional SI that are less frequent.    1/17/21: Pt reports small improvements with Spravato. She would now like to stop trazodone because it makes her feel \"foggy\" and " "tired, but she is still taking 100mg Lamictal with continued plans to stop the medication due to night sweats and sugar cravings. She denies any extreme anxiety recently, and continued depression. Pt would like to consider trying another mood stabilizer Mckenzie told her would help with wt loss. Denies side effects from Spravato, AVH, or HI, but continues to have occasional SI that are less frequent.    1/24/22: Felt better the past week than she has in a long time, and pt contributes this to Spravato. On 50mg Lamictal for one week now and planning to stop. Pt is willing to try Topomax since it is more likely to help her with weight loss. Lamictal did seem helpful for her, but the night sweats are too difficult. Continues to c/o difficulty finding motivation to fill her pill box and do chores around her home, but has been able to do some lately. Denies anxiety for \"a while now,\" and gave an example that she previously was almost agoraphobic not wanting to leave her house and having extreme anxiety while driving. Driving has not been troublesome for her recently.    The following portions of the patient's history were reviewed and updated as appropriate: allergies, current medications, past family history, past medical history, past social history, past surgical history and problem list.    PAST PSYCHIATRIC HISTORY  Diagnosis treated:  Affective/Bipolar Disorder, Anxiety/Panic Disorder, ADHD dx in childhood.  Treatment Type:  Medication Management  Prior Psychiatric Medications:  Bupropion - takes edge off a little a bit.  prozac - inefficacy.  depakote - inefficacy.  seroquel - inefficacy.  zoloft - inefficacy.  paxil - inefficacy.  Duloxetine - inefficacy.  3mg Vraylar - oversedating and caused sugar cravings.  Trazodone - effective at 100mg nightly; makes her feel \"foggy\" and tired.  Lamictal - night sweats and sugar cravings.  Spravato  *Others the patient cannot recall the names of.  Support Groups:  None  Sequelae " Of Mental Disorder:  job disruption, social isolation, family disruption, financial hardships, emotional distress.    Interval History  Improved dep and anx    Side Effects  Denies    Past Medical History:  Past Medical History:   Diagnosis Date   • Anxiety      Social History:  Social History     Socioeconomic History   • Marital status: Single   Tobacco Use   • Smoking status: Former Smoker   • Smokeless tobacco: Never Used   Vaping Use   • Vaping Use: Never used   Substance and Sexual Activity   • Alcohol use: Yes     Comment: occ wine   • Drug use: Not Currently   • Sexual activity: Defer     Family History:  Family History   Problem Relation Age of Onset   • Hypertension Mother    • Coronary artery disease Mother         CABG x4 in late 30s   • Depression Mother         mother committed suicide   • Hypertension Maternal Grandmother    • Diabetes Maternal Grandmother    • Depression Maternal Grandmother         committed suicide   • Hypertension Maternal Grandfather    • Diabetes Maternal Grandfather    • Heart attack Maternal Grandfather    • Depression Maternal Cousin         committed suicide   • Depression Maternal Aunt         committed suicide     Past Surgical History:  Past Surgical History:   Procedure Laterality Date   • LEEP  1997   • TUBAL ABDOMINAL LIGATION Bilateral 2015     Problem List:  Patient Active Problem List   Diagnosis   • Seasonal allergies   • Reduced libido   • Class 2 severe obesity due to excess calories with serious comorbidity and body mass index (BMI) of 38.0 to 38.9 in adult ([facility])   • Severe episode of recurrent major depressive disorder, without psychotic features ([facility])   • Essential hypertension   • Hot flashes due to menopause   • Generalized anxiety disorder with panic attacks   • Varicose veins of lower extremity   • Mixed hyperlipidemia   • Chest pain   • Epigastric pain   • Diffuse arthralgia   • Other insomnia   • Bipolar II disorder ([facility])     Allergy:   Allergies    Allergen Reactions   • Banana Nausea Only   • Kiwi Extract Nausea Only   • Penicillin V Potassium Swelling   • Milk Thistle Other (See Comments)      Discontinued Medications:  Medications Discontinued During This Encounter   Medication Reason   • lamoTRIgine ER (LaMICtal XR) 50 MG tablet sustained-release 24 hour Alternate therapy      Current Medications:   Current Outpatient Medications   Medication Sig Dispense Refill   • allopurinol (Zyloprim) 100 MG tablet Take 1 tablet by mouth 2 (Two) Times a Day. 180 tablet 1   • atorvastatin (LIPITOR) 20 MG tablet Take 1 tablet by mouth Every Night. 90 tablet 1   • buPROPion XL (Wellbutrin XL) 300 MG 24 hr tablet Take 1 tablet by mouth Daily. 90 tablet 1   • celecoxib (CeleBREX) 200 MG capsule Take 1 capsule by mouth 2 (Two) Times a Day As Needed for Moderate Pain . Take with food! 60 capsule 5   • cetirizine (zyrTEC) 10 MG tablet Take 1 tablet by mouth Every Night. 90 tablet 1   • Esketamine HCl, 84 MG Dose, Nasal Solution 6 sprays into the nostril(s) as directed by provider 2 (Two) Times a Week. 2 each 3   • fluticasone (FLONASE) 50 MCG/ACT nasal spray 1 spray into the nostril(s) as directed by provider Daily As Needed for Rhinitis or Allergies. DO NOT SNIFF! 48 g 1   • lisinopril (PRINIVIL,ZESTRIL) 10 MG tablet TAKE 1 TABLET BY MOUTH DAILY 90 tablet 1   • montelukast (SINGULAIR) 10 MG tablet      • omeprazole (priLOSEC) 40 MG capsule Take 40 mg by mouth Daily.     • topiramate (Topamax) 25 MG tablet Take 1 tablet by mouth Every Night for 7 days, THEN 1 tablet 2 (Two) Times a Day for 7 days, THEN 2 tablets Every Night for 14 days. 49 tablet 0   • traZODone (DESYREL) 100 MG tablet TAKE 1 TABLET BY MOUTH EVERY NIGHT 30 tablet 0     Current Facility-Administered Medications   Medication Dose Route Frequency Provider Last Rate Last Admin   • Esketamine HCl (84 MG Dose) Nasal Solution 84 mg  84 mg Nasal Once per day on Mon Thu Jaleel Byers PA-C   84 mg at  01/28/22 1520     Psychological ROS: positive for - anxiety, concentration difficulties, depression, irritability, memory difficulties, mood swings, sleep disturbances and suicidal ideation. All improved slightly, except for concentration and focus.  negative for - disorientation, hallucinations or hostility    Physical Exam:  BP Prior to tx: 137/82  BP 40 min into tx: 149/99  BP prior to discharge: 136/89    Start time of observation: 1:06pm  End time of observation: 3:08pm    Mental Status Exam:   Orientation:  To person, Place, Time and Situation  Memory: Recent and remote memory Intact  Mood/Affect: Depressed  Suicidal Ideations: None   Homicidal Ideations:  None  Hallucinations: None  Delusions:  None  Obsessions: None  Behavior and Psychomotor Activity: Appropriate  Speech:  Normal but minimal  Thought Process: Goal directed  Associations: Intact  Thought Content: mood congruent   Language: name objects and repeat phrases  Concentration and computation: Fair  Attention Span: Fair  Fund of Knowledge: Fair  Reliability: fair  Insight into mental health: Fair  Judgement: Fair  Impulse Control:  Fair  Hygiene: Good  Cooperation:  Cooperative  Eye Contact:  Fair  Physical/Medical Issues:  Yes HLD, obesity, HTN, GERD.     MSE reviewed and accepted without needed changes from previous visit.    PHQ-9 Depression Screening:   Little interest or pleasure in doing things? 1   Feeling down, depressed, or hopeless? 1   Trouble falling or staying asleep, or sleeping too much? 1   Feeling tired or having little energy? 3   Poor appetite or overeating? 2   Feeling bad about yourself - or that you are a failure or have let yourself or your family down? 1   Trouble concentrating on things, such as reading the newspaper or watching television? 3   Moving or speaking so slowly that other people could have noticed? Or the opposite - being so fidgety or restless that you have been moving around a lot more than usual? 2   Thoughts  "that you would be better off dead, or of hurting yourself in some way? 1   PHQ-9 Total Score 15   If you checked off any problems, how difficult have these problems made it for you to do your work, take care of things at home, or get along with other people? Very difficult   Feeling nervous, anxious or on edge: Several days  Not being able to stop or control worrying: Several days  Worrying too much about different things: Several days  Trouble Relaxing: Several days  Being so restless that it is hard to sit still: More than half the days  Feeling afraid as if something awful might happen: Not at all  Becoming easily annoyed or irritable: Several days  ISIDRO 7 Total Score: 7  If you checked any problems, how difficult have these problems made it for you to do your work, take care of things at home, or get along with other people: Somewhat difficult  PHQ-9: 15; moderately-severe depression. Previously 20...20s prior to Spravato.  ISIDRO-7: 7; mild anxiety. Previously 3 and in the 20s prior to Spravato.  MDQ: Negative; but likely positive. Only negative due to \"minor problem\" and unclear if manic sx occurred together. 9 manic sx on MDQ. (10/18/21)    Former Smoker  I advised Hyun of the risks of tobacco use.     Assessment/Plan   Diagnoses and all orders for this visit:    1. Severe episode of recurrent major depressive disorder, without psychotic features ([facility]) (Primary)  -     topiramate (Topamax) 25 MG tablet; Take 1 tablet by mouth Every Night for 7 days, THEN 1 tablet 2 (Two) Times a Day for 7 days, THEN 2 tablets Every Night for 14 days.  Dispense: 49 tablet; Refill: 0    2. Generalized anxiety disorder with panic attacks  -     topiramate (Topamax) 25 MG tablet; Take 1 tablet by mouth Every Night for 7 days, THEN 1 tablet 2 (Two) Times a Day for 7 days, THEN 2 tablets Every Night for 14 days.  Dispense: 49 tablet; Refill: 0    3. Other insomnia    4. Class 2 severe obesity due to excess calories with serious " "comorbidity and body mass index (BMI) of 38.0 to 38.9 in adult ([facility])  -     topiramate (Topamax) 25 MG tablet; Take 1 tablet by mouth Every Night for 7 days, THEN 1 tablet 2 (Two) Times a Day for 7 days, THEN 2 tablets Every Night for 14 days.  Dispense: 49 tablet; Refill: 0    PHQ-9: 15; moderately-severe depression. Previously 20...20s prior to Spravato.  ISIDRO-7: 7; mild anxiety. Previously 3 and in the 20s prior to Spravato.  MDQ: Negative; but likely positive. Only negative due to \"minor problem\" and unclear if manic sx occurred together. 9 manic sx on MDQ. (10/18/21)  -d/c lamictal due to pt reported ADRs.  -Start Topamax for mood stabilization, depression, and wt loss. Titration delmar: 25mg nightly, then 25mg bid, then 50mg nightly. New Rx will be sent in the future to continue the med a 50mg bid.  -Cont 84mg Spravato twice weekly due to continued benefit.  -Cont. 300mg Wellbutin daily for depression.  -Counseling continues to be encouraged.  -F/U in 4 days, for next Spravato tx. See if pt ever started with the atheist friendly therapist, monitor improvement with Spravato, and see if Topomax has been started as an alternative mood stabilizer to lamictal.    -In the future, continue to monitor cholesterol, sodium, and wt.    Visit Diagnoses:    ICD-10-CM ICD-9-CM   1. Severe episode of recurrent major depressive disorder, without psychotic features ([facility])  F33.2 296.33   2. Generalized anxiety disorder with panic attacks  F41.1 300.02    F41.0 300.01   3. Other insomnia  G47.09 780.52   4. Class 2 severe obesity due to excess calories with serious comorbidity and body mass index (BMI) of 38.0 to 38.9 in adult ([facility])  E66.01 278.01    Z68.38 V85.38      TREATMENT PLAN/GOALS: In the short-term, the patient is to continue supportive psychotherapy efforts and medications as indicated. Treatment and medication options were discussed during today's visit. Long-term the goal will be to control symptoms so they do not " negatively interfere with other aspects of the patient's life. Prognosis is optimistic with implementation of the current treatment plan. Patient ackowledged and verbally consented to the treatment plan and was educated on the importance of compliance with treatment and follow-up appointments.    MEDICATION ISSUES:  INSPECT reviewed as expected. On 84mg dose pack of Spravato.     MEDS ORDERED DURING VISIT:  New Medications Ordered This Visit   Medications   • topiramate (Topamax) 25 MG tablet     Sig: Take 1 tablet by mouth Every Night for 7 days, THEN 1 tablet 2 (Two) Times a Day for 7 days, THEN 2 tablets Every Night for 14 days.     Dispense:  49 tablet     Refill:  0     Return in about 4 days (around 1/28/2022) for Next scheduled follow up.     I, Jaleel Byers PA-C, was present in the office suite and immediately available to furnish assistance and direction throughout the entire observation time.     Patient tolerated the procedure well without complications..     Side effects of treatment were mild and included sedation, dizziness, mild euphoria.    After the observation time, the patient was assessed by me and considered stable to leave the office with assistance. Hyun Perez  was advised not to drive or operate machinery until tomorrow following a full night's sleep.    Jaleel Byers PA-C  01/28/22   12:26 EST      This document has been electronically signed by Jaleel Byers PA-C  January 28, 2022 18:15 EST    EMR Dragon transcription disclaimer:  Part of this note may be an electronic transcription/translation of spoken language to printed text using the Dragon Dictation System.

## 2022-01-28 ENCOUNTER — OFFICE VISIT (OUTPATIENT)
Dept: PSYCHIATRY | Facility: CLINIC | Age: 42
End: 2022-01-28

## 2022-01-28 DIAGNOSIS — F41.1 GENERALIZED ANXIETY DISORDER WITH PANIC ATTACKS: Chronic | ICD-10-CM

## 2022-01-28 DIAGNOSIS — F41.0 GENERALIZED ANXIETY DISORDER WITH PANIC ATTACKS: Chronic | ICD-10-CM

## 2022-01-28 DIAGNOSIS — E66.01 CLASS 2 SEVERE OBESITY DUE TO EXCESS CALORIES WITH SERIOUS COMORBIDITY AND BODY MASS INDEX (BMI) OF 38.0 TO 38.9 IN ADULT: ICD-10-CM

## 2022-01-28 DIAGNOSIS — G47.09 OTHER INSOMNIA: ICD-10-CM

## 2022-01-28 DIAGNOSIS — F33.2 SEVERE EPISODE OF RECURRENT MAJOR DEPRESSIVE DISORDER, WITHOUT PSYCHOTIC FEATURES: Primary | Chronic | ICD-10-CM

## 2022-01-28 PROCEDURE — 99415 PROLNG CLIN STAFF SVC 1ST HR: CPT

## 2022-01-28 PROCEDURE — 99215 OFFICE O/P EST HI 40 MIN: CPT

## 2022-01-28 NOTE — PROGRESS NOTES
Spravato Monitoring Note    Start time of observation: 12:54pm  BP check prior to administration: 115/78    Hyun Perez   presented 01/28/2022  for clinical monitoring of self administration of Spravato. The patient used a total of 3 devices, self-administered intranasally, with a 5-minute rest between each device.    Each device was checked by klb for appropriate expiration and that 2 green indicator dots were present prior to patient administration. Afterwards, the device was checked by klb to ensure the green indicator dots were gone and the full medication was delivered.    Patient check at 1:38pm with BP reading of 120/82  Patient check at 2:58pm with BP reading of 130/87    End time of observation: 2:58pm    Patient monitored for 2 hours Patient tolerated the procedure well without complications..       NDC [id number]  LOT [id number]  EXP 2023 Aug

## 2022-01-28 NOTE — PROGRESS NOTES
"Subjective   Hyun Perez is a 42 y.o. female who presents today for Spravato tx #14    Chief Complaint:  Treatment Resistant Depression    History of Present Illness:  Prior to Spravato:  -Hx of tx resistent depression failing numerous medications over many years, and insomnia.  -Significant FmHx of successful SA in mother, grandmother and extended family. Her mother passed away early 2021.   -FmHx of bipolar d/o w/o diagnosis herself. Admits to 9 possibly manic symptoms on MDQ.  -On 300mg Wellbutrin daily for depression with little to no efficacy.  -Struggles with SI daily without a plan or anticipating actions.  -Vraylar caused minimal improvements and worsened irritability, also caused oversedating in the day.  -Been reluctant to counseling due to being an atheist and fearing discrimination and judgement.    1/10/22: Today the pt still reported feeling depressed, last session was 3 days ago, depression is  rated as 7-8/10, but she noticed that now she has days when she does not have SI, more days when she is able to get distracted .  The pt c/o decreased energy level, poor motivations   She always feels more depressed this time of the year . The pt denied AVH/SI/HI, did not bring up any delusional content for the discussion, she enjoys time spending with her dogs     1/14/22: Pt reports continuing to notice improvement in her depression, but continues to have incomplete efficacy. Effects seem to last from one visit to the next on the current dose, but continues to have depression.  -Reports continuing to tolerate treatments with no perceived side effects.  -Says she wants to stop Lamictal because of night sweats and sugar cravings, and her continued anxiety surrounding side effects from medications.  -Denies AVH or HI, but continues to have occasional SI that are less frequent.    1/17/21: Pt reports small improvements with Spravato. She would now like to stop trazodone because it makes her feel \"foggy\" and " "tired, but she is still taking 100mg Lamictal with continued plans to stop the medication due to night sweats and sugar cravings. She denies any extreme anxiety recently, and continued depression. Pt would like to consider trying another mood stabilizer Mckenzie told her would help with wt loss. Denies side effects from Spravato, AVH, or HI, but continues to have occasional SI that are less frequent.    1/24/22: Felt better the past week than she has in a long time, and pt contributes this to Spravato. On 50mg Lamictal for one week now and planning to stop. Pt is willing to try Topomax since it is more likely to help her with weight loss. Lamictal did seem helpful for her, but the night sweats are too difficult. Continues to c/o difficulty finding motivation to fill her pill box and do chores around her home, but has been able to do some lately. Denies anxiety for \"a while now,\" and gave an example that she previously was almost agoraphobic not wanting to leave her house and having extreme anxiety while driving. Driving has not been troublesome for her recently.    1/28/22: Pt says she had a couple bad days of depression since her last treatment because she was thinking about her dog and how she may have failed him by being so depressed she didn't notice he was sick before he passed away. Continues to feel like anxiety is well controlled. Pt has started taking Topomax for mood stabilization and wt loss for the past 2 days, and she is tolerating well so far. She continues to feel improvement with Spravato tx overall and believes it is helping her depression. Admits to some continued SI w/o plans or anticipated actions, but denies AVH or HI.    The following portions of the patient's history were reviewed and updated as appropriate: allergies, current medications, past family history, past medical history, past social history, past surgical history and problem list.    PAST PSYCHIATRIC HISTORY  Diagnosis " "treated:  Affective/Bipolar Disorder, Anxiety/Panic Disorder, ADHD dx in childhood.  Treatment Type:  Medication Management  Prior Psychiatric Medications:  Bupropion - takes edge off a little a bit.  prozac - inefficacy.  depakote - inefficacy.  seroquel - inefficacy.  zoloft - inefficacy.  paxil - inefficacy.  Duloxetine - inefficacy.  3mg Vraylar - oversedating and caused sugar cravings.  Trazodone - effective at 100mg nightly; makes her feel \"foggy\" and tired.  Lamictal - night sweats and sugar cravings.  Spravato  *Others the patient cannot recall the names of.  Support Groups:  None  Sequelae Of Mental Disorder:  job disruption, social isolation, family disruption, financial hardships, emotional distress.    Interval History  No change since last visit.    Side Effects  Denies from Topamax or Spravato.    Past Medical History:  Past Medical History:   Diagnosis Date   • Anxiety      Social History:  Social History     Socioeconomic History   • Marital status: Single   Tobacco Use   • Smoking status: Former Smoker   • Smokeless tobacco: Never Used   Vaping Use   • Vaping Use: Never used   Substance and Sexual Activity   • Alcohol use: Yes     Comment: occ wine   • Drug use: Not Currently   • Sexual activity: Defer     Family History:  Family History   Problem Relation Age of Onset   • Hypertension Mother    • Coronary artery disease Mother         CABG x4 in late 30s   • Depression Mother         mother committed suicide   • Hypertension Maternal Grandmother    • Diabetes Maternal Grandmother    • Depression Maternal Grandmother         committed suicide   • Hypertension Maternal Grandfather    • Diabetes Maternal Grandfather    • Heart attack Maternal Grandfather    • Depression Maternal Cousin         committed suicide   • Depression Maternal Aunt         committed suicide     Past Surgical History:  Past Surgical History:   Procedure Laterality Date   • LEEP  1997   • TUBAL ABDOMINAL LIGATION Bilateral 2015 "     Problem List:  Patient Active Problem List   Diagnosis   • Seasonal allergies   • Reduced libido   • Class 2 severe obesity due to excess calories with serious comorbidity and body mass index (BMI) of 38.0 to 38.9 in adult ([facility])   • Severe episode of recurrent major depressive disorder, without psychotic features ([facility])   • Essential hypertension   • Hot flashes due to menopause   • Generalized anxiety disorder with panic attacks   • Varicose veins of lower extremity   • Mixed hyperlipidemia   • Chest pain   • Epigastric pain   • Diffuse arthralgia   • Other insomnia   • Bipolar II disorder ([facility])     Allergy:   Allergies   Allergen Reactions   • Banana Nausea Only   • Kiwi Extract Nausea Only   • Penicillin V Potassium Swelling   • Milk Thistle Other (See Comments)      Discontinued Medications:  There are no discontinued medications.   Current Medications:   Current Outpatient Medications   Medication Sig Dispense Refill   • allopurinol (Zyloprim) 100 MG tablet Take 1 tablet by mouth 2 (Two) Times a Day. 180 tablet 1   • atorvastatin (LIPITOR) 20 MG tablet Take 1 tablet by mouth Every Night. 90 tablet 1   • buPROPion XL (Wellbutrin XL) 300 MG 24 hr tablet Take 1 tablet by mouth Daily. 90 tablet 1   • celecoxib (CeleBREX) 200 MG capsule Take 1 capsule by mouth 2 (Two) Times a Day As Needed for Moderate Pain . Take with food! 60 capsule 5   • cetirizine (zyrTEC) 10 MG tablet Take 1 tablet by mouth Every Night. 90 tablet 1   • Esketamine HCl, 84 MG Dose, Nasal Solution 6 sprays into the nostril(s) as directed by provider 2 (Two) Times a Week. 2 each 3   • fluticasone (FLONASE) 50 MCG/ACT nasal spray 1 spray into the nostril(s) as directed by provider Daily As Needed for Rhinitis or Allergies. DO NOT SNIFF! 48 g 1   • lisinopril (PRINIVIL,ZESTRIL) 10 MG tablet TAKE 1 TABLET BY MOUTH DAILY 90 tablet 1   • montelukast (SINGULAIR) 10 MG tablet      • omeprazole (priLOSEC) 40 MG capsule Take 40 mg by mouth Daily.     •  topiramate (Topamax) 25 MG tablet Take 1 tablet by mouth Every Night for 7 days, THEN 1 tablet 2 (Two) Times a Day for 7 days, THEN 2 tablets Every Night for 14 days. 49 tablet 0   • traZODone (DESYREL) 100 MG tablet TAKE 1 TABLET BY MOUTH EVERY NIGHT 30 tablet 0     Current Facility-Administered Medications   Medication Dose Route Frequency Provider Last Rate Last Admin   • Esketamine HCl (84 MG Dose) Nasal Solution 84 mg  84 mg Nasal Once per day on Mon Thu Jaleel Byers PA-C   84 mg at 01/28/22 1520     Psychological ROS: positive for - anxiety, concentration difficulties, depression, irritability, memory difficulties, mood swings, sleep disturbances and suicidal ideation. All improving except for difficulty with concentration and focus.  negative for - disorientation, hallucinations or hostility    Physical Exam:  BP Prior to tx: 115/78  BP 40 min into tx: 120/82  BP prior to discharge: 130/87    Start time of observation: 12:54pm  End time of observation: 2:58pm    Mental Status Exam:   Orientation:  To person, Place, Time and Situation  Memory: Recent and remote memory Intact  Mood/Affect: Depressed  Suicidal Ideations: SI infrequent and pt denies plans or anticipated actions.  Homicidal Ideations:  None  Hallucinations: None  Delusions:  None  Obsessions: None  Behavior and Psychomotor Activity: Appropriate  Speech:  Normal but minimal  Thought Process: Goal directed  Associations: Intact  Thought Content: mood congruent   Language: name objects and repeat phrases  Concentration and computation: Fair  Attention Span: Fair  Fund of Knowledge: Fair  Reliability: fair  Insight into mental health: Fair  Judgement: Fair  Impulse Control:  Fair  Hygiene: Good  Cooperation:  Cooperative  Eye Contact:  Fair  Physical/Medical Issues:  Yes HLD, obesity, HTN, GERD.     MSE reviewed and accepted w/o needed changes from the previous visit.    PHQ-9 Depression Screening:   Little interest or pleasure in doing  "things? 1   Feeling down, depressed, or hopeless? 2   Trouble falling or staying asleep, or sleeping too much? 2   Feeling tired or having little energy? 2   Poor appetite or overeating? 1   Feeling bad about yourself - or that you are a failure or have let yourself or your family down? 3   Trouble concentrating on things, such as reading the newspaper or watching television? 3   Moving or speaking so slowly that other people could have noticed? Or the opposite - being so fidgety or restless that you have been moving around a lot more than usual? 1   Thoughts that you would be better off dead, or of hurting yourself in some way? 1   PHQ-9 Total Score 16   If you checked off any problems, how difficult have these problems made it for you to do your work, take care of things at home, or get along with other people? Somewhat difficult   Feeling nervous, anxious or on edge: Several days  Not being able to stop or control worrying: Not at all  Worrying too much about different things: Not at all  Trouble Relaxing: Several days  Being so restless that it is hard to sit still: Several days  Feeling afraid as if something awful might happen: Not at all  Becoming easily annoyed or irritable: Several days  ISIDRO 7 Total Score: 4  If you checked any problems, how difficult have these problems made it for you to do your work, take care of things at home, or get along with other people: Somewhat difficult  PHQ-9: 16; moderately-severe depression. Previously 15...20...20s prior to Spravato.  ISIDRO-7: 4; anxiety in remission. Previously 7...3 and in the 20s prior to Spravato.  MDQ: Negative; but likely positive. Only negative due to \"minor problem\" and unclear if manic sx occurred together. 9 manic sx on MDQ. (10/18/21)    Former Smoker  I advised Hyun of the risks of tobacco use.     Assessment/Plan   Diagnoses and all orders for this visit:    1. Severe episode of recurrent major depressive disorder, without psychotic features " "([facility]) (Primary)    2. Generalized anxiety disorder with panic attacks    3. Other insomnia    4. Class 2 severe obesity due to excess calories with serious comorbidity and body mass index (BMI) of 38.0 to 38.9 in adult ([facility])       PHQ-9: 15; moderately-severe depression. Previously 20...20s prior to Spravato.  ISIDRO-7: 7; mild anxiety. Previously 3 and in the 20s prior to Spravato.  MDQ: Negative; but likely positive. Only negative due to \"minor problem\" and unclear if manic sx occurred together. 9 manic sx on MDQ. (10/18/21)  -Cont. Topamax titration for mood stabilization, depression, and wt loss. New Rx will be sent in the future to continue the med at 50mg bid.  -Cont 84mg Spravato twice weekly due to continued benefit.  -Cont. 300mg Wellbutin daily for depression.  -Counseling continues to be encouraged, but pt says she is going to reach back out to the counselor she chose but hasn't seen yet.  -F/U in 3 days, for next Spravato tx. See if pt got back in touch with atheist friendly therapist, monitor improvement with Spravato, and see if Topomax continues to be tolerated.    -In the future, continue to monitor cholesterol, sodium, and wt.    Visit Diagnoses:    ICD-10-CM ICD-9-CM   1. Severe episode of recurrent major depressive disorder, without psychotic features ([facility])  F33.2 296.33   2. Generalized anxiety disorder with panic attacks  F41.1 300.02    F41.0 300.01   3. Other insomnia  G47.09 780.52   4. Class 2 severe obesity due to excess calories with serious comorbidity and body mass index (BMI) of 38.0 to 38.9 in adult ([facility])  E66.01 278.01    Z68.38 V85.38      TREATMENT PLAN/GOALS: In the short-term, the patient is to continue supportive psychotherapy efforts and medications as indicated. Treatment and medication options were discussed during today's visit. Long-term the goal will be to control symptoms so they do not negatively interfere with other aspects of the patient's life. Prognosis is optimistic " with implementation of the current treatment plan. Patient ackowledged and verbally consented to the treatment plan and was educated on the importance of compliance with treatment and follow-up appointments.    MEDICATION ISSUES:  INSPECT reviewed as expected. On 84mg dose pack of Spravato.     MEDS ORDERED DURING VISIT:  No orders of the defined types were placed in this encounter.    Return in about 3 days (around 1/31/2022) for Next scheduled follow up.     I, Jaleel Byers PA-C, was present in the office suite and immediately available to furnish assistance and direction throughout the entire observation time.     Patient tolerated the procedure well without complications..     Side effects of treatment were mild and included sedation, dizziness, mild euphoria.    After the observation time, the patient was assessed by me and considered stable to leave the office with assistance. Hyun Perez  was advised not to drive or operate machinery until tomorrow following a full night's sleep.    Jaleel Byers PA-C  01/28/22   12:26 EST      This document has been electronically signed by Jaleel Byers PA-C  January 28, 2022 18:31 EST    EMR Dragon transcription disclaimer:  Part of this note may be an electronic transcription/translation of spoken language to printed text using the Dragon Dictation System.

## 2022-01-31 ENCOUNTER — OFFICE VISIT (OUTPATIENT)
Dept: PSYCHIATRY | Facility: CLINIC | Age: 42
End: 2022-01-31

## 2022-01-31 DIAGNOSIS — F41.1 GENERALIZED ANXIETY DISORDER WITH PANIC ATTACKS: Chronic | ICD-10-CM

## 2022-01-31 DIAGNOSIS — F41.0 GENERALIZED ANXIETY DISORDER WITH PANIC ATTACKS: Chronic | ICD-10-CM

## 2022-01-31 DIAGNOSIS — F33.2 SEVERE EPISODE OF RECURRENT MAJOR DEPRESSIVE DISORDER, WITHOUT PSYCHOTIC FEATURES: Primary | Chronic | ICD-10-CM

## 2022-01-31 PROCEDURE — 99415 PROLNG CLIN STAFF SVC 1ST HR: CPT

## 2022-01-31 PROCEDURE — 99215 OFFICE O/P EST HI 40 MIN: CPT

## 2022-01-31 RX ORDER — LAMOTRIGINE 50 MG/1
50 TABLET, EXTENDED RELEASE ORAL EVERY MORNING
Qty: 30 TABLET | Refills: 1 | Status: SHIPPED | OUTPATIENT
Start: 2022-01-31 | End: 2022-02-24 | Stop reason: SINTOL

## 2022-01-31 RX ORDER — AMITRIPTYLINE HYDROCHLORIDE 25 MG/1
25 TABLET, FILM COATED ORAL NIGHTLY
Qty: 30 TABLET | Refills: 1 | Status: SHIPPED | OUTPATIENT
Start: 2022-01-31 | End: 2022-02-07 | Stop reason: ALTCHOICE

## 2022-02-01 DIAGNOSIS — F33.2 SEVERE EPISODE OF RECURRENT MAJOR DEPRESSIVE DISORDER, WITHOUT PSYCHOTIC FEATURES: Chronic | ICD-10-CM

## 2022-02-02 RX ORDER — ESKETAMINE HYDROCHLORIDE 28 MG/.2ML
SOLUTION NASAL
Qty: 12 EACH | Refills: 0 | Status: SHIPPED | OUTPATIENT
Start: 2022-02-02 | End: 2022-02-22

## 2022-02-07 ENCOUNTER — OFFICE VISIT (OUTPATIENT)
Dept: PSYCHIATRY | Facility: CLINIC | Age: 42
End: 2022-02-07

## 2022-02-07 DIAGNOSIS — F33.2 SEVERE EPISODE OF RECURRENT MAJOR DEPRESSIVE DISORDER, WITHOUT PSYCHOTIC FEATURES: Primary | Chronic | ICD-10-CM

## 2022-02-07 DIAGNOSIS — F41.1 GENERALIZED ANXIETY DISORDER WITH PANIC ATTACKS: Chronic | ICD-10-CM

## 2022-02-07 DIAGNOSIS — F41.0 GENERALIZED ANXIETY DISORDER WITH PANIC ATTACKS: Chronic | ICD-10-CM

## 2022-02-07 PROCEDURE — 99415 PROLNG CLIN STAFF SVC 1ST HR: CPT

## 2022-02-07 PROCEDURE — 99215 OFFICE O/P EST HI 40 MIN: CPT

## 2022-02-07 RX ORDER — TRAZODONE HYDROCHLORIDE 100 MG/1
100 TABLET ORAL DAILY
COMMUNITY
Start: 2021-12-05 | End: 2022-03-28

## 2022-02-07 NOTE — PROGRESS NOTES
Spravato Monitoring Note    Start time of observation: 1:19 PM  BP check prior to administration: 126/80    Hyun Ana   presented 02/07/2022  for clinical monitoring of self administration of Spravato. The patient used a total of 3 devices, self-administered intranasally, with a 5-minute rest between each device.    Each device was checked by AFP for appropriate expiration and that 2 green indicator dots were present prior to patient administration. Afterwards, the device was checked by AFP to ensure the green indicator dots were gone and the full medication was delivered.    Patient check at 1:59 PM with BP reading of 122/81  Patient check at 3:19 PM with BP reading of 136/82    End time of observation: 3:25 PM    Patient monitored for 2 hours Patient tolerated the procedure well without complications..       NDC [id number]  LOT [id number]  EXP 08/31/2023

## 2022-02-07 NOTE — PROGRESS NOTES
Subjective   Hyun Perez is a 42 y.o. female who presents today for Spravato tx #16    Chief Complaint:  Treatment Resistant Depression    History of Present Illness:  Prior to Spravato:  -Hx of tx resistent depression failing numerous medications over many years, and insomnia.  -Significant FmHx of successful SA in mother, grandmother and extended family. Her mother passed away early 2021.   -FmHx of bipolar d/o w/o diagnosis herself. Admits to 9 possibly manic symptoms on MDQ.  -On 300mg Wellbutrin daily for depression with little to no efficacy.  -Struggles with SI daily without a plan or anticipating actions.  -Vraylar caused minimal improvements and worsened irritability, also caused oversedating in the day.  -Been reluctant to counseling due to being an atheist and fearing discrimination and judgement.    1/10-28/22: See previous notes.    1/31/22: Pt reports worsening depression over the past few days. Didn't get out of bed and has difficulty with feeling hopeless with frequent SI. The worsening of these sxs has developed within a short time of starting Topamax as an alternative mood stabilizer to Lamictal. She has not completely stopped Lamictal yet today. She believes Spravato has been beneficial overall. Denies HI, AVH, or plans/anticipated actions from her SI.  -Anxiety is remaining well controlled from Spravato tx.  -She is willing to add Amitriptyline to try and combat her excessive night sweats with Lamictal, which was the only problem she noticed with that medication.  -Denies taking Trazodone lately for sleep.    2/7/22: The pt reports significant improvement in her Depression, and believes it was because she stopped Singulair, when she saw people had gotten SI and depression with the drug to the point a black box warning was added by the FDA. She never started Lamotrigine with Amitriptyline, and continued Topomax, and SI has not continued with these changes. However, occasional suicidal thoughts  "still occur at a rate prior to her recent episodes of severe depression with frequent SI. Denies HI or AVH, and believes Spravato has continued benefit with tolerability.    The following portions of the patient's history were reviewed and updated as appropriate: allergies, current medications, past family history, past medical history, past social history, past surgical history and problem list.    PAST PSYCHIATRIC HISTORY  Diagnosis treated:  Affective/Bipolar Disorder, Anxiety/Panic Disorder, ADHD dx in childhood.  Treatment Type:  Medication Management  Prior Psychiatric Medications:  Bupropion - takes edge off a little a bit.  prozac - inefficacy.  depakote - inefficacy.  seroquel - inefficacy.  zoloft - inefficacy.  paxil - inefficacy.  Duloxetine - inefficacy.  3mg Vraylar - oversedating and caused sugar cravings.  Trazodone - effective at 100mg nightly; makes her feel \"foggy\" and tired.  Lamictal - night sweats and sugar cravings.  Singulair - dep and SI  Spravato  *Others the patient cannot recall the names of.  Support Groups:  None  Sequelae Of Mental Disorder:  job disruption, social isolation, family disruption, financial hardships, emotional distress.    Interval History  Improved since stopping Singulaire    Side Effects  Denies from Spravato or continuing Topamax.  Singulair - dep and SI    Past Medical History:  Past Medical History:   Diagnosis Date   • Anxiety      Social History:  Social History     Socioeconomic History   • Marital status: Single   Tobacco Use   • Smoking status: Former Smoker   • Smokeless tobacco: Never Used   Vaping Use   • Vaping Use: Never used   Substance and Sexual Activity   • Alcohol use: Yes     Comment: occ wine   • Drug use: Not Currently   • Sexual activity: Defer     Family History:  Family History   Problem Relation Age of Onset   • Hypertension Mother    • Coronary artery disease Mother         CABG x4 in late 30s   • Depression Mother         mother committed " suicide   • Hypertension Maternal Grandmother    • Diabetes Maternal Grandmother    • Depression Maternal Grandmother         committed suicide   • Hypertension Maternal Grandfather    • Diabetes Maternal Grandfather    • Heart attack Maternal Grandfather    • Depression Maternal Cousin         committed suicide   • Depression Maternal Aunt         committed suicide     Past Surgical History:  Past Surgical History:   Procedure Laterality Date   • LEEP  1997   • TUBAL ABDOMINAL LIGATION Bilateral 2015     Problem List:  Patient Active Problem List   Diagnosis   • Seasonal allergies   • Reduced libido   • Class 2 severe obesity due to excess calories with serious comorbidity and body mass index (BMI) of 38.0 to 38.9 in adult ([facility])   • Severe episode of recurrent major depressive disorder, without psychotic features ([facility])   • Essential hypertension   • Hot flashes due to menopause   • Generalized anxiety disorder with panic attacks   • Varicose veins of lower extremity   • Mixed hyperlipidemia   • Chest pain   • Epigastric pain   • Diffuse arthralgia   • Other insomnia   • Bipolar II disorder ([facility])     Allergy:   Allergies   Allergen Reactions   • Banana Nausea Only   • Kiwi Extract Nausea Only   • Penicillin V Potassium Swelling   • Milk Thistle Other (See Comments)      Discontinued Medications:  Medications Discontinued During This Encounter   Medication Reason   • amitriptyline (ELAVIL) 25 MG tablet Alternate therapy      Current Medications:   Current Outpatient Medications   Medication Sig Dispense Refill   • traZODone (DESYREL) 100 MG tablet Take 100 mg by mouth Daily.     • triamcinolone (KENALOG) 0.1 % ointment Apply 0.1 application topically to the appropriate area as directed 2 (Two) Times a Day.     • allopurinol (Zyloprim) 100 MG tablet Take 1 tablet by mouth 2 (Two) Times a Day. 180 tablet 1   • atorvastatin (LIPITOR) 20 MG tablet Take 1 tablet by mouth Every Night. 90 tablet 1   • buPROPion XL  (Wellbutrin XL) 300 MG 24 hr tablet Take 1 tablet by mouth Daily. 90 tablet 1   • celecoxib (CeleBREX) 200 MG capsule Take 1 capsule by mouth 2 (Two) Times a Day As Needed for Moderate Pain . Take with food! 60 capsule 5   • cetirizine (zyrTEC) 10 MG tablet Take 1 tablet by mouth Every Night. 90 tablet 1   • fluticasone (FLONASE) 50 MCG/ACT nasal spray 1 spray into the nostril(s) as directed by provider Daily As Needed for Rhinitis or Allergies. DO NOT SNIFF! 48 g 1   • lamoTRIgine ER 50 MG tablet sustained-release 24 hour Take 1 tablet by mouth Every Morning. 30 tablet 1   • lisinopril (PRINIVIL,ZESTRIL) 10 MG tablet TAKE 1 TABLET BY MOUTH DAILY 90 tablet 1   • montelukast (SINGULAIR) 10 MG tablet      • omeprazole (priLOSEC) 40 MG capsule Take 40 mg by mouth Daily.     • Spravato, 84 MG Dose, Nasal Solution ADMINISTER 84MG INTRANASALLY TWICE WEEKLY 12 each 0     Current Facility-Administered Medications   Medication Dose Route Frequency Provider Last Rate Last Admin   • Esketamine HCl (84 MG Dose) Nasal Solution 84 mg  84 mg Nasal Once per day on Mon Thu Jaleel Byers PA-C   84 mg at 02/11/22 1259     Psychological ROS: positive for - anxiety, concentration difficulties, depression, irritability, memory difficulties, mood swings, sleep disturbances and suicidal ideation.   negative for - disorientation, hallucinations or hostility    Physical Exam:  BP Prior to tx: 126/80  BP 40 min into tx: 122/81  BP prior to discharge: 136/82    Start time of observation: 1:19 PM  End time of observation: 3:25 PM    Mental Status Exam:   Orientation:  To person, Place, Time and Situation  Memory: Recent and remote memory Intact  Mood/Affect: Depressed  Suicidal Ideations: SI infrequent and pt denies plans or anticipated actions.  Homicidal Ideations:  None  Hallucinations: None  Delusions:  None  Obsessions: None  Behavior and Psychomotor Activity: Appropriate  Speech:  Normal  Thought Process: Goal  directed  Associations: Intact  Thought Content: mood congruent   Language: name objects and repeat phrases  Concentration and computation: Fair  Attention Span: Fair  Fund of Knowledge: Fair  Reliability: fair  Insight into mental health: Fair  Judgement: Fair  Impulse Control:  Fair  Hygiene: Good  Cooperation:  Cooperative  Eye Contact:  Good  Physical/Medical Issues:  Yes HLD, obesity, HTN, GERD.     MSE reviewed and accepted changes from the previous visit.    PHQ-9 Depression Screening:   Little interest or pleasure in doing things? 1   Feeling down, depressed, or hopeless? 1   Trouble falling or staying asleep, or sleeping too much? 1   Feeling tired or having little energy? 1   Poor appetite or overeating? 1   Feeling bad about yourself - or that you are a failure or have let yourself or your family down? 1   Trouble concentrating on things, such as reading the newspaper or watching television? 2   Moving or speaking so slowly that other people could have noticed? Or the opposite - being so fidgety or restless that you have been moving around a lot more than usual? 2   Thoughts that you would be better off dead, or of hurting yourself in some way? 1   PHQ-9 Total Score 11   If you checked off any problems, how difficult have these problems made it for you to do your work, take care of things at home, or get along with other people? Somewhat difficult   Feeling nervous, anxious or on edge: Several days  Not being able to stop or control worrying: Several days  Worrying too much about different things: Several days  Trouble Relaxing: More than half the days  Being so restless that it is hard to sit still: Several days  Feeling afraid as if something awful might happen: Not at all  Becoming easily annoyed or irritable: Not at all  ISIDRO 7 Total Score: 6  If you checked any problems, how difficult have these problems made it for you to do your work, take care of things at home, or get along with other people:  "Somewhat difficult  PHQ-9: 11; moderate depression in partial remission. Previously: 27...16....15...20...20s prior to Spravato.  ISIDRO-7: 6; mild anxiety. Previously 4...7...3....20s prior to Spravato.  MDQ: Negative; but likely positive. Negative due to \"minor problem\" and unclear if 9 manic sx occurred together. (10/18/21)    Former Smoker  I advised Hyun of the risks of tobacco use.     Assessment/Plan   Diagnoses and all orders for this visit:    1. Severe episode of recurrent major depressive disorder, without psychotic features (HCC) (Primary)  -     Esketamine HCl (84 MG Dose) Nasal Solution 84 mg    2. Generalized anxiety disorder with panic attacks    PHQ-9: 11; moderate depression in partial remission. Previously: 27...16....15...20...20s prior to Spravato.  ISIDRO-7: 6; mild anxiety. Previously 4...7...3....20s prior to Spravato.  MDQ: Negative; but likely positive. Negative due to \"minor problem\" and unclear if 9 manic sx occurred together. (10/18/21)    -d/c Amitriptyline and Lamotrigine due to pt wanting alternative therapy.  -Cont. 100mg Trazodone restarted by other provider.  -Cont. Topomax, will increase to 50mg once daily and then twice daily in the near future. Pt suppose to tell me when she is ready for that change with a new Rx.  -Cont. 300mg Wellbutin daily for depression.  -Counseling continues to be encouraged.  -F/U in 4 days for next Spravato tx. See if pt got back in touch with atheist friendly therapist, monitor improvement with Spravato and topomax.    -Cont 84mg Spravato twice weekly due to continued benefit on depression/anxiety.    -In the future, continue to monitor cholesterol, sodium, and wt.    Visit Diagnoses:    ICD-10-CM ICD-9-CM   1. Severe episode of recurrent major depressive disorder, without psychotic features (HCC)  F33.2 296.33   2. Generalized anxiety disorder with panic attacks  F41.1 300.02    F41.0 300.01      TREATMENT PLAN/GOALS: In the short-term, the patient is to " continue supportive psychotherapy efforts and medications as indicated. Treatment and medication options were discussed during today's visit. Long-term the goal will be to control symptoms so they do not negatively interfere with other aspects of the patient's life. Prognosis is optimistic with implementation of the current treatment plan. Patient ackowledged and verbally consented to the treatment plan and was educated on the importance of compliance with treatment and follow-up appointments.    MEDICATION ISSUES:  INSPECT reviewed as expected. On 84mg dose pack of Spravato.     MEDS ORDERED DURING VISIT:  New Medications Ordered This Visit   Medications   • Esketamine HCl (84 MG Dose) Nasal Solution 84 mg     Return in about 4 days (around 2/11/2022) for Recheck.     I, Jaleel Byers PA-C, was present in the office suite and immediately available to furnish assistance and direction throughout the entire observation time.     Patient tolerated the procedure well without complications..     Side effects of treatment were mild and included sedation, dizziness, mild euphoria.    After the observation time, the patient was assessed by me and considered stable to leave the office with assistance. Hyun Perez  was advised not to drive or operate machinery until tomorrow following a full night's sleep.    Jaleel Byers PA-C  02/11/22   12:26 EST      This document has been electronically signed by Jaleel Byers PA-C  February 11, 2022 19:24 EST    EMR Dragon transcription disclaimer:  Part of this note may be an electronic transcription/translation of spoken language to printed text using the Dragon Dictation System.

## 2022-02-11 ENCOUNTER — OFFICE VISIT (OUTPATIENT)
Dept: PSYCHIATRY | Facility: CLINIC | Age: 42
End: 2022-02-11

## 2022-02-11 DIAGNOSIS — F41.0 GENERALIZED ANXIETY DISORDER WITH PANIC ATTACKS: Chronic | ICD-10-CM

## 2022-02-11 DIAGNOSIS — F41.1 GENERALIZED ANXIETY DISORDER WITH PANIC ATTACKS: Chronic | ICD-10-CM

## 2022-02-11 DIAGNOSIS — F33.2 SEVERE EPISODE OF RECURRENT MAJOR DEPRESSIVE DISORDER, WITHOUT PSYCHOTIC FEATURES: Primary | Chronic | ICD-10-CM

## 2022-02-11 DIAGNOSIS — G47.09 OTHER INSOMNIA: ICD-10-CM

## 2022-02-11 PROCEDURE — 99215 OFFICE O/P EST HI 40 MIN: CPT

## 2022-02-11 PROCEDURE — 99415 PROLNG CLIN STAFF SVC 1ST HR: CPT

## 2022-02-11 NOTE — PROGRESS NOTES
Spravato Monitoring Note    Start time of observation: 12:59 pm  BP check prior to administration: 115/79    Hyun Perez   presented 02/11/2022  for clinical monitoring of self administration of Spravato. The patient used a total of 3 devices, self-administered intranasally, with a 5-minute rest between each device.    Each device was checked by AFP for appropriate expiration and that 2 green indicator dots were present prior to patient administration. Afterwards, the device was checked by AFP to ensure the green indicator dots were gone and the full medication was delivered.    Patient check at 1:39 PM with BP reading of 117/79  Patient check at 2:59 PM with BP reading of 117/81    End time of observation: 3:15 PM    Patient monitored for 2 hours Patient tolerated the procedure well without complications..       NDC [id number]  LOT [id number]  EXP 08/31/2023

## 2022-02-12 NOTE — PROGRESS NOTES
Subjective   Hyun Perez is a 42 y.o. female who presents today for Spravato tx #17    Chief Complaint:  Treatment Resistant Depression    History of Present Illness:  Prior to Spravato:  -Hx of tx resistent depression failing numerous medications over many years, and insomnia.  -Significant FmHx of successful SA in mother, grandmother and extended family. Her mother passed away early 2021.   -FmHx of bipolar d/o w/o diagnosis herself. Admits to 9 possibly manic symptoms on MDQ.  -On 300mg Wellbutrin daily for depression with little to no efficacy.  -Struggles with SI daily without a plan or anticipating actions.  -Vraylar caused minimal improvements and worsened irritability, also caused oversedating in the day.  -Been reluctant to counseling due to being an atheist and fearing discrimination and judgement.  -Not been using Trazodone.    1/10-31/22: See previous notes.    2/7/22: The pt reports significant improvement in her Depression, and believes it was because she stopped Singulair, when she saw people had gotten SI and depression with the drug to the point a black box warning was added by the FDA. She never started Lamotrigine with Amitriptyline, and continued Topomax, and SI has not continued with these changes. However, occasional suicidal thoughts still occur at a rate prior to her recent episodes of severe depression with frequent SI. Denies HI or AVH, and believes Spravato has continued benefit with tolerability.    2/11/22: Pt presents stating spravato continues to be a benefit, but she has noticed no major changes since the past tx in anx/dep.  -Continues to feel stopping Singulair helped depression.  -Currently on 50mg Topamax daily with no noticed changes in dep/anx yet with the medication. Pt is to let me know when she is ready to go up to 50mg BID with a new Rx.  -Reports Spravato tx sometimes makes her tired and others seems to give her energy. She also feels like the tx lasts btw treatments  "sometimes but not others.  -Denies sugar cravings continuing since stopping lamotrigine and switching to topomax. Never started amitriptyline.  -Admits to occasional SI w/o plans or anticipated actions, but denies HI or AVH.    The following portions of the patient's history were reviewed and updated as appropriate: allergies, current medications, past family history, past medical history, past social history, past surgical history and problem list.    PAST PSYCHIATRIC HISTORY  Diagnosis treated:  Affective/Bipolar Disorder, Anxiety/Panic Disorder, ADHD dx in childhood.  Treatment Type:  Medication Management  Prior Psychiatric Medications:  Bupropion - takes edge off a little a bit.  prozac - inefficacy.  depakote - inefficacy.  seroquel - inefficacy.  zoloft - inefficacy.  paxil - inefficacy.  Duloxetine - inefficacy.  3mg Vraylar - oversedating and caused sugar cravings.  Trazodone - effective at 100mg nightly; makes her feel \"foggy\" and tired.  Lamictal - night sweats and sugar cravings.  Singulair - dep and SI  Spravato  *Others the patient cannot recall the names of.  Support Groups:  None  Sequelae Of Mental Disorder:  job disruption, social isolation, family disruption, financial hardships, emotional distress.    Interval History  No change since last visit, but overall improvement continues.    Side Effects  Denies from Spravato or continuing Topamax.    Past Medical History:  Past Medical History:   Diagnosis Date   • Anxiety      Social History:  Social History     Socioeconomic History   • Marital status: Single   Tobacco Use   • Smoking status: Former Smoker   • Smokeless tobacco: Never Used   Vaping Use   • Vaping Use: Never used   Substance and Sexual Activity   • Alcohol use: Yes     Comment: occ wine   • Drug use: Not Currently   • Sexual activity: Defer     Family History:  Family History   Problem Relation Age of Onset   • Hypertension Mother    • Coronary artery disease Mother         CABG x4 in " late 30s   • Depression Mother         mother committed suicide   • Hypertension Maternal Grandmother    • Diabetes Maternal Grandmother    • Depression Maternal Grandmother         committed suicide   • Hypertension Maternal Grandfather    • Diabetes Maternal Grandfather    • Heart attack Maternal Grandfather    • Depression Maternal Cousin         committed suicide   • Depression Maternal Aunt         committed suicide     Past Surgical History:  Past Surgical History:   Procedure Laterality Date   • LEEP  1997   • TUBAL ABDOMINAL LIGATION Bilateral 2015     Problem List:  Patient Active Problem List   Diagnosis   • Seasonal allergies   • Reduced libido   • Class 2 severe obesity due to excess calories with serious comorbidity and body mass index (BMI) of 38.0 to 38.9 in adult ([facility])   • Severe episode of recurrent major depressive disorder, without psychotic features ([facility])   • Essential hypertension   • Hot flashes due to menopause   • Generalized anxiety disorder with panic attacks   • Varicose veins of lower extremity   • Mixed hyperlipidemia   • Chest pain   • Epigastric pain   • Diffuse arthralgia   • Other insomnia   • Bipolar II disorder ([facility])     Allergy:   Allergies   Allergen Reactions   • Banana Nausea Only   • Kiwi Extract Nausea Only   • Penicillin V Potassium Swelling   • Milk Thistle Other (See Comments)      Discontinued Medications:  There are no discontinued medications.   Current Medications:   Current Outpatient Medications   Medication Sig Dispense Refill   • allopurinol (Zyloprim) 100 MG tablet Take 1 tablet by mouth 2 (Two) Times a Day. 180 tablet 1   • atorvastatin (LIPITOR) 20 MG tablet Take 1 tablet by mouth Every Night. 90 tablet 1   • buPROPion XL (Wellbutrin XL) 300 MG 24 hr tablet Take 1 tablet by mouth Daily. 90 tablet 1   • celecoxib (CeleBREX) 200 MG capsule Take 1 capsule by mouth 2 (Two) Times a Day As Needed for Moderate Pain . Take with food! 60 capsule 5   • cetirizine (zyrTEC)  10 MG tablet Take 1 tablet by mouth Every Night. 90 tablet 1   • fluticasone (FLONASE) 50 MCG/ACT nasal spray 1 spray into the nostril(s) as directed by provider Daily As Needed for Rhinitis or Allergies. DO NOT SNIFF! 48 g 1   • lamoTRIgine ER 50 MG tablet sustained-release 24 hour Take 1 tablet by mouth Every Morning. 30 tablet 1   • lisinopril (PRINIVIL,ZESTRIL) 10 MG tablet TAKE 1 TABLET BY MOUTH DAILY 90 tablet 1   • montelukast (SINGULAIR) 10 MG tablet      • omeprazole (priLOSEC) 40 MG capsule Take 40 mg by mouth Daily.     • Spravato, 84 MG Dose, Nasal Solution ADMINISTER 84MG INTRANASALLY TWICE WEEKLY 12 each 0   • traZODone (DESYREL) 100 MG tablet Take 100 mg by mouth Daily.     • triamcinolone (KENALOG) 0.1 % ointment Apply 0.1 application topically to the appropriate area as directed 2 (Two) Times a Day.       Current Facility-Administered Medications   Medication Dose Route Frequency Provider Last Rate Last Admin   • Esketamine HCl (84 MG Dose) Nasal Solution 84 mg  84 mg Nasal Once per day on Mon Thu Jaleel Byers PA-C   84 mg at 02/11/22 1259     Psychological ROS: positive for - anxiety, concentration difficulties, depression, irritability, memory difficulties, mood swings, sleep disturbances and suicidal ideation.   negative for - disorientation, hallucinations or hostility    Physical Exam:  BP Prior to tx: 115/79  BP 40 min into tx: 117/79  BP prior to discharge: 117/81    Start time of observation: 12:59 PM  End time of observation: 3:15 PM    Mental Status Exam:   Orientation:  To person, Place, Time and Situation  Memory: Recent and remote memory Intact  Mood/Affect: Depressed, but improved.  Suicidal Ideations: SI infrequent and pt denies plans or anticipated actions.  Homicidal Ideations:  None  Hallucinations: None  Delusions:  None  Obsessions: None  Behavior and Psychomotor Activity: Appropriate  Speech:  Normal  Thought Process: Goal directed  Associations: Intact  Thought  Content: mood congruent   Language: name objects and repeat phrases  Concentration and computation: Fair  Attention Span: Fair  Fund of Knowledge: Fair  Reliability: fair  Insight into mental health: Fair  Judgement: Fair  Impulse Control:  Fair  Hygiene: Good  Cooperation:  Cooperative  Eye Contact:  Good  Physical/Medical Issues:  Yes HLD, obesity, HTN, GERD.     MSE reviewed and accepted changes from the previous visit.    PHQ-9 Depression Screening:   Little interest or pleasure in doing things? 1   Feeling down, depressed, or hopeless? 1   Trouble falling or staying asleep, or sleeping too much? 1   Feeling tired or having little energy? 1   Poor appetite or overeating? 1   Feeling bad about yourself - or that you are a failure or have let yourself or your family down? 1   Trouble concentrating on things, such as reading the newspaper or watching television? 1   Moving or speaking so slowly that other people could have noticed? Or the opposite - being so fidgety or restless that you have been moving around a lot more than usual? 1   Thoughts that you would be better off dead, or of hurting yourself in some way? 1   PHQ-9 Total Score 9   If you checked off any problems, how difficult have these problems made it for you to do your work, take care of things at home, or get along with other people? Somewhat difficult   Feeling nervous, anxious or on edge: Several days  Not being able to stop or control worrying: Several days  Worrying too much about different things: Several days  Trouble Relaxing: Several days  Being so restless that it is hard to sit still: Several days  Feeling afraid as if something awful might happen: Several days  Becoming easily annoyed or irritable: Several days  ISIDRO 7 Total Score: 7  If you checked any problems, how difficult have these problems made it for you to do your work, take care of things at home, or get along with other people: Somewhat difficult  PHQ-9: 9; mild depression in  "partial remission. Previously: 11...27...16....15...20...20s prior to Spravato.  ISIDRO-7: 7; mild anxiety. Previously 6...4...7...3....20s prior to Spravato.  MDQ: Negative; but likely positive. Negative due to \"minor problem\" and unclear if 9 manic sx occurred together. (10/18/21)    Former Smoker  I advised Hyun of the risks of tobacco use.     Assessment/Plan   Diagnoses and all orders for this visit:    1. Severe episode of recurrent major depressive disorder, without psychotic features (HCC) (Primary)    2. Generalized anxiety disorder with panic attacks    3. Other insomnia    PHQ-9: 9; mild depression in partial remission. Previously: 11...27...16....15...20...20s prior to Spravato.  ISIDRO-7: 7; mild anxiety. Previously 6...4...7...3....20s prior to Spravato.  MDQ: Negative; but likely positive. Negative due to \"minor problem\" and unclear if 9 manic sx occurred together. (10/18/21)    -Cont 84mg Spravato twice weekly due to continued benefit on depression/anxiety.    -Cont. 100mg Trazodone restarted by other provider.  -Cont. Topomax, will increase to 50mg once daily and then twice daily in the near future. Pt suppose to tell me when she is ready for that change with a new Rx.  -Cont. 300mg Wellbutin daily for depression.  -Counseling continues to be encouraged.  -F/U in 3 days for next Spravato tx. See if pt got back in touch with atheist friendly therapist, monitor improvement with Spravato and topomax.    -In the future, continue to monitor cholesterol, sodium, and wt.    Visit Diagnoses:    ICD-10-CM ICD-9-CM   1. Severe episode of recurrent major depressive disorder, without psychotic features (HCC)  F33.2 296.33   2. Generalized anxiety disorder with panic attacks  F41.1 300.02    F41.0 300.01   3. Other insomnia  G47.09 780.52      TREATMENT PLAN/GOALS: In the short-term, the patient is to continue supportive psychotherapy efforts and medications as indicated. Treatment and medication options were " discussed during today's visit. Long-term the goal will be to control symptoms so they do not negatively interfere with other aspects of the patient's life. Prognosis is optimistic with implementation of the current treatment plan. Patient ackowledged and verbally consented to the treatment plan and was educated on the importance of compliance with treatment and follow-up appointments.    MEDICATION ISSUES:  INSPECT reviewed as expected. On 84mg dose pack of Spravato.     MEDS ORDERED DURING VISIT:  No orders of the defined types were placed in this encounter.    Return in about 3 days (around 2/14/2022) for Next scheduled follow up.     I, Jaleel Byers PA-C, was present in the office suite and immediately available to furnish assistance and direction throughout the entire observation time.     Patient tolerated the procedure well without complications..     Side effects of treatment were mild and included sedation, dizziness, mild euphoria.    After the observation time, the patient was assessed by me and considered stable to leave the office with assistance. Hyun Perez  was advised not to drive or operate machinery until tomorrow following a full night's sleep.    Jaleel Byers PA-C  02/12/22   12:26 EST      This document has been electronically signed by Jaleel Byers PA-C  February 12, 2022 16:36 EST    EMR Dragon transcription disclaimer:  Part of this note may be an electronic transcription/translation of spoken language to printed text using the Dragon Dictation System.

## 2022-02-14 ENCOUNTER — OFFICE VISIT (OUTPATIENT)
Dept: PSYCHIATRY | Facility: CLINIC | Age: 42
End: 2022-02-14

## 2022-02-14 DIAGNOSIS — F41.1 GENERALIZED ANXIETY DISORDER WITH PANIC ATTACKS: Chronic | ICD-10-CM

## 2022-02-14 DIAGNOSIS — F31.81 BIPOLAR II DISORDER: Chronic | ICD-10-CM

## 2022-02-14 DIAGNOSIS — G47.09 OTHER INSOMNIA: ICD-10-CM

## 2022-02-14 DIAGNOSIS — F33.2 SEVERE EPISODE OF RECURRENT MAJOR DEPRESSIVE DISORDER, WITHOUT PSYCHOTIC FEATURES: Primary | Chronic | ICD-10-CM

## 2022-02-14 DIAGNOSIS — F41.0 GENERALIZED ANXIETY DISORDER WITH PANIC ATTACKS: Chronic | ICD-10-CM

## 2022-02-14 PROCEDURE — 99215 OFFICE O/P EST HI 40 MIN: CPT

## 2022-02-14 PROCEDURE — 99415 PROLNG CLIN STAFF SVC 1ST HR: CPT

## 2022-02-14 NOTE — PROGRESS NOTES
Spravato Monitoring Note    Start time of observation: 12:47 PM  BP check prior to administration: 120/66    Hyun Fatoualfredoleandra   presented 02/14/2022  for clinical monitoring of self administration of Spravato. The patient used a total of 3 devices, self-administered intranasally, with a 5-minute rest between each device.    Each device was checked by AFP for appropriate expiration and that 2 green indicator dots were present prior to patient administration. Afterwards, the device was checked by AFP to ensure the green indicator dots were gone and the full medication was delivered.    Patient check at 1:27 PM with BP reading of 132/81  Patient check at 2:47 PM with BP reading of 117/79    End time of observation: 3:00 PM    Patient monitored for 2 hours Patient tolerated the procedure well without complications..       NDC [id number]  LOT [id number]  EXP 08/31/2023

## 2022-02-14 NOTE — PROGRESS NOTES
Subjective   Hyun Perez is a 42 y.o. female who presents today for Spravato tx #18    Chief Complaint:  Treatment Resistant Depression    History of Present Illness:  Prior to Spravato:  -Hx of tx resistent depression failing numerous medications over many years, and insomnia.  -Significant FmHx of successful SA in mother, grandmother and extended family. Her mother passed away early 2021.   -FmHx of bipolar d/o w/o diagnosis herself. Admits to 9 possibly manic symptoms on MDQ.  -On 300mg Wellbutrin daily for depression with little to no efficacy.  -Struggles with SI daily without a plan or anticipating actions.  -Vraylar caused minimal improvements and worsened irritability, also caused oversedating in the day.  -Been reluctant to counseling due to being an atheist and fearing discrimination and judgement.  -Not been using Trazodone.    1/10-31/22: See previous notes.    2/7/22: The pt reports significant improvement in her Depression, and believes it was because she stopped Singulair, when she saw people had gotten SI and depression with the drug to the point a black box warning was added by the FDA. She never started Lamotrigine with Amitriptyline, and continued Topomax, and SI has not continued with these changes. However, occasional suicidal thoughts still occur at a rate prior to her recent episodes of severe depression with frequent SI. Denies HI or AVH, and believes Spravato has continued benefit with tolerability.    2/11/22: Pt presents stating spravato continues to be a benefit, but she has noticed no major changes since the past tx in anx/dep.  -Continues to feel stopping Singulair helped depression.  -Currently on 50mg Topamax daily with no noticed changes in dep/anx yet with the medication. Pt is to let me know when she is ready to go up to 50mg BID with a new Rx.  -Reports Spravato tx sometimes makes her tired and others seems to give her energy. She also feels like the tx lasts btw treatments  "sometimes but not others.  -Denies sugar cravings continuing since stopping lamotrigine and switching to topomax. Never started amitriptyline.  -Admits to occasional SI w/o plans or anticipated actions, but denies HI or AVH.    2/14/22: Pt reports feeling down again since her last tx, but says her anxiety is still well controlled. She requests I send 50mg Topamax twice daily for depression via mood stabilization. She is currently taking 50mg Topamax once daily. Denies any side effects of concern from Spravato, and says her SI is not worse, and she continue to deny plans or anticipated actions.     The following portions of the patient's history were reviewed and updated as appropriate: allergies, current medications, past family history, past medical history, past social history, past surgical history and problem list.    PAST PSYCHIATRIC HISTORY  Diagnosis treated:  Affective/Bipolar Disorder, Anxiety/Panic Disorder, ADHD dx in childhood.  Treatment Type:  Medication Management  Prior Psychiatric Medications:  Bupropion - takes edge off a little a bit.  prozac - inefficacy.  depakote - inefficacy.  seroquel - inefficacy.  zoloft - inefficacy.  paxil - inefficacy.  Duloxetine - inefficacy.  3mg Vraylar - oversedating and caused sugar cravings.  Trazodone - effective at 100mg nightly; makes her feel \"foggy\" and tired.  Lamictal - night sweats and sugar cravings.  Singulair - dep and SI  Spravato  *Others the patient cannot recall the names of.  Support Groups:  None  Sequelae Of Mental Disorder:  job disruption, social isolation, family disruption, financial hardships, emotional distress.    Interval History  No change since last visit, but overall improvement continues.    Side Effects  Denies from Spravato or Topamax.    Past Medical History:  Past Medical History:   Diagnosis Date   • Anxiety      Social History:  Social History     Socioeconomic History   • Marital status: Single   Tobacco Use   • Smoking status: " Former Smoker   • Smokeless tobacco: Never Used   Vaping Use   • Vaping Use: Never used   Substance and Sexual Activity   • Alcohol use: Yes     Comment: occ wine   • Drug use: Not Currently   • Sexual activity: Defer     Family History:  Family History   Problem Relation Age of Onset   • Hypertension Mother    • Coronary artery disease Mother         CABG x4 in late 30s   • Depression Mother         mother committed suicide   • Hypertension Maternal Grandmother    • Diabetes Maternal Grandmother    • Depression Maternal Grandmother         committed suicide   • Hypertension Maternal Grandfather    • Diabetes Maternal Grandfather    • Heart attack Maternal Grandfather    • Depression Maternal Cousin         committed suicide   • Depression Maternal Aunt         committed suicide     Past Surgical History:  Past Surgical History:   Procedure Laterality Date   • LEEP  1997   • TUBAL ABDOMINAL LIGATION Bilateral 2015     Problem List:  Patient Active Problem List   Diagnosis   • Seasonal allergies   • Reduced libido   • Class 2 severe obesity due to excess calories with serious comorbidity and body mass index (BMI) of 38.0 to 38.9 in adult ([facility])   • Severe episode of recurrent major depressive disorder, without psychotic features ([facility])   • Essential hypertension   • Hot flashes due to menopause   • Generalized anxiety disorder with panic attacks   • Varicose veins of lower extremity   • Mixed hyperlipidemia   • Chest pain   • Epigastric pain   • Diffuse arthralgia   • Other insomnia   • Bipolar II disorder ([facility])     Allergy:   Allergies   Allergen Reactions   • Banana Nausea Only   • Kiwi Extract Nausea Only   • Penicillin V Potassium Swelling   • Milk Thistle Other (See Comments)      Discontinued Medications:  There are no discontinued medications.   Current Medications:   Current Outpatient Medications   Medication Sig Dispense Refill   • allopurinol (Zyloprim) 100 MG tablet Take 1 tablet by mouth 2 (Two) Times a  Day. 180 tablet 1   • atorvastatin (LIPITOR) 20 MG tablet Take 1 tablet by mouth Every Night. 90 tablet 1   • buPROPion XL (Wellbutrin XL) 300 MG 24 hr tablet Take 1 tablet by mouth Daily. 90 tablet 1   • celecoxib (CeleBREX) 200 MG capsule Take 1 capsule by mouth 2 (Two) Times a Day As Needed for Moderate Pain . Take with food! 60 capsule 5   • cetirizine (zyrTEC) 10 MG tablet Take 1 tablet by mouth Every Night. 90 tablet 1   • fluticasone (FLONASE) 50 MCG/ACT nasal spray 1 spray into the nostril(s) as directed by provider Daily As Needed for Rhinitis or Allergies. DO NOT SNIFF! 48 g 1   • lamoTRIgine ER 50 MG tablet sustained-release 24 hour Take 1 tablet by mouth Every Morning. 30 tablet 1   • lisinopril (PRINIVIL,ZESTRIL) 10 MG tablet TAKE 1 TABLET BY MOUTH DAILY 90 tablet 1   • montelukast (SINGULAIR) 10 MG tablet      • omeprazole (priLOSEC) 40 MG capsule Take 40 mg by mouth Daily.     • Spravato, 84 MG Dose, Nasal Solution ADMINISTER 84MG INTRANASALLY TWICE WEEKLY 12 each 0   • topiramate (Topamax) 50 MG tablet Take 1 tablet by mouth 2 (Two) Times a Day. 60 tablet 2   • traZODone (DESYREL) 100 MG tablet Take 100 mg by mouth Daily.     • triamcinolone (KENALOG) 0.1 % ointment Apply 0.1 application topically to the appropriate area as directed 2 (Two) Times a Day.       Current Facility-Administered Medications   Medication Dose Route Frequency Provider Last Rate Last Admin   • Esketamine HCl (84 MG Dose) Nasal Solution 84 mg  84 mg Nasal Once per day on Mon Thu Jaleel Byers PA-C   84 mg at 02/14/22 1247     Psychological ROS: positive for - anxiety, concentration difficulties, depression, irritability, memory difficulties, mood swings, sleep disturbances and suicidal ideation.   negative for - disorientation, hallucinations or hostility    Physical Exam:  BP Prior to tx: 120/66  BP 40 min into tx: 132/81  BP prior to discharge: 117/79    Start time of observation: 12:47 PM  End time of observation:  3:00 PM    Mental Status Exam:   Orientation:  To person, Place, Time and Situation  Memory: Recent and remote memory Intact  Mood/Affect: Depressed, but stable.  Suicidal Ideations: SI infrequent and pt denies plans or anticipated actions.  Homicidal Ideations:  None  Hallucinations: None  Delusions:  None  Obsessions: None  Behavior and Psychomotor Activity: Appropriate  Speech:  Normal  Thought Process: Goal directed  Associations: Intact  Thought Content: mood congruent   Language: name objects and repeat phrases  Concentration and computation: Fair  Attention Span: Fair  Fund of Knowledge: Fair  Reliability: fair  Insight into mental health: Fair  Judgement: Fair  Impulse Control:  Fair  Hygiene: Good  Cooperation:  Cooperative  Eye Contact:  Good  Physical/Medical Issues:  Yes HLD, obesity, HTN, GERD.     MSE reviewed and accepted with changes from the previous visit.    PHQ-9 Depression Screening:   Little interest or pleasure in doing things? 1   Feeling down, depressed, or hopeless? 2   Trouble falling or staying asleep, or sleeping too much? 1   Feeling tired or having little energy? 1   Poor appetite or overeating? 1   Feeling bad about yourself - or that you are a failure or have let yourself or your family down? 2   Trouble concentrating on things, such as reading the newspaper or watching television? 2   Moving or speaking so slowly that other people could have noticed? Or the opposite - being so fidgety or restless that you have been moving around a lot more than usual? 1   Thoughts that you would be better off dead, or of hurting yourself in some way? 1   PHQ-9 Total Score 12   If you checked off any problems, how difficult have these problems made it for you to do your work, take care of things at home, or get along with other people? Very difficult   Feeling nervous, anxious or on edge: Several days  Not being able to stop or control worrying: Several days  Worrying too much about different things:  "Several days  Trouble Relaxing: Several days  Being so restless that it is hard to sit still: Several days  Feeling afraid as if something awful might happen: Several days  Becoming easily annoyed or irritable: Several days  ISIDRO 7 Total Score: 7  If you checked any problems, how difficult have these problems made it for you to do your work, take care of things at home, or get along with other people: Very difficult  PHQ-9: 12; mild depression in partial remission. Previously: 9...11...27...16....15...20...20s prior to Spravato.  ISIDRO-7: 7; mild anxiety. Previously 7...6...4...7...3...20s prior to Spravato.  MDQ: Negative; but likely positive. Negative due to \"minor problem\" and unclear if 9 manic sx occurred together. (10/18/21)    Former Smoker  I advised Hyun of the risks of tobacco use.     Assessment/Plan   Diagnoses and all orders for this visit:    1. Severe episode of recurrent major depressive disorder, without psychotic features (HCC) (Primary)  -     topiramate (Topamax) 50 MG tablet; Take 1 tablet by mouth 2 (Two) Times a Day.  Dispense: 60 tablet; Refill: 2    2. Generalized anxiety disorder with panic attacks  -     topiramate (Topamax) 50 MG tablet; Take 1 tablet by mouth 2 (Two) Times a Day.  Dispense: 60 tablet; Refill: 2    3. Bipolar II disorder (HCC)  -     topiramate (Topamax) 50 MG tablet; Take 1 tablet by mouth 2 (Two) Times a Day.  Dispense: 60 tablet; Refill: 2    4. Other insomnia    PHQ-9: 12; mild depression in partial remission. Previously: 9...11...27...16....15...20...20s prior to Spravato.  ISIDRO-7: 7; mild anxiety. Previously 7...6...4...7...3...20s prior to Spravato.  MDQ: Negative; but likely positive. Negative due to \"minor problem\" and unclear if 9 manic sx occurred together. (10/18/21)    -Cont 84mg Spravato twice weekly due to continued benefit on depression/anxiety.    -Cont. 100mg Trazodone restarted by other provider.  -Cont. Topomax, and Rx sent for pt to start and continue " 50mg twice daily once she has complete her current titration regimen.  -Cont. 300mg Wellbutin daily for depression.  -Counseling continues to be encouraged.  -F/U in 4d for next Spravato tx. See if pt got back in touch with atheist friendly therapist, monitor improvement with Spravato and topamax.    -In the future, continue to monitor cholesterol, sodium, and wt.    Visit Diagnoses:    ICD-10-CM ICD-9-CM   1. Severe episode of recurrent major depressive disorder, without psychotic features ([facility])  F33.2 296.33   2. Generalized anxiety disorder with panic attacks  F41.1 300.02    F41.0 300.01   3. Bipolar II disorder ([facility])  F31.81 296.89   4. Other insomnia  G47.09 780.52      TREATMENT PLAN/GOALS: In the short-term, the patient is to continue supportive psychotherapy efforts and medications as indicated. Treatment and medication options were discussed during today's visit. Long-term the goal will be to control symptoms so they do not negatively interfere with other aspects of the patient's life. Prognosis is optimistic with implementation of the current treatment plan. Patient ackowledged and verbally consented to the treatment plan and was educated on the importance of compliance with treatment and follow-up appointments.    MEDICATION ISSUES:  INSPECT reviewed as expected. On 84mg dose pack of Spravato.     MEDS ORDERED DURING VISIT:  New Medications Ordered This Visit   Medications   • topiramate (Topamax) 50 MG tablet     Sig: Take 1 tablet by mouth 2 (Two) Times a Day.     Dispense:  60 tablet     Refill:  2     Return in about 4 days (around 2/18/2022) for Next scheduled follow up.     Jaleel VACA PA-C, was present in the office suite and immediately available to furnish assistance and direction throughout the entire observation time.     Patient tolerated the procedure well without complications..     Side effects of treatment were mild and included sedation, dizziness, mild euphoria.    After  the observation time, the patient was assessed by me and considered stable to leave the office with assistance. Hyun Perez  was advised not to drive or operate machinery until tomorrow following a full night's sleep.    Jaleel Byers PA-C  02/17/22   12:26 EST      This document has been electronically signed by Jaleel Byers PA-C  February 17, 2022 22:00 EST    EMR Dragon transcription disclaimer:  Part of this note may be an electronic transcription/translation of spoken language to printed text using the Dragon Dictation System.

## 2022-02-17 RX ORDER — TOPIRAMATE 50 MG/1
50 TABLET, FILM COATED ORAL 2 TIMES DAILY
Qty: 60 TABLET | Refills: 2 | Status: SHIPPED | OUTPATIENT
Start: 2022-02-17 | End: 2022-04-11 | Stop reason: DRUGHIGH

## 2022-02-18 ENCOUNTER — OFFICE VISIT (OUTPATIENT)
Dept: PSYCHIATRY | Facility: CLINIC | Age: 42
End: 2022-02-18

## 2022-02-18 DIAGNOSIS — F41.1 GENERALIZED ANXIETY DISORDER WITH PANIC ATTACKS: Chronic | ICD-10-CM

## 2022-02-18 DIAGNOSIS — F33.2 SEVERE EPISODE OF RECURRENT MAJOR DEPRESSIVE DISORDER, WITHOUT PSYCHOTIC FEATURES: Primary | Chronic | ICD-10-CM

## 2022-02-18 DIAGNOSIS — F41.0 GENERALIZED ANXIETY DISORDER WITH PANIC ATTACKS: Chronic | ICD-10-CM

## 2022-02-18 PROCEDURE — 99215 OFFICE O/P EST HI 40 MIN: CPT

## 2022-02-18 PROCEDURE — 99415 PROLNG CLIN STAFF SVC 1ST HR: CPT

## 2022-02-18 NOTE — PROGRESS NOTES
Subjective   Hyun Perez is a 42 y.o. female who presents today for Spravato tx #19    Chief Complaint:  Treatment Resistant Depression    History of Present Illness:  Prior to Spravato:  -Hx of tx resistent depression failing numerous medications over many years, and insomnia.  -Significant FmHx of successful SA in mother, grandmother and extended family. Her mother passed away early 2021.   -FmHx of bipolar d/o w/o diagnosis herself. Admits to 9 possibly manic symptoms on MDQ.  -On 300mg Wellbutrin daily for depression with little to no efficacy.  -Struggles with SI daily without a plan or anticipating actions.  -Vraylar caused minimal improvements and worsened irritability, also caused oversedating in the day.  -Been reluctant to counseling due to being an atheist and fearing discrimination and judgement.  -Not been using Trazodone.    1/10/22-2/14/22: See previous notes.    2/18/22: Pt says she has been feeling a little more anxious and her depression seems a little better, but she had a bad day one day. She feels this bad day was associated with her leaving a singulair in her medication box she believes she must have too that day on accident. She is continuing to take 50mg daily of Topamax. She denies touching base with there therapist she planned on getting set up with, and says she will try and remember to call. SI stable w/o worsening, plans, or anticipated actions.    The following portions of the patient's history were reviewed and updated as appropriate: allergies, current medications, past family history, past medical history, past social history, past surgical history and problem list.    PAST PSYCHIATRIC HISTORY  Diagnosis treated:  Affective/Bipolar Disorder, Anxiety/Panic Disorder, ADHD dx in childhood.  Treatment Type:  Medication Management  Prior Psychiatric Medications:  Bupropion - takes edge off a little a bit.  prozac - inefficacy.  depakote - inefficacy.  seroquel - inefficacy.  zoloft -  "inefficacy.  paxil - inefficacy.  Duloxetine - inefficacy.  3mg Vraylar - oversedating and caused sugar cravings.  Trazodone - effective at 100mg nightly; makes her feel \"foggy\" and tired.  Lamictal - night sweats and sugar cravings.  Singulair - dep and SI  Spravato  *Others the patient cannot recall the names of.  Support Groups:  None  Sequelae Of Mental Disorder:  job disruption, social isolation, family disruption, financial hardships, emotional distress.    Interval History  Possible slight improvement in depression.    Side Effects  Denies from Spravato or Topamax.    Past Medical History:  Past Medical History:   Diagnosis Date   • Anxiety      Social History:  Social History     Socioeconomic History   • Marital status: Single   Tobacco Use   • Smoking status: Former Smoker   • Smokeless tobacco: Never Used   Vaping Use   • Vaping Use: Never used   Substance and Sexual Activity   • Alcohol use: Yes     Comment: occ wine   • Drug use: Not Currently   • Sexual activity: Defer     Family History:  Family History   Problem Relation Age of Onset   • Hypertension Mother    • Coronary artery disease Mother         CABG x4 in late 30s   • Depression Mother         mother committed suicide   • Hypertension Maternal Grandmother    • Diabetes Maternal Grandmother    • Depression Maternal Grandmother         committed suicide   • Hypertension Maternal Grandfather    • Diabetes Maternal Grandfather    • Heart attack Maternal Grandfather    • Depression Maternal Cousin         committed suicide   • Depression Maternal Aunt         committed suicide     Past Surgical History:  Past Surgical History:   Procedure Laterality Date   • LEEP  1997   • TUBAL ABDOMINAL LIGATION Bilateral 2015     Problem List:  Patient Active Problem List   Diagnosis   • Seasonal allergies   • Reduced libido   • Class 2 severe obesity due to excess calories with serious comorbidity and body mass index (BMI) of 38.0 to 38.9 in adult (HCC)   • " Severe episode of recurrent major depressive disorder, without psychotic features ([facility])   • Essential hypertension   • Hot flashes due to menopause   • Generalized anxiety disorder with panic attacks   • Varicose veins of lower extremity   • Mixed hyperlipidemia   • Chest pain   • Epigastric pain   • Diffuse arthralgia   • Other insomnia   • Bipolar II disorder ([facility])     Allergy:   Allergies   Allergen Reactions   • Banana Nausea Only   • Kiwi Extract Nausea Only   • Penicillin V Potassium Swelling   • Milk Thistle Other (See Comments)      Discontinued Medications:  Medications Discontinued During This Encounter   Medication Reason   • lamoTRIgine ER 50 MG tablet sustained-release 24 hour Side effects      Current Medications:   Current Outpatient Medications   Medication Sig Dispense Refill   • allopurinol (Zyloprim) 100 MG tablet Take 1 tablet by mouth 2 (Two) Times a Day. 180 tablet 1   • atorvastatin (LIPITOR) 20 MG tablet Take 1 tablet by mouth Every Night. 90 tablet 1   • buPROPion XL (Wellbutrin XL) 300 MG 24 hr tablet Take 1 tablet by mouth Daily. 90 tablet 1   • celecoxib (CeleBREX) 200 MG capsule Take 1 capsule by mouth 2 (Two) Times a Day As Needed for Moderate Pain . Take with food! 60 capsule 5   • cetirizine (zyrTEC) 10 MG tablet Take 1 tablet by mouth Every Night. 90 tablet 1   • fluticasone (FLONASE) 50 MCG/ACT nasal spray 1 spray into the nostril(s) as directed by provider Daily As Needed for Rhinitis or Allergies. DO NOT SNIFF! 48 g 1   • lisinopril (PRINIVIL,ZESTRIL) 10 MG tablet TAKE 1 TABLET BY MOUTH DAILY 90 tablet 1   • montelukast (SINGULAIR) 10 MG tablet      • omeprazole (priLOSEC) 40 MG capsule Take 40 mg by mouth Daily.     • Spravato, 84 MG Dose, Nasal Solution ADMINISTER 84MG INTRANASALLY TWICE WEEKLY 12 each 0   • topiramate (Topamax) 50 MG tablet Take 1 tablet by mouth 2 (Two) Times a Day. 60 tablet 2   • traZODone (DESYREL) 100 MG tablet Take 100 mg by mouth Daily.     •  triamcinolone (KENALOG) 0.1 % ointment Apply 0.1 application topically to the appropriate area as directed 2 (Two) Times a Day.       Current Facility-Administered Medications   Medication Dose Route Frequency Provider Last Rate Last Admin   • Esketamine HCl (84 MG Dose) Nasal Solution 84 mg  84 mg Nasal Once per day on Mon Thu Jaleel Byers PA-C   84 mg at 02/21/22 1308     Psychological ROS: positive for - anxiety, concentration difficulties, depression, irritability, memory difficulties, mood swings, sleep disturbances and suicidal ideation.   negative for - disorientation, hallucinations or hostility    Physical Exam:  BP Prior to tx: 129/74  BP 40 min into tx: 127/80  BP prior to discharge: 124/78    Start time of observation: 1:10 PM  End time of observation: 3:20 PM    Mental Status Exam:   Orientation:  To person, Place, Time and Situation  Memory: Recent and remote memory Intact  Mood/Affect: Depressed, but stable.  Suicidal Ideations: SI infrequent and pt denies plans or anticipated actions.  Homicidal Ideations:  None  Hallucinations: None  Delusions:  None  Obsessions: None  Behavior and Psychomotor Activity: Appropriate  Speech:  Normal  Thought Process: Goal directed  Associations: Intact  Thought Content: mood congruent   Language: name objects and repeat phrases  Concentration and computation: Fair  Attention Span: Fair  Fund of Knowledge: Fair  Reliability: fair  Insight into mental health: Fair  Judgement: Fair  Impulse Control:  Fair  Hygiene: Good  Cooperation:  Cooperative  Eye Contact:  Good  Physical/Medical Issues:  Yes HLD, obesity, HTN, GERD.     MSE reviewed and accepted with no needed changes from the previous visit.    PHQ-9 Depression Screening:   Little interest or pleasure in doing things? 1   Feeling down, depressed, or hopeless? 1   Trouble falling or staying asleep, or sleeping too much? 1   Feeling tired or having little energy? 2   Poor appetite or overeating? 2  "  Feeling bad about yourself - or that you are a failure or have let yourself or your family down? 2   Trouble concentrating on things, such as reading the newspaper or watching television? 2   Moving or speaking so slowly that other people could have noticed? Or the opposite - being so fidgety or restless that you have been moving around a lot more than usual? 1   Thoughts that you would be better off dead, or of hurting yourself in some way? 1   PHQ-9 Total Score 13   If you checked off any problems, how difficult have these problems made it for you to do your work, take care of things at home, or get along with other people? Very difficult   Feeling nervous, anxious or on edge: More than half the days  Not being able to stop or control worrying: More than half the days  Worrying too much about different things: More than half the days  Trouble Relaxing: More than half the days  Being so restless that it is hard to sit still: More than half the days  Feeling afraid as if something awful might happen: More than half the days  Becoming easily annoyed or irritable: More than half the days  ISIDRO 7 Total Score: 14  If you checked any problems, how difficult have these problems made it for you to do your work, take care of things at home, or get along with other people: Somewhat difficult  PHQ-9: 13; mild depression in partial remission. Previously: 12...9...11...27...16....15...20...20s prior to Spravato.  ISIDRO-7: 14; mild anxiety. Previously 7...7...6...4...7...3...20s prior to Spravato.  MDQ: Negative; but likely positive. Negative due to \"minor problem\" and unclear if 9 manic sx occurred together. (10/18/21)    Former Smoker  I advised Hyun of the risks of tobacco use.     Assessment/Plan   Diagnoses and all orders for this visit:    1. Severe episode of recurrent major depressive disorder, without psychotic features (HCC) (Primary)    2. Generalized anxiety disorder with panic attacks    PHQ-9: 13; mild depression " "in partial remission. Previously: 12...9...11...27...16....15...20...20s prior to Spravato.  ISIDRO-7: 14; mild anxiety. Previously 7...7...6...4...7...3...20s prior to Spravato.  MDQ: Negative; but likely positive. Negative due to \"minor problem\" and unclear if 9 manic sx occurred together. (10/18/21)    -Cont 84mg Spravato twice weekly due to continued benefit on depression/anxiety.    -Cont. 100mg Trazodone restarted by other provider.  -Cont. Topomax, and Rx sent for pt to start and continue 50mg twice daily once she has complete her current titration regimen.  -Cont. 300mg Wellbutin daily for depression.  -Counseling continues to be encouraged.  -F/U in 3d for next Spravato tx. See if pt got back in touch with atheist friendly therapist, monitor improvement with Spravato and topamax.    -In the future, continue to monitor cholesterol, sodium, and wt.    Visit Diagnoses:    ICD-10-CM ICD-9-CM   1. Severe episode of recurrent major depressive disorder, without psychotic features (HCC)  F33.2 296.33   2. Generalized anxiety disorder with panic attacks  F41.1 300.02    F41.0 300.01      TREATMENT PLAN/GOALS: In the short-term, the patient is to continue supportive psychotherapy efforts and medications as indicated. Treatment and medication options were discussed during today's visit. Long-term the goal will be to control symptoms so they do not negatively interfere with other aspects of the patient's life. Prognosis is optimistic with implementation of the current treatment plan. Patient ackowledged and verbally consented to the treatment plan and was educated on the importance of compliance with treatment and follow-up appointments.    MEDICATION ISSUES:  INSPECT reviewed as expected. On 84mg dose pack of Spravato.     MEDS ORDERED DURING VISIT:  No orders of the defined types were placed in this encounter.    Return in about 3 days (around 2/21/2022) for Next scheduled follow up.     IJaleel PA-C, " was present in the office suite and immediately available to furnish assistance and direction throughout the entire observation time.     Patient tolerated the procedure well without complications..     Side effects of treatment were mild and included sedation, dizziness, mild euphoria.    After the observation time, the patient was assessed by me and considered stable to leave the office with assistance. Hyun Perez  was advised not to drive or operate machinery until tomorrow following a full night's sleep.    Jaleel Byers PA-C  02/24/22   12:26 EST      This document has been electronically signed by Jaleel Byers PA-C  February 24, 2022 21:57 EST    EMR Dragon transcription disclaimer:  Part of this note may be an electronic transcription/translation of spoken language to printed text using the Dragon Dictation System.

## 2022-02-18 NOTE — PROGRESS NOTES
Spravato Monitoring Note    Start time of observation: 1:10 PM  BP check prior to administration: 129/74    Hyun Perez   presented 02/18/2022  for clinical monitoring of self administration of Spravato. The patient used a total of 3 devices, self-administered intranasally, with a 5-minute rest between each device.    Each device was checked by AFP for appropriate expiration and that 2 green indicator dots were present prior to patient administration. Afterwards, the device was checked by AFP to ensure the green indicator dots were gone and the full medication was delivered.    Patient check at 1:50 pm with BP reading of 127/80  Patient check at 3:10 pm with BP reading of 124/78    End time of observation: 3:20 pm    Patient monitored for 2 hours Patient tolerated the procedure well without complications..       NDC [id number]  LOT [id number]  EXP 08/31/2023

## 2022-02-19 DIAGNOSIS — F41.1 GENERALIZED ANXIETY DISORDER WITH PANIC ATTACKS: Chronic | ICD-10-CM

## 2022-02-19 DIAGNOSIS — F33.2 SEVERE EPISODE OF RECURRENT MAJOR DEPRESSIVE DISORDER, WITHOUT PSYCHOTIC FEATURES: Chronic | ICD-10-CM

## 2022-02-19 DIAGNOSIS — F41.0 GENERALIZED ANXIETY DISORDER WITH PANIC ATTACKS: Chronic | ICD-10-CM

## 2022-02-19 DIAGNOSIS — E66.01 CLASS 2 SEVERE OBESITY DUE TO EXCESS CALORIES WITH SERIOUS COMORBIDITY AND BODY MASS INDEX (BMI) OF 38.0 TO 38.9 IN ADULT: ICD-10-CM

## 2022-02-21 ENCOUNTER — OFFICE VISIT (OUTPATIENT)
Dept: PSYCHIATRY | Facility: CLINIC | Age: 42
End: 2022-02-21

## 2022-02-21 DIAGNOSIS — G47.09 OTHER INSOMNIA: ICD-10-CM

## 2022-02-21 DIAGNOSIS — F41.1 GENERALIZED ANXIETY DISORDER WITH PANIC ATTACKS: Chronic | ICD-10-CM

## 2022-02-21 DIAGNOSIS — F41.0 GENERALIZED ANXIETY DISORDER WITH PANIC ATTACKS: Chronic | ICD-10-CM

## 2022-02-21 DIAGNOSIS — F33.2 SEVERE EPISODE OF RECURRENT MAJOR DEPRESSIVE DISORDER, WITHOUT PSYCHOTIC FEATURES: Primary | Chronic | ICD-10-CM

## 2022-02-21 PROCEDURE — 99415 PROLNG CLIN STAFF SVC 1ST HR: CPT

## 2022-02-21 PROCEDURE — 99215 OFFICE O/P EST HI 40 MIN: CPT

## 2022-02-21 NOTE — PROGRESS NOTES
Spravato Monitoring Note    Start time of observation: 1:08 pm  BP check prior to administration: 122/79    Hyun Perez   presented 02/21/2022  for clinical monitoring of self administration of Spravato. The patient used a total of 3 devices, self-administered intranasally, with a 5-minute rest between each device.    Each device was checked by afp for appropriate expiration and that 2 green indicator dots were present prior to patient administration. Afterwards, the device was checked by afp to ensure the green indicator dots were gone and the full medication was delivered.    Patient check at 1:48 pm  with BP reading of 122/76  Patient check at 3:08 pm with BP reading of 124/88    End time of observation: 3:10 pm    Patient monitored for 2 hours Patient tolerated the procedure well without complications..       NDC [id number]  LOT [id number]  EXP 10/31/2023

## 2022-02-22 DIAGNOSIS — F33.2 SEVERE EPISODE OF RECURRENT MAJOR DEPRESSIVE DISORDER, WITHOUT PSYCHOTIC FEATURES: Chronic | ICD-10-CM

## 2022-02-22 RX ORDER — TOPIRAMATE 25 MG/1
TABLET ORAL
Qty: 49 TABLET | Refills: 0 | OUTPATIENT
Start: 2022-02-22

## 2022-02-22 RX ORDER — ESKETAMINE HYDROCHLORIDE 28 MG/.2ML
SOLUTION NASAL
Qty: 12 EACH | Refills: 0 | Status: SHIPPED | OUTPATIENT
Start: 2022-02-22 | End: 2022-03-10 | Stop reason: SDUPTHER

## 2022-02-25 DIAGNOSIS — I10 ESSENTIAL HYPERTENSION: ICD-10-CM

## 2022-02-25 RX ORDER — LISINOPRIL 10 MG/1
10 TABLET ORAL DAILY
Qty: 90 TABLET | Refills: 1 | Status: SHIPPED | OUTPATIENT
Start: 2022-02-25 | End: 2022-09-01

## 2022-02-25 NOTE — PROGRESS NOTES
Subjective   Hyun Perez is a 42 y.o. female who presents today for Spravato tx #20    Chief Complaint:  Treatment Resistant Depression    History of Present Illness:  Prior to Spravato:  -Hx of tx resistent depression failing numerous medications over many years, and insomnia:.  -Significant FmHx of successful SA in mother, grandmother and extended family. Her mother passed away early 2021.   -FmHx of bipolar d/o w/o diagnosis herself. Admits to 9 possibly manic symptoms on MDQ.  -On 300mg Wellbutrin daily for depression with little to no efficacy.  -Struggles with SI daily without a plan or anticipating actions.  -Vraylar caused minimal improvements and worsened irritability, also caused oversedating in the day.  -Been reluctant to counseling due to being an atheist and fearing discrimination and judgement.  -Not been using Trazodone.    1/10/22-2/14/22: See previous notes.    2/18/22: Pt says she has been feeling a little more anxious and her depression seems a little better, but she had a bad day one day. She feels this bad day was associated with her leaving a singulair in her medication box she believes she must have too that day on accident. She is continuing to take 50mg daily of Topamax. She denies touching base with there therapist she planned on getting set up with, and says she will try and remember to call. SI stable w/o worsening, plans, or anticipated actions.    2/21/22: Pt reports her anxiety and depression are unchanged, but she has been sleeping well. She has now started Topamax at 50mg BID and is tolerating it well. SI stable w/o plans or anticipated actions, and she denies HI or AVH.    The following portions of the patient's history were reviewed and updated as appropriate: allergies, current medications, past family history, past medical history, past social history, past surgical history and problem list.    PAST PSYCHIATRIC HISTORY  Diagnosis treated:  Affective/Bipolar Disorder,  "Anxiety/Panic Disorder, ADHD dx in childhood.  Treatment Type:  Medication Management  Prior Psychiatric Medications:  Bupropion - takes edge off a little a bit.  prozac - inefficacy.  depakote - inefficacy.  seroquel - inefficacy.  zoloft - inefficacy.  paxil - inefficacy.  Duloxetine - inefficacy.  3mg Vraylar - oversedating and caused sugar cravings.  Trazodone - effective at 100mg nightly; makes her feel \"foggy\" and tired.  Lamictal - night sweats and sugar cravings.  Singulair - dep and SI  Spravato  *Others the patient cannot recall the names of.  Support Groups:  None  Sequelae Of Mental Disorder:  job disruption, social isolation, family disruption, financial hardships, emotional distress.    Interval History  No change    Side Effects  Denies from Spravato or Topamax.    Past Medical History:  Past Medical History:   Diagnosis Date   • Anxiety      Social History:  Social History     Socioeconomic History   • Marital status: Single   Tobacco Use   • Smoking status: Former Smoker   • Smokeless tobacco: Never Used   Vaping Use   • Vaping Use: Never used   Substance and Sexual Activity   • Alcohol use: Yes     Comment: occ wine   • Drug use: Not Currently   • Sexual activity: Defer     Family History:  Family History   Problem Relation Age of Onset   • Hypertension Mother    • Coronary artery disease Mother         CABG x4 in late 30s   • Depression Mother         mother committed suicide   • Hypertension Maternal Grandmother    • Diabetes Maternal Grandmother    • Depression Maternal Grandmother         committed suicide   • Hypertension Maternal Grandfather    • Diabetes Maternal Grandfather    • Heart attack Maternal Grandfather    • Depression Maternal Cousin         committed suicide   • Depression Maternal Aunt         committed suicide     Past Surgical History:  Past Surgical History:   Procedure Laterality Date   • LEEP  1997   • TUBAL ABDOMINAL LIGATION Bilateral 2015     Problem List:  Patient " Active Problem List   Diagnosis   • Seasonal allergies   • Reduced libido   • Class 2 severe obesity due to excess calories with serious comorbidity and body mass index (BMI) of 38.0 to 38.9 in adult ([facility])   • Severe episode of recurrent major depressive disorder, without psychotic features ([facility])   • Essential hypertension   • Hot flashes due to menopause   • Generalized anxiety disorder with panic attacks   • Varicose veins of lower extremity   • Mixed hyperlipidemia   • Chest pain   • Epigastric pain   • Diffuse arthralgia   • Other insomnia   • Bipolar II disorder ([facility])     Allergy:   Allergies   Allergen Reactions   • Banana Nausea Only   • Kiwi Extract Nausea Only   • Penicillin V Potassium Swelling   • Milk Thistle Other (See Comments)      Discontinued Medications:  There are no discontinued medications.   Current Medications:   Current Outpatient Medications   Medication Sig Dispense Refill   • allopurinol (Zyloprim) 100 MG tablet Take 1 tablet by mouth 2 (Two) Times a Day. 180 tablet 1   • atorvastatin (LIPITOR) 20 MG tablet Take 1 tablet by mouth Every Night. 90 tablet 1   • buPROPion XL (Wellbutrin XL) 300 MG 24 hr tablet Take 1 tablet by mouth Daily. 90 tablet 1   • celecoxib (CeleBREX) 200 MG capsule Take 1 capsule by mouth 2 (Two) Times a Day As Needed for Moderate Pain . Take with food! 60 capsule 5   • cetirizine (zyrTEC) 10 MG tablet Take 1 tablet by mouth Every Night. 90 tablet 1   • fluticasone (FLONASE) 50 MCG/ACT nasal spray 1 spray into the nostril(s) as directed by provider Daily As Needed for Rhinitis or Allergies. DO NOT SNIFF! 48 g 1   • lisinopril (PRINIVIL,ZESTRIL) 10 MG tablet TAKE 1 TABLET BY MOUTH DAILY 90 tablet 1   • montelukast (SINGULAIR) 10 MG tablet      • omeprazole (priLOSEC) 40 MG capsule Take 40 mg by mouth Daily.     • Spravato, 84 MG Dose, Nasal Solution ADMINISTER 84MG INTRANASALLY TWICE WEEKLY 12 each 0   • topiramate (Topamax) 50 MG tablet Take 1 tablet by mouth 2 (Two)  Times a Day. 60 tablet 2   • traZODone (DESYREL) 100 MG tablet Take 100 mg by mouth Daily.     • triamcinolone (KENALOG) 0.1 % ointment Apply 0.1 application topically to the appropriate area as directed 2 (Two) Times a Day.       Current Facility-Administered Medications   Medication Dose Route Frequency Provider Last Rate Last Admin   • Esketamine HCl (84 MG Dose) Nasal Solution 84 mg  84 mg Nasal Once per day on Mon Thu Jaleel Byers PA-C   84 mg at 02/21/22 1308     Psychological ROS: positive for - anxiety, concentration difficulties, depression, irritability, memory difficulties, mood swings, sleep disturbances and suicidal ideation.   negative for - disorientation, hallucinations or hostility    Physical Exam:  BP Prior to tx: 122/79  BP 40 min into tx: 122/76  BP prior to discharge: 124/88    Start time of observation: 1:08 PM  End time of observation: 3:10 PM    Mental Status Exam:   Orientation:  To person, Place, Time and Situation  Memory: Recent and remote memory Intact  Mood/Affect: Depressed, but stable.  Suicidal Ideations: SI infrequent and pt denies plans or anticipated actions.  Homicidal Ideations:  None  Hallucinations: None  Delusions:  None  Obsessions: None  Behavior and Psychomotor Activity: Appropriate  Speech:  Normal  Thought Process: Goal directed  Associations: Intact  Thought Content: mood congruent   Language: name objects and repeat phrases  Concentration and computation: Fair  Attention Span: Fair  Fund of Knowledge: Fair  Reliability: fair  Insight into mental health: Fair  Judgement: Fair  Impulse Control:  Fair  Hygiene: Good  Cooperation:  Cooperative  Eye Contact:  Good  Physical/Medical Issues:  Yes HLD, obesity, HTN, GERD.     MSE reviewed and accepted with no needed changes from the previous visit.    PHQ-9 Depression Screening:   Little interest or pleasure in doing things? 1   Feeling down, depressed, or hopeless? 1   Trouble falling or staying asleep, or  "sleeping too much? 1   Feeling tired or having little energy? 1   Poor appetite or overeating? 1   Feeling bad about yourself - or that you are a failure or have let yourself or your family down? 1   Trouble concentrating on things, such as reading the newspaper or watching television? 1   Moving or speaking so slowly that other people could have noticed? Or the opposite - being so fidgety or restless that you have been moving around a lot more than usual? 1   Thoughts that you would be better off dead, or of hurting yourself in some way? 1   PHQ-9 Total Score 9   If you checked off any problems, how difficult have these problems made it for you to do your work, take care of things at home, or get along with other people? Somewhat difficult   Feeling nervous, anxious or on edge: Several days  Not being able to stop or control worrying: Several days  Worrying too much about different things: Several days  Trouble Relaxing: Several days  Being so restless that it is hard to sit still: Several days  Feeling afraid as if something awful might happen: Several days  Becoming easily annoyed or irritable: Several days  ISIDRO 7 Total Score: 7  If you checked any problems, how difficult have these problems made it for you to do your work, take care of things at home, or get along with other people: Somewhat difficult  PHQ-9: 9; mild depression in partial remission. Previously: 13...12...9...11...27...16....15...20...20s prior to Spravato.  ISIDRO-7: 7; mild anxiety. Previously: 14...7...7...6...4...7...3...20s prior to Spravato.  MDQ: Negative; but likely positive. Negative due to \"minor problem\" and unclear if 9 manic sx occurred together. (10/18/21)    Former Smoker  I advised Hyun of the risks of tobacco use.     Assessment/Plan   Diagnoses and all orders for this visit:    1. Severe episode of recurrent major depressive disorder, without psychotic features (HCC) (Primary)    2. Generalized anxiety disorder with panic " "attacks    3. Other insomnia    PHQ-9: 9; mild depression in partial remission. Previously: 13...12...9...11...27...16....15...20...20s prior to Spravato.  ISIDRO-7: 7; mild anxiety. Previously: 14...7...7...6...4...7...3...20s prior to Spravato.  MDQ: Negative; but likely positive. Negative due to \"minor problem\" and unclear if 9 manic sx occurred together. (10/18/21)    -Cont 84mg Spravato twice weekly due to continued benefit on depression/anxiety.    -Cont. 100mg Trazodone restarted by other provider.  -Cont. 50mg Topamax BID for mood stabilization.   -Cont. 300mg Wellbutin daily for depression.  -Counseling continues to be encouraged.  -F/U in 4d for next Spravato tx. See if pt got back in touch with atheist friendly therapist, monitor improvement with Spravato and topamax.    -In the future, continue to monitor cholesterol, sodium, and wt.    Visit Diagnoses:    ICD-10-CM ICD-9-CM   1. Severe episode of recurrent major depressive disorder, without psychotic features (HCC)  F33.2 296.33   2. Generalized anxiety disorder with panic attacks  F41.1 300.02    F41.0 300.01   3. Other insomnia  G47.09 780.52      TREATMENT PLAN/GOALS: In the short-term, the patient is to continue supportive psychotherapy efforts and medications as indicated. Treatment and medication options were discussed during today's visit. Long-term the goal will be to control symptoms so they do not negatively interfere with other aspects of the patient's life. Prognosis is optimistic with implementation of the current treatment plan. Patient ackowledged and verbally consented to the treatment plan and was educated on the importance of compliance with treatment and follow-up appointments.    MEDICATION ISSUES:  INSPECT reviewed as expected. On 84mg dose pack of Spravato.     MEDS ORDERED DURING VISIT:  No orders of the defined types were placed in this encounter.    Return in about 4 days (around 2/25/2022) for Next scheduled follow up.     I, " Jaleel Byers PA-C, was present in the office suite and immediately available to furnish assistance and direction throughout the entire observation time.     Patient tolerated the procedure well without complications..     Side effects of treatment were mild and included sedation, dizziness, mild euphoria.    After the observation time, the patient was assessed by me and considered stable to leave the office with assistance. Hyun Perez  was advised not to drive or operate machinery until tomorrow following a full night's sleep.    Jaleel Byers PA-C  02/24/22   12:26 EST      This document has been electronically signed by Jaleel Byers PA-C  February 24, 2022 22:18 EST    EMR Dragon transcription disclaimer:  Part of this note may be an electronic transcription/translation of spoken language to printed text using the Dragon Dictation System.

## 2022-02-28 ENCOUNTER — OFFICE VISIT (OUTPATIENT)
Dept: PSYCHIATRY | Facility: CLINIC | Age: 42
End: 2022-02-28

## 2022-02-28 DIAGNOSIS — F41.0 GENERALIZED ANXIETY DISORDER WITH PANIC ATTACKS: Chronic | ICD-10-CM

## 2022-02-28 DIAGNOSIS — F41.1 GENERALIZED ANXIETY DISORDER WITH PANIC ATTACKS: Chronic | ICD-10-CM

## 2022-02-28 DIAGNOSIS — F33.2 SEVERE EPISODE OF RECURRENT MAJOR DEPRESSIVE DISORDER, WITHOUT PSYCHOTIC FEATURES: Primary | Chronic | ICD-10-CM

## 2022-02-28 PROCEDURE — 99415 PROLNG CLIN STAFF SVC 1ST HR: CPT

## 2022-02-28 PROCEDURE — 99416 PROLNG CLIN STAFF SVC EA ADD: CPT

## 2022-02-28 PROCEDURE — 99215 OFFICE O/P EST HI 40 MIN: CPT

## 2022-03-01 RX ORDER — AMITRIPTYLINE HYDROCHLORIDE 25 MG/1
TABLET, FILM COATED ORAL
Qty: 30 TABLET | OUTPATIENT
Start: 2022-03-01

## 2022-03-04 ENCOUNTER — LAB (OUTPATIENT)
Dept: FAMILY MEDICINE CLINIC | Facility: CLINIC | Age: 42
End: 2022-03-04

## 2022-03-04 ENCOUNTER — OFFICE VISIT (OUTPATIENT)
Dept: FAMILY MEDICINE CLINIC | Facility: CLINIC | Age: 42
End: 2022-03-04

## 2022-03-04 VITALS
DIASTOLIC BLOOD PRESSURE: 81 MMHG | HEART RATE: 85 BPM | HEIGHT: 67 IN | WEIGHT: 243 LBS | OXYGEN SATURATION: 99 % | TEMPERATURE: 97.7 F | SYSTOLIC BLOOD PRESSURE: 115 MMHG | BODY MASS INDEX: 38.14 KG/M2

## 2022-03-04 DIAGNOSIS — J30.2 SEASONAL ALLERGIES: ICD-10-CM

## 2022-03-04 DIAGNOSIS — Z12.31 ENCOUNTER FOR SCREENING MAMMOGRAM FOR MALIGNANT NEOPLASM OF BREAST: ICD-10-CM

## 2022-03-04 DIAGNOSIS — E78.2 MIXED HYPERLIPIDEMIA: ICD-10-CM

## 2022-03-04 DIAGNOSIS — I10 ESSENTIAL HYPERTENSION: Primary | ICD-10-CM

## 2022-03-04 LAB
CHOLEST SERPL-MCNC: 299 MG/DL (ref 0–200)
HDLC SERPL-MCNC: 39 MG/DL (ref 40–60)
LDLC SERPL CALC-MCNC: 225 MG/DL (ref 0–100)
LDLC/HDLC SERPL: 5.74 {RATIO}
TRIGL SERPL-MCNC: 180 MG/DL (ref 0–150)
VLDLC SERPL-MCNC: 35 MG/DL (ref 5–40)

## 2022-03-04 PROCEDURE — 36415 COLL VENOUS BLD VENIPUNCTURE: CPT | Performed by: FAMILY MEDICINE

## 2022-03-04 PROCEDURE — 80061 LIPID PANEL: CPT | Performed by: FAMILY MEDICINE

## 2022-03-04 PROCEDURE — 99213 OFFICE O/P EST LOW 20 MIN: CPT | Performed by: FAMILY MEDICINE

## 2022-03-04 RX ORDER — LEVOCETIRIZINE DIHYDROCHLORIDE 5 MG/1
5 TABLET, FILM COATED ORAL EVERY EVENING
Qty: 90 TABLET | Refills: 1 | Status: SHIPPED | OUTPATIENT
Start: 2022-03-04 | End: 2022-09-26

## 2022-03-04 NOTE — PROGRESS NOTES
Assessment and Plan     Problem List Items Addressed This Visit        Allergies and Adverse Reactions    Seasonal allergies    Overview     Reports worsening depression with Flonase. Would like to avoid other intranasal sprays.  Switching Zyrtec to Xyzal.  Do not restart Singular due to exacerbated of mood disorder.   Advised to try local honey.  RTO if symptoms worsen.           Relevant Medications    levocetirizine (Xyzal) 5 MG tablet       Cardiac and Vasculature    Essential hypertension - Primary    Overview     BP at goal, <140/90.  Encouraged low-Na+ diet & 150 min exercise/week.   Continue lisinopril 10 mg daily.  Monitoring renal function.           Mixed hyperlipidemia    Overview     Reviewed lipid panel & LDL goal.  Encouraged a diet rich in vegetables & 150 minutes of exercise weekly.  Did not start stain.  Would like to have LDL rechecked.           Relevant Orders    Lipid Panel (Completed)      Other Visit Diagnoses     Encounter for screening mammogram for malignant neoplasm of breast        Relevant Orders    Mammo Screening Digital Tomosynthesis Bilateral With CAD        Return in about 6 months (around 9/4/2022) for Annual Physical Exam.        Patient was given instructions and counseling regarding her condition or for health maintenance advice. Please see specific information pulled into the AVS if appropriate.        Hyun is a 42 y.o. being seen today for  Hypertension (follow up bp and chol ), Hyperlipidemia, and Allergies (current allergy meds not working )   Subjective   History of the Present Illness     Obese  female with a concurrent medical history of hypertension, hyperlipidemia, and MDD presents for follow-up.      Normotensive in office with lisinopril 10 mg daily.  Patient never started statin therapy.  Reports that she has eliminated meat from her diet for the last 8 months, since September 2021.  Additionally she is walking twice a week.  Would like to have levels  "rechecked    Patient endorses worsening mood with Singulair use.  Has since discontinued it.  Complaining of sinus congestion.  Unwilling to use intranasal steroids sprays due to concerns of worsening mood.    Social History  She  reports that she has quit smoking. She has never used smokeless tobacco. She reports current alcohol use. She reports previous drug use.  Objective   Vital Signs          Vitals:    03/04/22 1259   BP: 115/81   BP Location: Left arm   Patient Position: Sitting   Cuff Size: Large Adult   Pulse: 85   Temp: 97.7 °F (36.5 °C)   TempSrc: Infrared   SpO2: 99%   Weight: 110 kg (243 lb)   Height: 170.2 cm (67\")     BP Readings from Last 1 Encounters:   03/04/22 115/81     Wt Readings from Last 3 Encounters:   03/04/22 110 kg (243 lb)   11/08/21 113 kg (250 lb)   11/03/21 112 kg (248 lb)   Body mass index is 38.06 kg/m².     Physical Exam  Vitals reviewed.   Constitutional:       General: She is not in acute distress.     Appearance: Normal appearance. She is obese.   HENT:      Head: Normocephalic and atraumatic.   Cardiovascular:      Rate and Rhythm: Normal rate.      Heart sounds: Normal heart sounds.   Pulmonary:      Effort: Pulmonary effort is normal.      Breath sounds: Normal breath sounds.   Neurological:      Mental Status: She is alert and oriented to person, place, and time.   Psychiatric:         Mood and Affect: Mood normal.         Behavior: Behavior normal.               Lipid Panel (11/03/2021 15:52)  Comprehensive Metabolic Panel (09/22/2021 15:28)  CBC Auto Differential (09/22/2021 15:28)    The 10-year ASCVD risk score (Juan Josedelmi REDDY Jr., et al., 2013) is: 1.4%    Values used to calculate the score:      Age: 42 years      Sex: Female      Is Non- : No      Diabetic: No      Tobacco smoker: No      Systolic Blood Pressure: 115 mmHg      Is BP treated: Yes      HDL Cholesterol: 45 mg/dL      Total Cholesterol: 239 mg/dL    "

## 2022-03-07 ENCOUNTER — OFFICE VISIT (OUTPATIENT)
Dept: PSYCHIATRY | Facility: CLINIC | Age: 42
End: 2022-03-07

## 2022-03-07 DIAGNOSIS — F41.1 GENERALIZED ANXIETY DISORDER WITH PANIC ATTACKS: Chronic | ICD-10-CM

## 2022-03-07 DIAGNOSIS — F41.0 GENERALIZED ANXIETY DISORDER WITH PANIC ATTACKS: Chronic | ICD-10-CM

## 2022-03-07 DIAGNOSIS — F33.2 SEVERE EPISODE OF RECURRENT MAJOR DEPRESSIVE DISORDER, WITHOUT PSYCHOTIC FEATURES: Primary | Chronic | ICD-10-CM

## 2022-03-07 PROCEDURE — 99215 OFFICE O/P EST HI 40 MIN: CPT

## 2022-03-07 PROCEDURE — 99416 PROLNG CLIN STAFF SVC EA ADD: CPT

## 2022-03-07 PROCEDURE — 99415 PROLNG CLIN STAFF SVC 1ST HR: CPT

## 2022-03-07 NOTE — PROGRESS NOTES
"Subjective   Hyun Perez is a 42 y.o. female who presents today for Spravato tx #22    Chief Complaint:  Treatment Resistant Depression    History of Present Illness:  Prior to Spravato:  -Hx of tx resistent depression failing numerous medications over many years, and insomnia:.  -Significant FmHx of successful SA in mother, grandmother and extended family. Her mother passed away early 2021.   -FmHx of bipolar d/o w/o diagnosis herself. Admits to 9 possibly manic symptoms on MDQ.  -On 300mg Wellbutrin daily for depression with little to no efficacy.  -Struggles with SI daily without a plan or anticipating actions.  -Vraylar caused minimal improvements and worsened irritability, also caused oversedating in the day.  -Been reluctant to counseling due to being an atheist and fearing discrimination and judgement.  -Not been using Trazodone.    1/10/22-2/21/22: See previous notes.    2/28/22: Pt would like to move her treatment to once weekly. Had her last treatment one week ago and says she hasn't had any \"dips\" in her mood. Says she has \"felt fine\" and even been productive. States her mood is level and denies any continued SI. Believes Singulair was a big part of her previous depression problem. Denies AVH or SI/HI.    3/7/22: Pt feels her depression or anxiety didn't worsen between txs despite waiting a week, and continues to be fine with continuing at once weekly intervals. Continues to feel \"fine,\" and hasn't ever started with a therapist. Believes Spravato has helped her and continues to do so, and denies any concerns ADRs during or between treatments. Continues to use Topomax 50mg BID w/o concern for ADRs. Admits to rare SI w/o plans or anticipated acts, but denies AVH or HI.    The following portions of the patient's history were reviewed and updated as appropriate: allergies, current medications, past family history, past medical history, past social history, past surgical history and problem list.    PAST " "PSYCHIATRIC HISTORY  Diagnosis treated:  Affective/Bipolar Disorder, Anxiety/Panic Disorder, ADHD dx in childhood.  Treatment Type:  Medication Management  Prior Psychiatric Medications:  Bupropion - takes edge off a little a bit.  prozac - inefficacy.  depakote - inefficacy.  seroquel - inefficacy.  zoloft - inefficacy.  paxil - inefficacy.  Duloxetine - inefficacy.  3mg Vraylar - oversedating and caused sugar cravings.  Trazodone - effective at 100mg nightly; makes her feel \"foggy\" and tired.  Lamictal - night sweats and sugar cravings.  Singulair - dep and SI  Spravato  *Others the patient cannot recall the names of.  Support Groups:  None  Sequelae Of Mental Disorder:  job disruption, social isolation, family disruption, financial hardships, emotional distress.    Interval History  Continued improvement from baseline but no worsening since last tx.    Side Effects  Denies from Spravato or Topamax.    Past Medical History:  Past Medical History:   Diagnosis Date   • Anxiety      Social History:  Social History     Socioeconomic History   • Marital status: Single   Tobacco Use   • Smoking status: Former Smoker   • Smokeless tobacco: Never Used   Vaping Use   • Vaping Use: Never used   Substance and Sexual Activity   • Alcohol use: Yes     Comment: occ wine   • Drug use: Not Currently   • Sexual activity: Defer     Family History:  Family History   Problem Relation Age of Onset   • Hypertension Mother    • Coronary artery disease Mother         CABG x4 in late 30s   • Depression Mother         mother committed suicide   • Hypertension Maternal Grandmother    • Diabetes Maternal Grandmother    • Depression Maternal Grandmother         committed suicide   • Hypertension Maternal Grandfather    • Diabetes Maternal Grandfather    • Heart attack Maternal Grandfather    • Depression Maternal Cousin         committed suicide   • Depression Maternal Aunt         committed suicide     Past Surgical History:  Past Surgical " History:   Procedure Laterality Date   • LEEP  1997   • TUBAL ABDOMINAL LIGATION Bilateral 2015     Problem List:  Patient Active Problem List   Diagnosis   • Seasonal allergies   • Reduced libido   • Class 2 severe obesity due to excess calories with serious comorbidity and body mass index (BMI) of 38.0 to 38.9 in adult ([facility])   • Severe episode of recurrent major depressive disorder, without psychotic features ([facility])   • Essential hypertension   • Hot flashes due to menopause   • Generalized anxiety disorder with panic attacks   • Varicose veins of lower extremity   • Mixed hyperlipidemia   • Chest pain   • Epigastric pain   • Diffuse arthralgia   • Other insomnia   • Bipolar II disorder ([facility])     Allergy:   Allergies   Allergen Reactions   • Banana Nausea Only   • Kiwi Extract Nausea Only   • Penicillin V Potassium Swelling   • Milk Thistle Other (See Comments)      Discontinued Medications:  There are no discontinued medications.   Current Medications:   Current Outpatient Medications   Medication Sig Dispense Refill   • allopurinol (Zyloprim) 100 MG tablet Take 1 tablet by mouth 2 (Two) Times a Day. 180 tablet 1   • atorvastatin (LIPITOR) 20 MG tablet Take 1 tablet by mouth Every Night. 90 tablet 1   • buPROPion XL (Wellbutrin XL) 300 MG 24 hr tablet Take 1 tablet by mouth Daily. 90 tablet 1   • celecoxib (CeleBREX) 200 MG capsule Take 1 capsule by mouth 2 (Two) Times a Day As Needed for Moderate Pain . Take with food! 60 capsule 5   • Esketamine HCl, 84 MG Dose, (Spravato, 84 MG Dose,) Nasal Solution 6 sprays into the nostril(s) as directed by provider 1 (One) Time Per Week. 4 each 2   • fluticasone (FLONASE) 50 MCG/ACT nasal spray 1 spray into the nostril(s) as directed by provider Daily As Needed for Rhinitis or Allergies. DO NOT SNIFF! 48 g 1   • levocetirizine (Xyzal) 5 MG tablet Take 1 tablet by mouth Every Evening. 90 tablet 1   • lisinopril (PRINIVIL,ZESTRIL) 10 MG tablet TAKE 1 TABLET BY MOUTH DAILY 90  tablet 1   • omeprazole (priLOSEC) 40 MG capsule Take 40 mg by mouth Daily.     • topiramate (Topamax) 50 MG tablet Take 1 tablet by mouth 2 (Two) Times a Day. 60 tablet 2   • traZODone (DESYREL) 100 MG tablet Take 100 mg by mouth Daily.     • triamcinolone (KENALOG) 0.1 % ointment Apply 0.1 application topically to the appropriate area as directed 2 (Two) Times a Day.       Current Facility-Administered Medications   Medication Dose Route Frequency Provider Last Rate Last Admin   • Esketamine HCl (84 MG Dose) Nasal Solution 84 mg  84 mg Nasal Once per day on Mon Thu Jaleel Byers PA-C   84 mg at 03/07/22 1300     Psychological ROS: positive for - anx/dep (improved), concentration difficulties, memory difficulties, rare SI w/o plans or anticipated acts.  negative for - disorientation, hallucinations or hostility, mood swings, sleep disturbances, irritability    Physical Exam:  BP Prior to tx: 130/77  BP 40 min into tx: 144/87  BP prior to discharge: 113/71    Start time of observation: 1:00 PM  End time of observation: 3:02 PM    Mental Status Exam:   Orientation:  To person, Place, Time and Situation  Memory: Recent and remote memory Intact  Mood/Affect: Depressed, but stable.  Suicidal Ideations: Denies  Homicidal Ideations:  None  Hallucinations: None  Delusions:  None  Obsessions: None  Behavior and Psychomotor Activity: Appropriate  Speech:  Normal  Thought Process: Goal directed  Associations: Intact  Thought Content: mood congruent   Language: name objects and repeat phrases  Concentration and computation: Fair  Attention Span: Fair  Fund of Knowledge: Fair  Reliability: fair  Insight into mental health: Fair  Judgement: Fair  Impulse Control:  Fair  Hygiene: Good  Cooperation:  Cooperative  Eye Contact: Fair  Physical/Medical Issues:  Yes HLD, obesity, HTN, GERD.     MSE reviewed and accepted with no needed changes from the previous visit.    PHQ-9 Depression Screening: Table won't  "populate.  Little interest or pleasure in doing things?     Feeling down, depressed, or hopeless?     Trouble falling or staying asleep, or sleeping too much?     Feeling tired or having little energy?     Poor appetite or overeating?     Feeling bad about yourself - or that you are a failure or have let yourself or your family down?     Trouble concentrating on things, such as reading the newspaper or watching television?     Moving or speaking so slowly that other people could have noticed? Or the opposite - being so fidgety or restless that you have been moving around a lot more than usual?     Thoughts that you would be better off dead, or of hurting yourself in some way?     PHQ-9 Total Score  9   If you checked off any problems, how difficult have these problems made it for you to do your work, take care of things at home, or get along with other people?     Feeling nervous, anxious or on edge: Several days  Not being able to stop or control worrying: Several days  Worrying too much about different things: Several days  Trouble Relaxing: Several days  Being so restless that it is hard to sit still: Several days  Feeling afraid as if something awful might happen: Several days  Becoming easily annoyed or irritable: Several days  ISIDRO 7 Total Score: 7  If you checked any problems, how difficult have these problems made it for you to do your work, take care of things at home, or get along with other people: Somewhat difficult  PHQ-9: 9; mild depression in partial remission. Previously: 9, 9, 13, 12, 9, 11, 27, 16, 15, 20, and 20s prior to Spravato.  ISIDRO-7: 7; mild anxiety in partial remission. Previously: 7, 9, 14, 7, 7, 6, 4, 7, 3, 20s prior to Spravato.  MDQ: Negative; likely positive. \"minor problem\" but 9 sx that co-occurred on MDQ. (10/18/21)    Former Smoker  I advised Hyun of the risks of tobacco use.     Assessment/Plan   Diagnoses and all orders for this visit:    1. Severe episode of recurrent major " "depressive disorder, without psychotic features (HCC) (Primary)    2. Generalized anxiety disorder with panic attacks    PHQ-9: 9; mild depression in partial remission. Previously: 9, 9, 13, 12, 9, 11, 27, 16, 15, 20, and 20s prior to Spravato.  ISIDRO-7: 7; mild anxiety in partial remission. Previously: 7, 9, 14, 7, 7, 6, 4, 7, 3, 20s prior to Spravato.  MDQ: Negative; likely positive. \"minor problem\" but 9 sx that co-occurred on MDQ. (10/18/21)    -Cont 84mg Spravato at once weekly rate to continued benefit on depression/anxiety, but start tapering process to establish remission.    -Cont. 100mg Trazodone restarted by other provider, but pt said not been needed lately.  -Cont. 50mg Topamax BID for mood stabilization.   -Cont. 300mg Wellbutin daily for depression.  -Counseling continues to be encouraged.  -F/U in 1w for next Spravato tx. See if pt got back in touch with atheist friendly therapist, monitor improvement with Spravato and topamax.    -In the future, continue to monitor sodium and wt.    Visit Diagnoses:    ICD-10-CM ICD-9-CM   1. Severe episode of recurrent major depressive disorder, without psychotic features (HCC)  F33.2 296.33   2. Generalized anxiety disorder with panic attacks  F41.1 300.02    F41.0 300.01      TREATMENT PLAN/GOALS: In the short-term, the patient is to continue supportive psychotherapy efforts and medications as indicated. Treatment and medication options were discussed during today's visit. Long-term the goal will be to control symptoms so they do not negatively interfere with other aspects of the patient's life. Prognosis is optimistic with implementation of the current treatment plan. Patient ackowledged and verbally consented to the treatment plan and was educated on the importance of compliance with treatment and follow-up appointments.    MEDICATION ISSUES:  INSPECT reviewed as expected. On 84mg dose pack of Spravato.     MEDS ORDERED DURING VISIT:  No orders of the defined " types were placed in this encounter.    Return in about 1 week (around 3/14/2022) for Next scheduled follow up.     I, Jaleel Byers PA-C, was present in the office suite and immediately available to furnish assistance and direction throughout the entire observation time.     Patient tolerated the procedure well without complications..     Side effects of treatment were mild and included sedation, dizziness, mild euphoria, and mild BP elevation.    After the observation time, the patient was assessed by me and considered stable to leave the office with assistance. Hyun Perez  was advised not to drive or operate machinery until tomorrow following a full night's sleep.    Jaleel Byers PA-C  03/14/22   12:26 EST      This document has been electronically signed by Jaleel Byers PA-C  March 14, 2022 00:14 EDT    EMR Dragon transcription disclaimer:  Part of this note may be an electronic transcription/translation of spoken language to printed text using the Dragon Dictation System.

## 2022-03-07 NOTE — PROGRESS NOTES
Spravato Monitoring Note    Start time of observation: 1:00pm  BP check prior to administration: 130/77    Hyun Perez   presented 03/07/2022  for clinical monitoring of self administration of Spravato. The patient used a total of 3 devices, self-administered intranasally, with a 5-minute rest between each device.    Each device was checked by klb for appropriate expiration and that 2 green indicator dots were present prior to patient administration. Afterwards, the device was checked by klb to ensure the green indicator dots were gone and the full medication was delivered.    Patient check at 1:42pm with BP reading of 144/87  Patient check at 3:02pm with BP reading of 113/71    End time of observation: 3:02pm    Patient monitored for 2 hours Patient tolerated the procedure well without complications..       NDC [id number]  LOT [id number]    EXP 2023 Oct

## 2022-03-08 ENCOUNTER — TELEPHONE (OUTPATIENT)
Dept: FAMILY MEDICINE CLINIC | Facility: CLINIC | Age: 42
End: 2022-03-08

## 2022-03-08 NOTE — TELEPHONE ENCOUNTER
Caller: Hyun Perez    Relationship: Self    Best call back number: [phone]    Who are you requesting to speak with (clinical staff, provider,  specific staff member): BHARGAVI    What was the call regarding: PATIENT WAS RETURNING PHONE CALL REGARDING TEST RESULTS.    Do you require a callback: YES, ATTEMPTED WARM TRANSFER NO ANSWER.

## 2022-03-09 ENCOUNTER — TELEPHONE (OUTPATIENT)
Dept: PSYCHIATRY | Facility: CLINIC | Age: 42
End: 2022-03-09

## 2022-03-09 DIAGNOSIS — F33.2 SEVERE EPISODE OF RECURRENT MAJOR DEPRESSIVE DISORDER, WITHOUT PSYCHOTIC FEATURES: Primary | Chronic | ICD-10-CM

## 2022-03-09 NOTE — TELEPHONE ENCOUNTER
Can you send a new Spravato Rx to Hyun's pharmacy since she is only coming once a week on Mondays now?

## 2022-03-10 RX ORDER — ESKETAMINE HYDROCHLORIDE 28 MG/.2ML
84 SOLUTION NASAL WEEKLY
Qty: 4 EACH | Refills: 2 | Status: SHIPPED | OUTPATIENT
Start: 2022-03-10 | End: 2022-03-13 | Stop reason: SDUPTHER

## 2022-03-11 NOTE — TELEPHONE ENCOUNTER
Can you re-send this to [facility]; all of the Spravato pts use specialty pharmacies.  Hyun's pharmacy is [facility].  Thanks.

## 2022-03-13 RX ORDER — ESKETAMINE HYDROCHLORIDE 28 MG/.2ML
84 SOLUTION NASAL WEEKLY
Qty: 4 EACH | Refills: 2 | Status: SHIPPED | OUTPATIENT
Start: 2022-03-13

## 2022-03-21 ENCOUNTER — OFFICE VISIT (OUTPATIENT)
Dept: PSYCHIATRY | Facility: CLINIC | Age: 42
End: 2022-03-21

## 2022-03-21 DIAGNOSIS — F41.1 GENERALIZED ANXIETY DISORDER WITH PANIC ATTACKS: Chronic | ICD-10-CM

## 2022-03-21 DIAGNOSIS — F33.41 RECURRENT MAJOR DEPRESSIVE DISORDER, IN PARTIAL REMISSION: Primary | Chronic | ICD-10-CM

## 2022-03-21 DIAGNOSIS — F41.0 GENERALIZED ANXIETY DISORDER WITH PANIC ATTACKS: Chronic | ICD-10-CM

## 2022-03-21 DIAGNOSIS — G47.09 OTHER INSOMNIA: ICD-10-CM

## 2022-03-21 PROCEDURE — 99416 PROLNG CLIN STAFF SVC EA ADD: CPT

## 2022-03-21 PROCEDURE — 99214 OFFICE O/P EST MOD 30 MIN: CPT

## 2022-03-21 PROCEDURE — 99415 PROLNG CLIN STAFF SVC 1ST HR: CPT

## 2022-03-21 RX ORDER — TRETINOIN 0.5 MG/G
CREAM TOPICAL
COMMUNITY
Start: 2022-03-15

## 2022-03-21 RX ORDER — CLINDAMYCIN PHOSPHATE 10 MG/G
GEL TOPICAL
COMMUNITY
Start: 2022-03-15

## 2022-03-21 NOTE — PROGRESS NOTES
"Subjective   Hyun Perez is a 42 y.o. female who presents today for Spravato tx #23    Chief Complaint:  Treatment Resistant Depression    History of Present Illness:  Prior to Spravato:  -Hx of tx resistent depression failing numerous medications over many years, and insomnia:.  -Significant FmHx of successful SA in mother, grandmother and extended family. Her mother passed away early 2021.   -FmHx of bipolar d/o w/o diagnosis herself. Admits to 9 possibly manic symptoms on MDQ.  -On 300mg Wellbutrin daily for depression with little to no efficacy.  -Struggles with SI daily without a plan or anticipating actions.  -Vraylar caused minimal improvements and worsened irritability, also caused oversedating in the day.  -Been reluctant to counseling due to being an atheist and fearing discrimination and judgement.  -Not been using Trazodone.    1/10/22-3/7/22: See previous notes.    3/21/22: Pt hasn't had a tx for 2 weeks due to a dermatology appointment, and says she may have experienced some depression recurrence and feeling down this past weekend. Denies symptoms from tx today, and believes Spravato continues to be effective and beneficial.   -Says her sleep is \"alright,\" but has not been taking trazodone due to it not working well. She doesn't want to try another medication at this time or go up in the dose.  -Tolerates Topamax and believes she obtains benefit from the medication.  -Anx/Dep remains controlled on Spravato, and pt is willing to continue once weekly treatments, with consideration of moving to every other week in the near future.  -Denies AVH, SI/HI.    The following portions of the patient's history were reviewed and updated as appropriate: allergies, current medications, past family history, past medical history, past social history, past surgical history and problem list.    PAST PSYCHIATRIC HISTORY  Diagnosis treated:  Affective/Bipolar Disorder, Anxiety/Panic Disorder, ADHD dx in " "childhood.  Treatment Type:  Medication Management  Prior Psychiatric Medications:  Bupropion - takes edge off a little a bit.  prozac - inefficacy.  depakote - inefficacy.  seroquel - inefficacy.  zoloft - inefficacy.  paxil - inefficacy.  Duloxetine - inefficacy.  3mg Vraylar - oversedating and caused sugar cravings.  Trazodone - effective at 100mg nightly; makes her feel \"foggy\" and tired.  Lamictal - night sweats and sugar cravings.  Singulair - dep and SI  Spravato  *Others the patient cannot recall the names of.  Support Groups:  None  Sequelae Of Mental Disorder:  job disruption, social isolation, family disruption, financial hardships, emotional distress.    Interval History  Continued improvement from baseline but no worsening since last tx.    Side Effects  Denies from Spravato or Topamax.    Past Medical History:  Past Medical History:   Diagnosis Date   • Anxiety      Social History:  Social History     Socioeconomic History   • Marital status: Single   Tobacco Use   • Smoking status: Former Smoker   • Smokeless tobacco: Never Used   Vaping Use   • Vaping Use: Never used   Substance and Sexual Activity   • Alcohol use: Yes     Comment: occ wine   • Drug use: Not Currently   • Sexual activity: Defer     Family History:  Family History   Problem Relation Age of Onset   • Hypertension Mother    • Coronary artery disease Mother         CABG x4 in late 30s   • Depression Mother         mother committed suicide   • Hypertension Maternal Grandmother    • Diabetes Maternal Grandmother    • Depression Maternal Grandmother         committed suicide   • Hypertension Maternal Grandfather    • Diabetes Maternal Grandfather    • Heart attack Maternal Grandfather    • Depression Maternal Cousin         committed suicide   • Depression Maternal Aunt         committed suicide     Past Surgical History:  Past Surgical History:   Procedure Laterality Date   • GILMER  1997   • TUBAL ABDOMINAL LIGATION Bilateral 2015 "     Problem List:  Patient Active Problem List   Diagnosis   • Seasonal allergies   • Reduced libido   • Class 2 severe obesity due to excess calories with serious comorbidity and body mass index (BMI) of 38.0 to 38.9 in adult ([facility])   • Recurrent major depressive disorder, in partial remission ([facility])   • Essential hypertension   • Hot flashes due to menopause   • Generalized anxiety disorder with panic attacks   • Varicose veins of lower extremity   • Mixed hyperlipidemia   • Chest pain   • Epigastric pain   • Diffuse arthralgia   • Other insomnia   • Bipolar II disorder ([facility])     Allergy:   Allergies   Allergen Reactions   • Banana Nausea Only   • Kiwi Extract Nausea Only   • Penicillin V Potassium Swelling   • Milk Thistle Other (See Comments)      Discontinued Medications:  There are no discontinued medications.   Current Medications:   Current Outpatient Medications   Medication Sig Dispense Refill   • allopurinol (Zyloprim) 100 MG tablet Take 1 tablet by mouth 2 (Two) Times a Day. 180 tablet 1   • atorvastatin (LIPITOR) 20 MG tablet Take 1 tablet by mouth Every Night. 90 tablet 1   • buPROPion XL (Wellbutrin XL) 300 MG 24 hr tablet Take 1 tablet by mouth Daily. 90 tablet 1   • celecoxib (CeleBREX) 200 MG capsule Take 1 capsule by mouth 2 (Two) Times a Day As Needed for Moderate Pain . Take with food! 60 capsule 5   • clindamycin (CLINDAGEL) 1 % gel APPLY PEA SIZED AMOUNT TOPICALLY TO THE AFFECTED AREA EVERY MORNING     • Esketamine HCl, 84 MG Dose, (Spravato, 84 MG Dose,) Nasal Solution 6 sprays into the nostril(s) as directed by provider 1 (One) Time Per Week. 4 each 2   • fluticasone (FLONASE) 50 MCG/ACT nasal spray 1 spray into the nostril(s) as directed by provider Daily As Needed for Rhinitis or Allergies. DO NOT SNIFF! 48 g 1   • levocetirizine (Xyzal) 5 MG tablet Take 1 tablet by mouth Every Evening. 90 tablet 1   • lisinopril (PRINIVIL,ZESTRIL) 10 MG tablet TAKE 1 TABLET BY MOUTH DAILY 90 tablet 1   •  omeprazole (priLOSEC) 40 MG capsule Take 40 mg by mouth Daily.     • topiramate (Topamax) 50 MG tablet Take 1 tablet by mouth 2 (Two) Times a Day. 60 tablet 2   • traZODone (DESYREL) 100 MG tablet Take 100 mg by mouth Daily.     • tretinoin (RETIN-A) 0.05 % cream APPLY PEA SIZED AMOUNT TOPICALLY TO THE AFFECTED AREA AT BEDTIME. START 2 NIGHTS EVERY WEEK. INCREASE AS TOLERATED     • triamcinolone (KENALOG) 0.1 % ointment Apply 0.1 application topically to the appropriate area as directed 2 (Two) Times a Day.       Current Facility-Administered Medications   Medication Dose Route Frequency Provider Last Rate Last Admin   • Esketamine HCl (84 MG Dose) Nasal Solution 84 mg  84 mg Nasal Once per day on Mon Thu Jaleel Byers PA-C   84 mg at 03/21/22 1307     Psychological ROS: positive for - some anx/dep w/ anhedonia continue, concentration difficulties, memory difficulties, fatigue, sleep difficulty, appetite change, some psychomotor depression.  negative for - disorientation, hallucinations or hostility, mood swings, sleep disturbances, irritability, SI.    Physical Exam:  BP Prior to tx: 114/66  BP 40 min into tx: 138/87  BP prior to discharge: 121/76    Start time of observation: 1:07 PM  End time of observation: 3:10 PM    Mental Status Exam:   Orientation:  To person, Place, Time and Situation  Memory: Recent and remote memory Intact  Mood/Affect: Slightly depressed but stable and improved.  Suicidal Ideations: Denies  Homicidal Ideations:  None  Hallucinations: None  Delusions:  None  Obsessions: None  Behavior and Psychomotor Activity: Appropriate  Speech: Minimal  Thought Process: Goal directed  Associations: Intact  Thought Content: mood congruent   Language: name objects and repeat phrases  Concentration and computation: Fair  Attention Span: Fair  Fund of Knowledge: Fair  Reliability: fair  Insight into mental health: Fair  Judgement: Fair  Impulse Control:  Fair  Hygiene: Good  Cooperation:   "Cooperative  Eye Contact: Fair  Physical/Medical Issues:  Yes HLD, obesity, HTN, GERD.     MSE reviewed and accepted with no needed changes from the previous visit.    PHQ-9 Depression Screening: Table won't populate.  Little interest or pleasure in doing things?     Feeling down, depressed, or hopeless?     Trouble falling or staying asleep, or sleeping too much?     Feeling tired or having little energy?     Poor appetite or overeating?     Feeling bad about yourself - or that you are a failure or have let yourself or your family down?     Trouble concentrating on things, such as reading the newspaper or watching television?     Moving or speaking so slowly that other people could have noticed? Or the opposite - being so fidgety or restless that you have been moving around a lot more than usual?     Thoughts that you would be better off dead, or of hurting yourself in some way?     PHQ-9 Total Score  11   If you checked off any problems, how difficult have these problems made it for you to do your work, take care of things at home, or get along with other people?     Feeling nervous, anxious or on edge: Several days  Not being able to stop or control worrying: Several days  Worrying too much about different things: Several days  Trouble Relaxing: Several days  Being so restless that it is hard to sit still: Several days  Feeling afraid as if something awful might happen: Several days  Becoming easily annoyed or irritable: Several days  ISIDRO 7 Total Score: 7  If you checked any problems, how difficult have these problems made it for you to do your work, take care of things at home, or get along with other people: Somewhat difficult  PHQ-9: 11; moderate depression. Previously: 9, 9, 9, 13, 12, 9, 11, 27, 16, 15, 20, and 20s prior to Spravato.  ISIDRO-7: 7; mild anxiety. Previously: 7, 7, 9, 14, 7, 7, 6, 4, 7, 3, 20s prior to Spravato.  MDQ: Negative; likely positive. \"minor problem\" but 9 sx that co-occurred on MDQ. " "(10/18/21)    Former Smoker  I advised Hyun of the risks of tobacco use.     Assessment/Plan   Diagnoses and all orders for this visit:    1. Recurrent major depressive disorder, in partial remission (HCC) (Primary)    2. Generalized anxiety disorder with panic attacks    3. Other insomnia    PHQ-9: 11; moderate depression. Previously: 9, 9, 9, 13, 12, 9, 11, 27, 16, 15, 20, and 20s prior to Spravato.  ISIDRO-7: 7; mild anxiety. Previously: 7, 7, 9, 14, 7, 7, 6, 4, 7, 3, 20s prior to Spravato.  MDQ: Negative; likely positive. \"minor problem\" but 9 sx that co-occurred on MDQ. (10/18/21)    -Cont 84mg Spravato at once weekly rate to continue benefit on depression/anxiety, will soon consider tapering to every other week.    -Cont. 100mg Trazodone restarted by other provider, but pt said not been needed lately.  -Cont. 50mg Topamax BID for mood stabilization.   -Cont. 300mg Wellbutin daily for depression.  -Counseling continues to be encouraged.  -F/U in 1w for next Spravato tx. See if pt got back in touch with atheist friendly therapist, monitor improvement with Spravato and topamax.    -In the future, continue to monitor sodium and wt.    Visit Diagnoses:    ICD-10-CM ICD-9-CM   1. Recurrent major depressive disorder, in partial remission (HCC)  F33.41 296.35   2. Generalized anxiety disorder with panic attacks  F41.1 300.02    F41.0 300.01   3. Other insomnia  G47.09 780.52      TREATMENT PLAN/GOALS: In the short-term, the patient is to continue supportive psychotherapy efforts and medications as indicated. Treatment and medication options were discussed during today's visit. Long-term the goal will be to control symptoms so they do not negatively interfere with other aspects of the patient's life. Prognosis is optimistic with implementation of the current treatment plan. Patient ackowledged and verbally consented to the treatment plan and was educated on the importance of compliance with treatment and follow-up " appointments.    MEDICATION ISSUES:  INSPECT reviewed as expected. On 84mg dose pack of Spravato.     MEDS ORDERED DURING VISIT:  No orders of the defined types were placed in this encounter.    Return in about 1 week (around 3/28/2022) for Recheck.     I, Jaleel Byers PA-C, was present in the office suite and immediately available to furnish assistance and direction throughout the entire observation time.     Patient tolerated the procedure well without complications..     Side effects of treatment were mild and included sedation, dizziness, mild euphoria, and mild BP elevation.    After the observation time, the patient was assessed by me and considered stable to leave the office with assistance. Hyun Perez  was advised not to drive or operate machinery until tomorrow following a full night's sleep.    Jaleel Byers PA-C  03/27/22   12:26 EST      This document has been electronically signed by Jaleel Byers PA-C  March 27, 2022 15:48 EDT    EMR Dragon transcription disclaimer:  Part of this note may be an electronic transcription/translation of spoken language to printed text using the Dragon Dictation System.

## 2022-03-21 NOTE — PROGRESS NOTES
Spravato Monitoring Note    Start time of observation: 1:07pm  BP check prior to administration: 114/66    Hyun Fatoualfredoleandra   presented 03/21/2022  for clinical monitoring of self administration of Spravato. The patient used a total of 3 devices, self-administered intranasally, with a 5-minute rest between each device.    Each device was checked by klb for appropriate expiration and that 2 green indicator dots were present prior to patient administration. Afterwards, the device was checked by klb to ensure the green indicator dots were gone and the full medication was delivered.    Patient check at 1:50pm with BP reading of 138/87  Patient check at 3:10pm with BP reading of 121/76    End time of observation: 3:10pm    Patient monitored for 2 hours Patient tolerated the procedure well without complications..       NDC [id number]  LOT [id number]  EXP 2023 OCT

## 2022-03-28 ENCOUNTER — OFFICE VISIT (OUTPATIENT)
Dept: PSYCHIATRY | Facility: CLINIC | Age: 42
End: 2022-03-28

## 2022-03-28 DIAGNOSIS — G47.09 OTHER INSOMNIA: Chronic | ICD-10-CM

## 2022-03-28 DIAGNOSIS — F33.41 RECURRENT MAJOR DEPRESSIVE DISORDER, IN PARTIAL REMISSION: Primary | Chronic | ICD-10-CM

## 2022-03-28 DIAGNOSIS — F41.0 GENERALIZED ANXIETY DISORDER WITH PANIC ATTACKS: Chronic | ICD-10-CM

## 2022-03-28 DIAGNOSIS — F41.1 GENERALIZED ANXIETY DISORDER WITH PANIC ATTACKS: Chronic | ICD-10-CM

## 2022-03-28 PROCEDURE — 99214 OFFICE O/P EST MOD 30 MIN: CPT

## 2022-03-28 PROCEDURE — 99415 PROLNG CLIN STAFF SVC 1ST HR: CPT

## 2022-03-28 PROCEDURE — 99416 PROLNG CLIN STAFF SVC EA ADD: CPT

## 2022-03-28 RX ORDER — TEMAZEPAM 7.5 MG/1
7.5 CAPSULE ORAL NIGHTLY PRN
Qty: 30 CAPSULE | Refills: 0 | Status: SHIPPED | OUTPATIENT
Start: 2022-03-28 | End: 2022-04-11 | Stop reason: ALTCHOICE

## 2022-03-28 NOTE — PROGRESS NOTES
Spravato Monitoring Note    Start time of observation: 1:03pm  BP check prior to administration: 118/72    Hyun Fatoualfredoleandra   presented 03/28/2022  for clinical monitoring of self administration of Spravato. The patient used a total of 3 devices, self-administered intranasally, with a 5-minute rest between each device.    Each device was checked by klb for appropriate expiration and that 2 green indicator dots were present prior to patient administration. Afterwards, the device was checked by klb to ensure the green indicator dots were gone and the full medication was delivered.    Patient check at 1:46pm with BP reading of 130/79  Patient check at 3:06pm with BP reading of 117/74    End time of observation: 3:06pm    Patient monitored for 2 hours Patient tolerated the procedure well without complications..       NDC [id number]  LOT [id number]  EXP 2023 Sep

## 2022-03-28 NOTE — PROGRESS NOTES
"Subjective   Hyun Perez is a 42 y.o. female who presents today for Spravato tx #24    Chief Complaint:  Treatment Resistant Depression    History of Present Illness:  Prior to Spravato:  -Hx of tx resistent depression failing numerous medications over many years, and insomnia:.  -Significant FmHx of successful SA in mother, grandmother and extended family. Her mother passed away early 2021.   -FmHx of bipolar d/o w/o diagnosis herself. Admits to 9 possibly manic symptoms on MDQ.  -On 300mg Wellbutrin daily for depression with little to no efficacy.  -Struggles with SI daily without a plan or anticipating actions.  -Vraylar caused minimal improvements and worsened irritability, also caused oversedating in the day.  -Been reluctant to counseling due to being an atheist and fearing discrimination and judgement.  -Not been using Trazodone.    1/10/22-3/7/22: See previous notes.    3/21/22: Pt hasn't had a tx for 2 weeks due to a dermatology appointment, and says she may have experienced some depression recurrence and feeling down this past weekend. Denies symptoms from tx today, and believes Spravato continues to be effective and beneficial.   -Says her sleep is \"alright,\" but has not been taking trazodone due to it not working well. She doesn't want to try another medication at this time or go up in the dose.  -Tolerates Topamax and believes she obtains benefit from the medication.  -Anx/Dep remains controlled on Spravato, and pt is willing to continue once weekly treatments, with consideration of moving to every other week in the near future.  -Denies AVH, SI/HI.    3/28/22: C/o insomnia due to tinnitis going on for 2 years. Feels she needs a \"real\" sleeping medication because trazodone was not effective for her and she has extensive difficulty tolerating antidepressants. Says the tinnitis is causing her to not get sleep, and is willing to try restoril. Denies worsening of depression or anxiety symptoms, and feels " "stable. Continues to feel Spravato is helpful, and denies concerns for side effects during or between treatments. Denies AVH or SI/HI.    The following portions of the patient's history were reviewed and updated as appropriate: allergies, current medications, past family history, past medical history, past social history, past surgical history and problem list.    PAST PSYCHIATRIC HISTORY  Diagnosis treated:  Affective/Bipolar Disorder, Anxiety/Panic Disorder, ADHD dx in childhood.  Treatment Type:  Medication Management  Prior Psychiatric Medications:  Bupropion - takes edge off a little a bit.  prozac - inefficacy.  depakote - inefficacy.  seroquel - inefficacy.  zoloft - inefficacy.  paxil - inefficacy.  Duloxetine - inefficacy.  3mg Vraylar - oversedating and caused sugar cravings.  Trazodone - effective at 100mg nightly; makes her feel \"foggy\" and tired.  Lamictal - night sweats and sugar cravings.  Singulair - dep and SI  Spravato - effective at improving and maintaining depression remission.  Trazodone - ineffective for sleep.  *Others the patient cannot recall the names of.  Support Groups:  None  Sequelae Of Mental Disorder:  job disruption, social isolation, family disruption, financial hardships, emotional distress.    Interval History  Continued improvement from baseline but no worsening since last tx.    Side Effects  Denies from Spravato or Topamax.    Past Medical History:  Past Medical History:   Diagnosis Date   • Anxiety      Social History:  Social History     Socioeconomic History   • Marital status: Single   Tobacco Use   • Smoking status: Former Smoker   • Smokeless tobacco: Never Used   Vaping Use   • Vaping Use: Never used   Substance and Sexual Activity   • Alcohol use: Yes     Comment: occ wine   • Drug use: Not Currently   • Sexual activity: Defer     Family History:  Family History   Problem Relation Age of Onset   • Hypertension Mother    • Coronary artery disease Mother         CABG x4 " in late 30s   • Depression Mother         mother committed suicide   • Hypertension Maternal Grandmother    • Diabetes Maternal Grandmother    • Depression Maternal Grandmother         committed suicide   • Hypertension Maternal Grandfather    • Diabetes Maternal Grandfather    • Heart attack Maternal Grandfather    • Depression Maternal Cousin         committed suicide   • Depression Maternal Aunt         committed suicide     Past Surgical History:  Past Surgical History:   Procedure Laterality Date   • LEEP  1997   • TUBAL ABDOMINAL LIGATION Bilateral 2015     Problem List:  Patient Active Problem List   Diagnosis   • Seasonal allergies   • Reduced libido   • Class 2 severe obesity due to excess calories with serious comorbidity and body mass index (BMI) of 38.0 to 38.9 in adult ([facility])   • Recurrent major depressive disorder, in partial remission ([facility])   • Essential hypertension   • Hot flashes due to menopause   • Generalized anxiety disorder with panic attacks   • Varicose veins of lower extremity   • Mixed hyperlipidemia   • Chest pain   • Epigastric pain   • Diffuse arthralgia   • Other insomnia   • Bipolar II disorder ([facility])     Allergy:   Allergies   Allergen Reactions   • Banana Nausea Only   • Kiwi Extract Nausea Only   • Penicillin V Potassium Swelling   • Milk Thistle Other (See Comments)      Discontinued Medications:  Medications Discontinued During This Encounter   Medication Reason   • traZODone (DESYREL) 100 MG tablet Not Efficacious      Current Medications:   Current Outpatient Medications   Medication Sig Dispense Refill   • allopurinol (Zyloprim) 100 MG tablet Take 1 tablet by mouth 2 (Two) Times a Day. 180 tablet 1   • atorvastatin (LIPITOR) 20 MG tablet Take 1 tablet by mouth Every Night. 90 tablet 1   • buPROPion XL (Wellbutrin XL) 300 MG 24 hr tablet Take 1 tablet by mouth Daily. 90 tablet 1   • celecoxib (CeleBREX) 200 MG capsule Take 1 capsule by mouth 2 (Two) Times a Day As Needed for  Moderate Pain . Take with food! 60 capsule 5   • clindamycin (CLINDAGEL) 1 % gel APPLY PEA SIZED AMOUNT TOPICALLY TO THE AFFECTED AREA EVERY MORNING     • Esketamine HCl, 84 MG Dose, (Spravato, 84 MG Dose,) Nasal Solution 6 sprays into the nostril(s) as directed by provider 1 (One) Time Per Week. 4 each 2   • fluticasone (FLONASE) 50 MCG/ACT nasal spray 1 spray into the nostril(s) as directed by provider Daily As Needed for Rhinitis or Allergies. DO NOT SNIFF! 48 g 1   • levocetirizine (Xyzal) 5 MG tablet Take 1 tablet by mouth Every Evening. 90 tablet 1   • lisinopril (PRINIVIL,ZESTRIL) 10 MG tablet TAKE 1 TABLET BY MOUTH DAILY 90 tablet 1   • omeprazole (priLOSEC) 40 MG capsule Take 40 mg by mouth Daily.     • temazepam (Restoril) 7.5 MG capsule Take 1 capsule by mouth At Night As Needed for Sleep or Anxiety. 30 capsule 0   • topiramate (Topamax) 50 MG tablet Take 1 tablet by mouth 2 (Two) Times a Day. 60 tablet 2   • tretinoin (RETIN-A) 0.05 % cream APPLY PEA SIZED AMOUNT TOPICALLY TO THE AFFECTED AREA AT BEDTIME. START 2 NIGHTS EVERY WEEK. INCREASE AS TOLERATED     • triamcinolone (KENALOG) 0.1 % ointment Apply 0.1 application topically to the appropriate area as directed 2 (Two) Times a Day.       Current Facility-Administered Medications   Medication Dose Route Frequency Provider Last Rate Last Admin   • Esketamine HCl (84 MG Dose) Nasal Solution 84 mg  84 mg Nasal Once per day on Mon Thu Jaleel Byers PA-C   84 mg at 03/28/22 1511     Psychological ROS: positive for - some anx/dep w/ anhedonia, concentration difficulties, memory difficulties, some psychomotor depression, fatigue, sleep disturbances, appetite change.  negative for - disorientation, hallucinations or hostility, mood swings, irritability, SI.    Physical Exam:  BP Prior to tx: 118/72  BP 40 min into tx: 130/79  BP prior to discharge: 117/74    Start time of observation: 1:03 PM  End time of observation: 3:06 PM    Mental Status Exam:    Orientation:  To person, Place, Time and Situation  Memory: Recent and remote memory Intact  Mood/Affect: Slightly depressed but stable and improved.  Suicidal Ideations: Denies  Homicidal Ideations:  None  Hallucinations: None  Delusions:  None  Obsessions: None  Behavior and Psychomotor Activity: Appropriate  Speech: Minimal  Thought Process: Goal directed  Associations: Intact  Thought Content: mood congruent   Language: name objects and repeat phrases  Concentration and computation: Fair  Attention Span: Fair  Fund of Knowledge: Fair  Reliability: fair  Insight into mental health: Fair  Judgement: Fair  Impulse Control:  Fair  Hygiene: Good  Cooperation:  Cooperative  Eye Contact: Fair  Physical/Medical Issues:  Yes HLD, obesity, HTN, GERD.     MSE reviewed and accepted with no needed changes from the previous visit.    PHQ-9 Depression Screening: Table won't populate.  Little interest or pleasure in doing things?     Feeling down, depressed, or hopeless?     Trouble falling or staying asleep, or sleeping too much?     Feeling tired or having little energy?     Poor appetite or overeating?     Feeling bad about yourself - or that you are a failure or have let yourself or your family down?     Trouble concentrating on things, such as reading the newspaper or watching television?     Moving or speaking so slowly that other people could have noticed? Or the opposite - being so fidgety or restless that you have been moving around a lot more than usual?     Thoughts that you would be better off dead, or of hurting yourself in some way?  0   PHQ-9 Total Score  11   If you checked off any problems, how difficult have these problems made it for you to do your work, take care of things at home, or get along with other people?     Feeling nervous, anxious or on edge: More than half the days  Not being able to stop or control worrying: Several days  Worrying too much about different things: More than half the  "days  Trouble Relaxing: More than half the days  Being so restless that it is hard to sit still: More than half the days  Feeling afraid as if something awful might happen: Not at all  Becoming easily annoyed or irritable: Several days  ISIDRO 7 Total Score: 10  If you checked any problems, how difficult have these problems made it for you to do your work, take care of things at home, or get along with other people: Somewhat difficult  PHQ-9: 11; moderate depression. Previously: 11, 9, 9, 9, 13, 12, 9, 11, 27, 16, 15, 20, and 20s pre-Spravato.  ISIDRO-7: 10; moderate anxiety. Previously: 7, 7, 7, 9, 14, 7, 7, 6, 4, 7, 3, 20s pre-Spravato.  MDQ: Negative; likely positive. \"minor problem\" but 9 sx that co-occurred on MDQ. (10/18/21)    Former Smoker  I advised Hyun of the risks of tobacco use.     Assessment/Plan   Diagnoses and all orders for this visit:    1. Recurrent major depressive disorder, in partial remission (HCC) (Primary)    2. Other insomnia  -     temazepam (Restoril) 7.5 MG capsule; Take 1 capsule by mouth At Night As Needed for Sleep or Anxiety.  Dispense: 30 capsule; Refill: 0    3. Generalized anxiety disorder with panic attacks    PHQ-9: 11; moderate depression. Previously: 11, 9, 9, 9, 13, 12, 9, 11, 27, 16, 15, 20, and 20s pre-Spravato.  ISIDRO-7: 10; moderate anxiety. Previously: 7, 7, 7, 9, 14, 7, 7, 6, 4, 7, 3, 20s pre-Spravato.  MDQ: Negative; likely positive. \"minor problem\" but 9 sx that co-occurred on MDQ. (10/18/21)    -Cont 84mg Spravato at once weekly rate to continue benefit on depression/anxiety, will soon consider tapering to every other week.    -Start 7.5mg Restoril for insomnia.  -d/c 100mg Trazodone due to inefficacy.  -Cont. 50mg Topamax BID for mood stabilization.   -Cont. 300mg Wellbutin daily for depression.  -Counseling continues to be encouraged.  -F/U in 1w for next Spravato tx. See if pt got back in touch with atheist friendly therapist, monitor improvement with Spravato and " topamax.    -In the future, continue to monitor sodium and wt.    Visit Diagnoses:    ICD-10-CM ICD-9-CM   1. Recurrent major depressive disorder, in partial remission (HCC)  F33.41 296.35   2. Other insomnia  G47.09 780.52   3. Generalized anxiety disorder with panic attacks  F41.1 300.02    F41.0 300.01      TREATMENT PLAN/GOALS: In the short-term, the patient is to continue supportive psychotherapy efforts and medications as indicated. Treatment and medication options were discussed during today's visit. Long-term the goal will be to control symptoms so they do not negatively interfere with other aspects of the patient's life. Prognosis is optimistic with implementation of the current treatment plan. Patient ackowledged and verbally consented to the treatment plan and was educated on the importance of compliance with treatment and follow-up appointments.    MEDICATION ISSUES:  INSPECT reviewed as expected. On 84mg dose pack of Spravato.     MEDS ORDERED DURING VISIT:  New Medications Ordered This Visit   Medications   • temazepam (Restoril) 7.5 MG capsule     Sig: Take 1 capsule by mouth At Night As Needed for Sleep or Anxiety.     Dispense:  30 capsule     Refill:  0     Return in about 1 week (around 4/4/2022) for Next scheduled follow up.     IJaleel PA-C, was present in the office suite and immediately available to furnish assistance and direction throughout the entire observation time.     Patient tolerated the procedure well without complications..     Side effects of treatment were mild and included sedation, dizziness, mild euphoria, and mild BP elevation.    After the observation time, the patient was assessed by me and considered stable to leave the office with assistance. Hyun Perez  was advised not to drive or operate machinery until tomorrow following a full night's sleep.    Jaleel Byers PA-C  03/29/22   12:26 EST      This document has been electronically signed by  Jaleel Byers PA-C  March 29, 2022 08:00 EDT    EMR Dragon transcription disclaimer:  Part of this note may be an electronic transcription/translation of spoken language to printed text using the Dragon Dictation System.

## 2022-03-29 ENCOUNTER — HOSPITAL ENCOUNTER (OUTPATIENT)
Dept: MAMMOGRAPHY | Facility: HOSPITAL | Age: 42
Discharge: HOME | End: 2022-03-29
Admitting: FAMILY MEDICINE

## 2022-03-29 DIAGNOSIS — Z12.31 ENCOUNTER FOR SCREENING MAMMOGRAM FOR MALIGNANT NEOPLASM OF BREAST: ICD-10-CM

## 2022-03-29 PROCEDURE — 77063 BREAST TOMOSYNTHESIS BI: CPT

## 2022-03-29 PROCEDURE — 77067 SCR MAMMO BI INCL CAD: CPT

## 2022-04-04 ENCOUNTER — OFFICE VISIT (OUTPATIENT)
Dept: PSYCHIATRY | Facility: CLINIC | Age: 42
End: 2022-04-04

## 2022-04-04 DIAGNOSIS — F33.2 SEVERE EPISODE OF RECURRENT MAJOR DEPRESSIVE DISORDER, WITHOUT PSYCHOTIC FEATURES: Chronic | ICD-10-CM

## 2022-04-04 DIAGNOSIS — F41.0 GENERALIZED ANXIETY DISORDER WITH PANIC ATTACKS: Chronic | ICD-10-CM

## 2022-04-04 DIAGNOSIS — M25.50 DIFFUSE ARTHRALGIA: ICD-10-CM

## 2022-04-04 DIAGNOSIS — M10.9 GOUTY ARTHRITIS: ICD-10-CM

## 2022-04-04 DIAGNOSIS — F33.41 RECURRENT MAJOR DEPRESSIVE DISORDER, IN PARTIAL REMISSION: Primary | Chronic | ICD-10-CM

## 2022-04-04 DIAGNOSIS — F41.1 GENERALIZED ANXIETY DISORDER WITH PANIC ATTACKS: Chronic | ICD-10-CM

## 2022-04-04 DIAGNOSIS — G47.09 OTHER INSOMNIA: ICD-10-CM

## 2022-04-04 PROCEDURE — 99416 PROLNG CLIN STAFF SVC EA ADD: CPT

## 2022-04-04 PROCEDURE — 99214 OFFICE O/P EST MOD 30 MIN: CPT

## 2022-04-04 PROCEDURE — 99415 PROLNG CLIN STAFF SVC 1ST HR: CPT

## 2022-04-04 RX ORDER — LAMOTRIGINE 50 MG/1
50 TABLET, EXTENDED RELEASE ORAL EVERY MORNING
Qty: 30 TABLET | Refills: 1 | OUTPATIENT
Start: 2022-04-04

## 2022-04-04 NOTE — PROGRESS NOTES
Subjective   Hyun Perez is a 42 y.o. female who presents today for Spravato tx #25    Chief Complaint:  Treatment Resistant Depression and Anxiety    History of Present Illness:  Prior to Spravato:  -Hx of tx resistent depression failing numerous medications over many years, and insomnia:.  -Significant FmHx of successful SA in mother, grandmother and extended family. Her mother passed away early 2021.   -FmHx of bipolar d/o w/o diagnosis herself. Admits to 9 possibly manic symptoms on MDQ.  -On 300mg Wellbutrin daily for depression with little to no efficacy.  -Struggles with SI daily without a plan or anticipating actions.  -Vraylar caused minimal improvements and worsened irritability, also caused oversedating in the day.  -Been reluctant to counseling due to being an atheist and fearing discrimination and judgement.  -Not been using Trazodone.  -Previously c/o tinnitis causing insomnia.    1/10/22-3/28/22: See previous notes.    4/4/22: Restoril didn't work for insomnia unless she took 4 pills. Has cancer scare after recent mammogram showing calcifications, which has not yet been confirmed as cancer with repeat mammogram and needle biopsy scheduled for later this week. Pt reports anx/dep are worse because of this but is somewhat resistant to changing medications. The Spravato dose kicked in and caused insomnia during our encounter that prevented us from completing medication management discussion at this visit. C/o occasional SI w/o plans or anticipated actions, but denies AVH or HI.    The following portions of the patient's history were reviewed and updated as appropriate: allergies, current medications, past family history, past medical history, past social history, past surgical history and problem list.    PAST PSYCHIATRIC HISTORY  Diagnosis treated:  Affective/Bipolar Disorder, Anxiety/Panic Disorder, ADHD dx in childhood.  Treatment Type:  Medication Management  Prior Psychiatric  "Medications:  Bupropion - takes edge off a little a bit.  prozac - inefficacy.  depakote - inefficacy.  seroquel - inefficacy.  zoloft - inefficacy.  paxil - inefficacy.  Duloxetine - inefficacy.  3mg Vraylar - oversedating and caused sugar cravings.  Trazodone - effective at 100mg nightly; makes her feel \"foggy\" and tired.  Lamictal - night sweats and sugar cravings.  Singulair - dep and SI  Spravato - effective at improving and maintaining depression remission.  Trazodone - ineffective for sleep.  *Others the patient cannot recall the names of.  Support Groups:  None  Sequelae Of Mental Disorder:  job disruption, social isolation, family disruption, financial hardships, emotional distress.    Interval History  Worsened in context of stress from recent health scare.    Side Effects  Denies from Spravato, Topamax, or Restoril.    Past Medical History:  Past Medical History:   Diagnosis Date   • Anxiety      Social History:  Social History     Socioeconomic History   • Marital status: Single   Tobacco Use   • Smoking status: Former Smoker   • Smokeless tobacco: Never Used   Vaping Use   • Vaping Use: Never used   Substance and Sexual Activity   • Alcohol use: Yes     Comment: occ wine   • Drug use: Not Currently   • Sexual activity: Defer     Family History:  Family History   Problem Relation Age of Onset   • Hypertension Mother    • Coronary artery disease Mother         CABG x4 in late 30s   • Depression Mother         mother committed suicide   • Hypertension Maternal Grandmother    • Diabetes Maternal Grandmother    • Depression Maternal Grandmother         committed suicide   • Hypertension Maternal Grandfather    • Diabetes Maternal Grandfather    • Heart attack Maternal Grandfather    • Depression Maternal Cousin         committed suicide   • Depression Maternal Aunt         committed suicide     Past Surgical History:  Past Surgical History:   Procedure Laterality Date   • GILMER  1997   • TUBAL ABDOMINAL " LIGATION Bilateral 2015     Problem List:  Patient Active Problem List   Diagnosis   • Seasonal allergies   • Reduced libido   • Class 2 severe obesity due to excess calories with serious comorbidity and body mass index (BMI) of 38.0 to 38.9 in adult (HCC)   • Recurrent major depressive disorder, in partial remission ([facility])   • Essential hypertension   • Hot flashes due to menopause   • Generalized anxiety disorder with panic attacks   • Varicose veins of lower extremity   • Mixed hyperlipidemia   • Chest pain   • Epigastric pain   • Diffuse arthralgia   • Other insomnia   • Bipolar II disorder ([facility] [place])     Allergy:   Allergies   Allergen Reactions   • Banana Nausea Only   • Kiwi Extract Nausea Only   • Penicillin V Potassium Swelling   • Milk Thistle Other (See Comments)      Discontinued Medications:  There are no discontinued medications.   Current Medications:   Current Outpatient Medications   Medication Sig Dispense Refill   • allopurinol (Zyloprim) 100 MG tablet Take 1 tablet by mouth 2 (Two) Times a Day. 180 tablet 1   • atorvastatin (LIPITOR) 20 MG tablet Take 1 tablet by mouth Every Night. 90 tablet 1   • buPROPion XL (Wellbutrin XL) 300 MG 24 hr tablet Take 1 tablet by mouth Daily. 90 tablet 1   • celecoxib (CeleBREX) 200 MG capsule Take 1 capsule by mouth 2 (Two) Times a Day As Needed for Moderate Pain . Take with food! 60 capsule 5   • clindamycin (CLINDAGEL) 1 % gel APPLY PEA SIZED AMOUNT TOPICALLY TO THE AFFECTED AREA EVERY MORNING     • Esketamine HCl, 84 MG Dose, (Spravato, 84 MG Dose,) Nasal Solution 6 sprays into the nostril(s) as directed by provider 1 (One) Time Per Week. 4 each 2   • fluticasone (FLONASE) 50 MCG/ACT nasal spray 1 spray into the nostril(s) as directed by provider Daily As Needed for Rhinitis or Allergies. DO NOT SNIFF! 48 g 1   • levocetirizine (Xyzal) 5 MG tablet Take 1 tablet by mouth Every Evening. 90 tablet 1   • lisinopril (PRINIVIL,ZESTRIL) 10 MG tablet TAKE 1 TABLET BY MOUTH  DAILY 90 tablet 1   • omeprazole (priLOSEC) 40 MG capsule Take 40 mg by mouth Daily.     • temazepam (Restoril) 7.5 MG capsule Take 1 capsule by mouth At Night As Needed for Sleep or Anxiety. 30 capsule 0   • topiramate (Topamax) 50 MG tablet Take 1 tablet by mouth 2 (Two) Times a Day. 60 tablet 2   • tretinoin (RETIN-A) 0.05 % cream APPLY PEA SIZED AMOUNT TOPICALLY TO THE AFFECTED AREA AT BEDTIME. START 2 NIGHTS EVERY WEEK. INCREASE AS TOLERATED     • triamcinolone (KENALOG) 0.1 % ointment Apply 0.1 application topically to the appropriate area as directed 2 (Two) Times a Day.       Current Facility-Administered Medications   Medication Dose Route Frequency Provider Last Rate Last Admin   • Esketamine HCl (84 MG Dose) Nasal Solution 84 mg  84 mg Nasal Once per day on Mon Thu Jaleel Byers PA-C   84 mg at 04/04/22 1522     Psychological ROS: positive for - some anx/dep w/ anhedonia, concentration difficulties, memory difficulties, some psychomotor depression, fatigue, sleep disturbances, appetite change, SI.  negative for - disorientation, hallucinations, hostility, mood swings, irritability.    Physical Exam:  BP Prior to tx: 126/81  BP 40 min into tx: 146/91  BP prior to discharge: 132/90    Start time of observation: 1:08 PM  End time of observation: 3:10 PM    Mental Status Exam:   Orientation:  To person, Place, Time and Situation  Memory: Recent and remote memory Intact  Mood/Affect: Depressed and anxious  Suicidal Ideations: Infrequent SI w/o plans or anticipated actions.  Homicidal Ideations:  None  Hallucinations: None  Delusions:  None  Obsessions: None  Behavior and Psychomotor Activity: Appropriate  Speech: Normal, rapid, pressured.  Thought Process: Goal directed and linear.  Associations: Intact  Thought Content: mood congruent   Language: name objects and repeat phrases  Concentration and computation: Fair  Attention Span: Fair  Fund of Knowledge: Fair  Reliability: fair  Insight into  mental health: Fair  Judgement: Fair  Impulse Control:  Fair  Hygiene: Good  Cooperation:  Cooperative  Eye Contact: Fair  Physical/Medical Issues:  Yes HLD, obesity, HTN, GERD.     MSE reviewed and accepted with changes from the previous visit.    PHQ-9 Depression Screening:  Little interest or pleasure in doing things?     Feeling down, depressed, or hopeless?     Trouble falling or staying asleep, or sleeping too much?     Feeling tired or having little energy?     Poor appetite or overeating?     Feeling bad about yourself - or that you are a failure or have let yourself or your family down?     Trouble concentrating on things, such as reading the newspaper or watching television?     Moving or speaking so slowly that other people could have noticed? Or the opposite - being so fidgety or restless that you have been moving around a lot more than usual?     Thoughts that you would be better off dead, or of hurting yourself in some way?  1   PHQ-9 Total Score  18   If you checked off any problems, how difficult have these problems made it for you to do your work, take care of things at home, or get along with other people?     Feeling nervous, anxious or on edge: Nearly every day  Not being able to stop or control worrying: Nearly every day  Worrying too much about different things: Nearly every day  Trouble Relaxing: Nearly every day  Being so restless that it is hard to sit still: Nearly every day  Feeling afraid as if something awful might happen: Nearly every day  Becoming easily annoyed or irritable: Not at all  ISIDRO 7 Total Score: 18  If you checked any problems, how difficult have these problems made it for you to do your work, take care of things at home, or get along with other people: Very difficult  PHQ-9: 18; moderately-severe depression. Previously: 11X2, 9X3, 13, 12, 9, 11, 27, 16, 15, 20, and 20s pre-Spravato.  ISIDRO-7: 18; severe anxiety. Previously: 10, 7X3, 9, 14, 7X2, 6, 4, 7, 3, 20s  "pre-Spravato.  MDQ: Negative likely positive. \"minor problem\" but 9 sx that co-occurred on MDQ. (10/18/21)    Former Smoker  I advised Hyun of the risks of tobacco use.     Assessment/Plan   Diagnoses and all orders for this visit:    1. Recurrent major depressive disorder, in partial remission (HCC) (Primary)    2. Generalized anxiety disorder with panic attacks    3. Other insomnia    PHQ-9: 18; moderately-severe depression. Previously: 11X2, 9X3, 13, 12, 9, 11, 27, 16, 15, 20, and 20s pre-Spravato.  ISIDRO-7: 18; severe anxiety. Previously: 10, 7X3, 9, 14, 7X2, 6, 4, 7, 3, 20s pre-Spravato.  MDQ: Negative likely positive. \"minor problem\" but 9 sx that co-occurred on MDQ. (10/18/21)    -Cont 84mg Spravato at once weekly rate to continue benefit on depression/anxiety, will soon consider tapering to every other week.    -Consider increasing dose pt 30mg Restoril nightly for insomnia at the next visit since pt reports needing this dose to provide sleep benefit.  -Cont. 50mg Topamax BID for mood stabilization.  -Cont. 300mg Wellbutin daily for depression.  -Counseling continues to be encouraged.  -F/U in 1w for next Spravato tx. See if pt got back in touch with atheist friendly therapist, monitor improvement with Spravato and topamax.    -In the future, continue to monitor sodium and wt.    Visit Diagnoses:    ICD-10-CM ICD-9-CM   1. Recurrent major depressive disorder, in partial remission (HCC)  F33.41 296.35   2. Generalized anxiety disorder with panic attacks  F41.1 300.02    F41.0 300.01   3. Other insomnia  G47.09 780.52      TREATMENT PLAN/GOALS: In the short-term, the patient is to continue supportive psychotherapy efforts and medications as indicated. Treatment and medication options were discussed during today's visit. Long-term the goal will be to control symptoms so they do not negatively interfere with other aspects of the patient's life. Prognosis is optimistic with implementation of the current treatment " plan. Patient ackowledged and verbally consented to the treatment plan and was educated on the importance of compliance with treatment and follow-up appointments.    MEDICATION ISSUES:  INSPECT reviewed as expected. On 84mg dose pack of Spravato.     MEDS ORDERED DURING VISIT:  No orders of the defined types were placed in this encounter.    Return in about 1 week (around 4/11/2022) for Next scheduled follow up.     I, Jaleel Byers PA-C, was present in the office suite and immediately available to furnish assistance and direction throughout the entire observation time.     Patient tolerated the procedure well without complications..     Side effects of treatment were mild and included sedation, dizziness, mild euphoria, and mild BP elevation.    After the observation time, the patient was assessed by me and considered stable to leave the office with assistance. Hyun Perez  was advised not to drive or operate machinery until tomorrow following a full night's sleep.    Jaleel Byers PA-C  04/04/22   12:26 EST      This document has been electronically signed by Jaleel Byers PA-C  April 4, 2022 22:27 EDT    EMR Dragon transcription disclaimer:  Part of this note may be an electronic transcription/translation of spoken language to printed text using the Dragon Dictation System.

## 2022-04-04 NOTE — PROGRESS NOTES
Spravato Monitoring Note    Start time of observation: 1:08pm  BP check prior to administration: 126/81    Hyun Fatoualfredoleandra   presented 04/04/2022  for clinical monitoring of self administration of Spravato. The patient used a total of 3 devices, self-administered intranasally, with a 5-minute rest between each device.    Each device was checked by klb for appropriate expiration and that 2 green indicator dots were present prior to patient administration. Afterwards, the device was checked by klb to ensure the green indicator dots were gone and the full medication was delivered.    Patient check at 1:50pm with BP reading of 146/91  Patient check at 3:10pm with BP reading of 132/90    End time of observation: 3:10pm    Patient monitored for 2 hours Patient tolerated the procedure well without complications..       NDC [id number]  LOT [id number]  EXP 2023 Sep

## 2022-04-05 ENCOUNTER — HOSPITAL ENCOUNTER (OUTPATIENT)
Dept: MAMMOGRAPHY | Facility: HOSPITAL | Age: 42
Discharge: HOME | End: 2022-04-05
Admitting: FAMILY MEDICINE

## 2022-04-05 DIAGNOSIS — R92.8 ABNORMAL MAMMOGRAM OF RIGHT BREAST: ICD-10-CM

## 2022-04-05 DIAGNOSIS — R92.1 CALCIFICATION OF RIGHT BREAST: ICD-10-CM

## 2022-04-05 PROCEDURE — 77065 DX MAMMO INCL CAD UNI: CPT

## 2022-04-05 PROCEDURE — G0279 TOMOSYNTHESIS, MAMMO: HCPCS

## 2022-04-05 RX ORDER — CETIRIZINE HYDROCHLORIDE 10 MG/1
TABLET ORAL
Qty: 90 TABLET | Refills: 1 | OUTPATIENT
Start: 2022-04-05

## 2022-04-05 RX ORDER — ALLOPURINOL 100 MG/1
TABLET ORAL
Qty: 180 TABLET | Refills: 1 | Status: SHIPPED | OUTPATIENT
Start: 2022-04-05 | End: 2022-09-27

## 2022-04-05 RX ORDER — CELECOXIB 200 MG/1
200 CAPSULE ORAL 2 TIMES DAILY PRN
Qty: 180 CAPSULE | Refills: 1 | Status: SHIPPED | OUTPATIENT
Start: 2022-04-05 | End: 2022-09-27

## 2022-04-05 NOTE — TELEPHONE ENCOUNTER
Please call patient and let her know that we received a refill request for her Zyrtec. Is she still taking this?

## 2022-04-11 ENCOUNTER — OFFICE VISIT (OUTPATIENT)
Dept: PSYCHIATRY | Facility: CLINIC | Age: 42
End: 2022-04-11

## 2022-04-11 DIAGNOSIS — G47.09 OTHER INSOMNIA: ICD-10-CM

## 2022-04-11 DIAGNOSIS — F41.1 GENERALIZED ANXIETY DISORDER WITH PANIC ATTACKS: Chronic | ICD-10-CM

## 2022-04-11 DIAGNOSIS — F33.41 RECURRENT MAJOR DEPRESSIVE DISORDER, IN PARTIAL REMISSION: Primary | Chronic | ICD-10-CM

## 2022-04-11 DIAGNOSIS — F41.0 GENERALIZED ANXIETY DISORDER WITH PANIC ATTACKS: Chronic | ICD-10-CM

## 2022-04-11 PROCEDURE — 99415 PROLNG CLIN STAFF SVC 1ST HR: CPT

## 2022-04-11 PROCEDURE — 99416 PROLNG CLIN STAFF SVC EA ADD: CPT

## 2022-04-11 PROCEDURE — 99214 OFFICE O/P EST MOD 30 MIN: CPT

## 2022-04-11 RX ORDER — ZOLPIDEM TARTRATE 5 MG/1
5 TABLET ORAL NIGHTLY PRN
Qty: 30 TABLET | Refills: 0 | Status: SHIPPED | OUTPATIENT
Start: 2022-04-11 | End: 2022-05-10

## 2022-04-11 RX ORDER — TOPIRAMATE 100 MG/1
100 TABLET, FILM COATED ORAL 2 TIMES DAILY
Qty: 60 TABLET | Refills: 0 | Status: SHIPPED | OUTPATIENT
Start: 2022-04-11 | End: 2022-04-28

## 2022-04-11 NOTE — PROGRESS NOTES
Spravato Monitoring Note    Start time of observation: 1:05pm  BP check prior to administration: 130/83    Hyun Perez   presented 04/11/2022  for clinical monitoring of self administration of Spravato. The patient used a total of 3 devices, self-administered intranasally, with a 5-minute rest between each device.    Each device was checked by klb for appropriate expiration and that 2 green indicator dots were present prior to patient administration. Afterwards, the device was checked by klb to ensure the green indicator dots were gone and the full medication was delivered.    Patient check at 1:46pm with BP reading of 149/91  Patient check at 3:06pm with BP reading of 127/83    End time of observation: 3:06pm    Patient monitored for 2 hours Patient tolerated the procedure well without complications..       NDC [id number]  LOT [id number]  EXP 2023 Sep

## 2022-04-11 NOTE — PROGRESS NOTES
Subjective   Hyun Perez is a 42 y.o. female who presents today for Spravato tx #26    Chief Complaint:  Treatment Resistant Depression and Anxiety; Insomnia.    History of Present Illness:  Prior to Spravato:  -Hx of tx resistent depression failing numerous medications over many years, and insomnia:.  -Significant FmHx of successful SA in mother, grandmother and extended family. Her mother passed away early 2021.   -FmHx of bipolar d/o w/o diagnosis herself. Admits to 9 possibly manic symptoms on MDQ.  -On 300mg Wellbutrin daily for depression with little to no efficacy.  -Struggles with SI daily without a plan or anticipating actions.  -Vraylar caused minimal improvements and worsened irritability, also caused oversedating in the day.  -Been reluctant to counseling due to being an atheist and fearing discrimination and judgement.  -Not been using Trazodone.  -Previously c/o tinnitis causing insomnia.    1/10/22-3/28/22: See previous notes.    4/4/22: Restoril didn't work for insomnia unless she took 4 pills. Has cancer scare after recent mammogram showing calcifications, which has not yet been confirmed as cancer with repeat mammogram and needle biopsy scheduled for later this week. Pt reports anx/dep are worse because of this but is somewhat resistant to changing medications. The Spravato dose kicked in and caused insomnia during our encounter that prevented us from completing medication management discussion at this visit. C/o occasional SI w/o plans or anticipated actions, but denies AVH or HI.    4/11/22: c/o continued sleep difficulty, and would prefer to change to a different sleep medication. She has tried Ambien in the past for sleep and she would like to go to that since she knows it work. Says she never had a problem stopping it in the past or becoming addicted to it. She isn't certain of the effective dose used.  -Hasn't been using Restoril because of the high dose needed to be effective, but  "difficulty with insomnia has persisted as a result.  -Continues to feel she is getting benefit with Spravato once weekly, and is tolerating tx well without concerning side effects btw treatments or during the txs.  -Also feeling better since her fears of possible breast cancer have resolved, and she is believed to have fibrous breasts that will require monitor with mammograms for changes, but no current concerns.  -Now that she isn't feeling as depressed she has a new outlook on life and is thinking more about things she would like to do.  -Feels she has had benefit from Topamax, and would like to increase the dose.  -Denies worsening of depression or worsening of anxiety between treatments.  -Denies AVH or SI/HI.    The following portions of the patient's history were reviewed and updated as appropriate: allergies, current medications, past family history, past medical history, past social history, past surgical history and problem list.    PAST PSYCHIATRIC HISTORY  Diagnosis treated:  Affective/Bipolar Disorder, Anxiety/Panic Disorder, ADHD dx in childhood.  Treatment Type:  Medication Management  Prior Psychiatric Medications:  Bupropion - takes edge off a little a bit.  prozac - inefficacy.  depakote - inefficacy.  seroquel - inefficacy.  zoloft - inefficacy.  paxil - inefficacy.  Duloxetine - inefficacy.  3mg Vraylar - oversedating and caused sugar cravings.  Trazodone - effective at 100mg nightly; makes her feel \"foggy\" and tired.  Lamictal - night sweats and sugar cravings.  Singulair - dep and SI  Spravato - effective at improving and maintaining depression remission.  Trazodone - ineffective for sleep.  Restoril - only effective at 30mg nightly.  *Others the patient cannot recall the names of.  Support Groups:  None  Sequelae Of Mental Disorder:  job disruption, social isolation, family disruption, financial hardships, emotional distress.    Interval History  Improved improved since breast cancer scare " resolved.  No change in depression, but stable.  No change in insomnia, but hasn't been using Restoril.    Side Effects  Denies from Spravato, Topamax, or Restoril.    Past Medical History:  Past Medical History:   Diagnosis Date   • Anxiety      Social History:  Social History     Socioeconomic History   • Marital status: Single   Tobacco Use   • Smoking status: Former Smoker   • Smokeless tobacco: Never Used   Vaping Use   • Vaping Use: Never used   Substance and Sexual Activity   • Alcohol use: Yes     Comment: occ wine   • Drug use: Not Currently   • Sexual activity: Defer     Family History:  Family History   Problem Relation Age of Onset   • Hypertension Mother    • Coronary artery disease Mother         CABG x4 in late 30s   • Depression Mother         mother committed suicide   • Hypertension Maternal Grandmother    • Diabetes Maternal Grandmother    • Depression Maternal Grandmother         committed suicide   • Hypertension Maternal Grandfather    • Diabetes Maternal Grandfather    • Heart attack Maternal Grandfather    • Depression Maternal Cousin         committed suicide   • Depression Maternal Aunt         committed suicide     Past Surgical History:  Past Surgical History:   Procedure Laterality Date   • LEEP  1997   • TUBAL ABDOMINAL LIGATION Bilateral 2015     Problem List:  Patient Active Problem List   Diagnosis   • Seasonal allergies   • Reduced libido   • Class 2 severe obesity due to excess calories with serious comorbidity and body mass index (BMI) of 38.0 to 38.9 in adult ([facility])   • Recurrent major depressive disorder, in partial remission ([facility])   • Essential hypertension   • Hot flashes due to menopause   • Generalized anxiety disorder with panic attacks   • Varicose veins of lower extremity   • Mixed hyperlipidemia   • Chest pain   • Epigastric pain   • Diffuse arthralgia   • Other insomnia   • Bipolar II disorder ([facility])     Allergy:   Allergies   Allergen Reactions   • Banana Nausea Only   •  Kiwi Extract Nausea Only   • Penicillin V Potassium Swelling   • Milk Thistle Other (See Comments)      Discontinued Medications:  Medications Discontinued During This Encounter   Medication Reason   • temazepam (Restoril) 7.5 MG capsule Alternate therapy   • topiramate (Topamax) 50 MG tablet Dose adjustment      Current Medications:   Current Outpatient Medications   Medication Sig Dispense Refill   • allopurinol (ZYLOPRIM) 100 MG tablet TAKE 1 TABLET BY MOUTH TWICE DAILY 180 tablet 1   • atorvastatin (LIPITOR) 20 MG tablet Take 1 tablet by mouth Every Night. 90 tablet 1   • buPROPion XL (Wellbutrin XL) 300 MG 24 hr tablet Take 1 tablet by mouth Daily. 90 tablet 1   • celecoxib (CeleBREX) 200 MG capsule Take 1 capsule by mouth 2 (Two) Times a Day As Needed for Moderate Pain . Take with food! 180 capsule 1   • clindamycin (CLINDAGEL) 1 % gel APPLY PEA SIZED AMOUNT TOPICALLY TO THE AFFECTED AREA EVERY MORNING     • Esketamine HCl, 84 MG Dose, (Spravato, 84 MG Dose,) Nasal Solution 6 sprays into the nostril(s) as directed by provider 1 (One) Time Per Week. 4 each 2   • fluticasone (FLONASE) 50 MCG/ACT nasal spray 1 spray into the nostril(s) as directed by provider Daily As Needed for Rhinitis or Allergies. DO NOT SNIFF! 48 g 1   • levocetirizine (Xyzal) 5 MG tablet Take 1 tablet by mouth Every Evening. 90 tablet 1   • lisinopril (PRINIVIL,ZESTRIL) 10 MG tablet TAKE 1 TABLET BY MOUTH DAILY 90 tablet 1   • omeprazole (priLOSEC) 40 MG capsule Take 40 mg by mouth Daily.     • topiramate (Topamax) 100 MG tablet Take 1 tablet by mouth 2 (Two) Times a Day. Take half (50mg) in the the morning and 100mg nightly for 1 week before 100mg twice daily. 60 tablet 0   • tretinoin (RETIN-A) 0.05 % cream APPLY PEA SIZED AMOUNT TOPICALLY TO THE AFFECTED AREA AT BEDTIME. START 2 NIGHTS EVERY WEEK. INCREASE AS TOLERATED     • triamcinolone (KENALOG) 0.1 % ointment Apply 0.1 application topically to the appropriate area as directed 2 (Two)  Times a Day.     • zolpidem (Ambien) 5 MG tablet Take 1 tablet by mouth At Night As Needed for Sleep. 30 tablet 0     Current Facility-Administered Medications   Medication Dose Route Frequency Provider Last Rate Last Admin   • Esketamine HCl (84 MG Dose) Nasal Solution 84 mg  84 mg Nasal Once per day on Mon Thu Jlaeel Byers PA-C   84 mg at 04/18/22 1559     Psychological ROS: positive for - some anx/dep w/ anhedonia, concentration difficulties, memory difficulties, some psychomotor depression, fatigue, insomnia, appetite change.  negative for - disorientation, hallucinations, hostility, mood swings, irritability, or SI    Physical Exam:  BP Prior to tx: 130/83  BP 40 min into tx: 149/91  BP prior to discharge: 127/83    Start time of observation: 1:05 PM  End time of observation: 3:06 PM    Mental Status Exam:   Orientation:  To person, Place, Time and Situation  Memory: Recent and remote memory Intact  Mood/Affect: mildly depressed and anxious, improved and stable.  Suicidal Ideations: Denied  Homicidal Ideations:  None  Hallucinations: None  Delusions:  None  Obsessions: None  Behavior and Psychomotor Activity: Appropriate  Speech: Normal  Thought Process: Goal directed and linear.  Associations: Intact  Thought Content: mood congruent   Language: name objects and repeat phrases  Concentration and computation: Good  Attention Span: Good  Fund of Knowledge: Fair  Reliability: fair  Insight into mental health: Fair  Judgement: Fair  Impulse Control:  Fair  Hygiene: Good  Cooperation:  Cooperative  Eye Contact: Fair  Physical/Medical Issues:  Yes HLD, obesity, HTN, GERD.     MSE reviewed and accepted with changes from the previous visit.    PHQ-9 Depression Screening:  Little interest or pleasure in doing things?     Feeling down, depressed, or hopeless?     Trouble falling or staying asleep, or sleeping too much?     Feeling tired or having little energy?     Poor appetite or overeating?     Feeling bad  "about yourself - or that you are a failure or have let yourself or your family down?     Trouble concentrating on things, such as reading the newspaper or watching television?     Moving or speaking so slowly that other people could have noticed? Or the opposite - being so fidgety or restless that you have been moving around a lot more than usual?     Thoughts that you would be better off dead, or of hurting yourself in some way?  0   PHQ-9 Total Score  12   If you checked off any problems, how difficult have these problems made it for you to do your work, take care of things at home, or get along with other people?     Feeling nervous, anxious or on edge: Several days  Not being able to stop or control worrying: Several days  Worrying too much about different things: Several days  Trouble Relaxing: More than half the days  Being so restless that it is hard to sit still: More than half the days  Feeling afraid as if something awful might happen: Not at all  Becoming easily annoyed or irritable: Not at all  ISIDRO 7 Total Score: 7  If you checked any problems, how difficult have these problems made it for you to do your work, take care of things at home, or get along with other people: Somewhat difficult  PHQ-9: 12; moderate dep. Previously: 18, 11X2, 9X3, 13, 12, 9, 11, 27, 16, 15, 20, and 20s pre-Spravato.  ISIDRO-7: 7; mild anx. Previously: 18, 10, 7X3, 9, 14, 7X2, 6, 4, 7, 3, 20s pre-Spravato.  MDQ: Negative likely positive. \"minor problem\" but 9 sx that co-occurred on MDQ. (10/18/21)    Former Smoker  I advised Hyun of the risks of tobacco use.     Assessment/Plan   Diagnoses and all orders for this visit:    1. Recurrent major depressive disorder, in partial remission (HCC) (Primary)  -     topiramate (Topamax) 100 MG tablet; Take 1 tablet by mouth 2 (Two) Times a Day. Take half (50mg) in the the morning and 100mg nightly for 1 week before 100mg twice daily.  Dispense: 60 tablet; Refill: 0    2. Generalized anxiety " "disorder with panic attacks  -     topiramate (Topamax) 100 MG tablet; Take 1 tablet by mouth 2 (Two) Times a Day. Take half (50mg) in the the morning and 100mg nightly for 1 week before 100mg twice daily.  Dispense: 60 tablet; Refill: 0    3. Other insomnia  -     zolpidem (Ambien) 5 MG tablet; Take 1 tablet by mouth At Night As Needed for Sleep.  Dispense: 30 tablet; Refill: 0    PHQ-9: 12; moderate dep. Previously: 18, 11X2, 9X3, 13, 12, 9, 11, 27, 16, 15, 20, and 20s pre-Spravato.  ISIDRO-7: 7; mild anx. Previously: 18, 10, 7X3, 9, 14, 7X2, 6, 4, 7, 3, 20s pre-Spravato.  MDQ: Negative likely positive. \"minor problem\" but 9 sx that co-occurred on MDQ. (10/18/21)    -Cont 84mg Spravato at once weekly rate to continue benefit on depression/anxiety, will soon consider tapering to every other week.    -d/c Restoril at pt request due to high dose needed.  -Start 5mg Ambien due to past tolerability and efficacy.  -^ to 100mg Topamax BID for mood stabilization and improved dep/anx. Pt told to take 50mg in the morning and 100mg at night for a week before going to 100mg BID.  -Cont. 300mg Wellbutin daily for depression.  -Counseling continues to be encouraged, but not obtained.  -F/U in 1w for next Spravato tx. See if pt got in touch with atheist friendly therapist, monitor for improvement with Spravato and topamax. See if 5mg Ambien effective for insomnia.    -In the future, continue to monitor sodium and wt.    Visit Diagnoses:    ICD-10-CM ICD-9-CM   1. Recurrent major depressive disorder, in partial remission (HCC)  F33.41 296.35   2. Generalized anxiety disorder with panic attacks  F41.1 300.02    F41.0 300.01   3. Other insomnia  G47.09 780.52      TREATMENT PLAN/GOALS: In the short-term, the patient is to continue supportive psychotherapy efforts and medications as indicated. Treatment and medication options were discussed during today's visit. Long-term the goal will be to control symptoms so they do not negatively " interfere with other aspects of the patient's life. Prognosis is optimistic with implementation of the current treatment plan. Patient ackowledged and verbally consented to the treatment plan and was educated on the importance of compliance with treatment and follow-up appointments.    MEDICATION ISSUES:  INSPECT reviewed as expected. On 84mg dose pack of Spravato.     MEDS ORDERED DURING VISIT:  New Medications Ordered This Visit   Medications   • zolpidem (Ambien) 5 MG tablet     Sig: Take 1 tablet by mouth At Night As Needed for Sleep.     Dispense:  30 tablet     Refill:  0   • topiramate (Topamax) 100 MG tablet     Sig: Take 1 tablet by mouth 2 (Two) Times a Day. Take half (50mg) in the the morning and 100mg nightly for 1 week before 100mg twice daily.     Dispense:  60 tablet     Refill:  0     Return in about 1 week (around 4/18/2022) for Next scheduled follow up.     IJaleel PA-C, was present in the office suite and immediately available to furnish assistance and direction throughout the entire observation time.     Patient tolerated the procedure well without complications..     Side effects of treatment were mild and included sedation, dizziness, mild euphoria, and mild BP elevation.    After the observation time, the patient was assessed by me and considered stable to leave the office with assistance. Hyun Perez  was advised not to drive or operate machinery until tomorrow following a full night's sleep.    Jaleel Byers PA-C  04/18/22   12:26 EST      This document has been electronically signed by Jaleel Byers PA-C  April 18, 2022 17:12 EDT    EMR Dragon transcription disclaimer:  Part of this note may be an electronic transcription/translation of spoken language to printed text using the Dragon Dictation System.

## 2022-04-18 ENCOUNTER — OFFICE VISIT (OUTPATIENT)
Dept: PSYCHIATRY | Facility: CLINIC | Age: 42
End: 2022-04-18

## 2022-04-18 DIAGNOSIS — F41.0 GENERALIZED ANXIETY DISORDER WITH PANIC ATTACKS: Primary | Chronic | ICD-10-CM

## 2022-04-18 DIAGNOSIS — F41.1 GENERALIZED ANXIETY DISORDER WITH PANIC ATTACKS: Primary | Chronic | ICD-10-CM

## 2022-04-18 DIAGNOSIS — F33.41 RECURRENT MAJOR DEPRESSIVE DISORDER, IN PARTIAL REMISSION: Chronic | ICD-10-CM

## 2022-04-18 DIAGNOSIS — G47.09 OTHER INSOMNIA: ICD-10-CM

## 2022-04-18 PROCEDURE — 99214 OFFICE O/P EST MOD 30 MIN: CPT | Performed by: PHYSICIAN ASSISTANT

## 2022-04-18 PROCEDURE — 99415 PROLNG CLIN STAFF SVC 1ST HR: CPT | Performed by: PHYSICIAN ASSISTANT

## 2022-04-18 PROCEDURE — 99416 PROLNG CLIN STAFF SVC EA ADD: CPT | Performed by: PHYSICIAN ASSISTANT

## 2022-04-18 NOTE — PROGRESS NOTES
Spravato Monitoring Note    Start time of observation: 1:13pm  BP check prior to administration: 101/71    Hyun Perez   presented 04/18/2022  for clinical monitoring of self administration of Spravato. The patient used a total of 3 devices, self-administered intranasally, with a 5-minute rest between each device.    Each device was checked by klb for appropriate expiration and that 2 green indicator dots were present prior to patient administration. Afterwards, the device was checked by klb to ensure the green indicator dots were gone and the full medication was delivered.    Patient check at 1:55pm with BP reading of 131/83  Patient check at 3:15pm with BP reading of 119/77    End time of observation: 3:15pm    Patient monitored for 2 hours Patient tolerated the procedure well without complications..       NDC [id number]  LOT [id number]  EXP 2023 Sep

## 2022-04-18 NOTE — PROGRESS NOTES
Subjective   Hyun Perez is a 42 y.o. female who presents today for Spravato tx #27    Chief Complaint:  Treatment Resistant Depression and Anxiety    History of Present Illness:  Prior to Spravato:  -Hx of tx resistent depression failing numerous medications over many years, and insomnia:.  -Significant FmHx of successful SA in mother, grandmother and extended family. Her mother passed away early 2021.   -FmHx of bipolar d/o w/o diagnosis herself. Admits to 9 possibly manic symptoms on MDQ.  -On 300mg Wellbutrin daily for depression with little to no efficacy.  -Struggles with SI daily without a plan or anticipating actions.  -Vraylar caused minimal improvements and worsened irritability, also caused oversedating in the day.  -Been reluctant to counseling due to being an atheist and fearing discrimination and judgement.  -Not been using Trazodone.  -Previously c/o tinnitis causing insomnia.    4/4/22: Restoril didn't work for insomnia unless she took 4 pills. Has cancer scare after recent mammogram showing calcifications, which has not yet been confirmed as cancer with repeat mammogram and needle biopsy scheduled for later this week. Pt reports anx/dep are worse because of this but is somewhat resistant to changing medications. The Spravato dose kicked in and caused insomnia during our encounter that prevented us from completing medication management discussion at this visit. C/o occasional SI w/o plans or anticipated actions, but denies AVH or HI.    4/11/22: Start 5mg Ambein which she has had in the past w/o concern for addiction or dependence, but at an uncertain dose. Pt would also like to increase Topamax  -Denies regression into depression or worsening of anxiety between treatments.  -Denies AVH or SI/HI.    4/18/22: Patient with continued complaints of depression and anxiety, still worried about her health concerns, still trouble sleeping well.  She denies any SI/HI.  Depression rated 6/10, anxiety 6/10,  "has not seen a significant decline since she is going to weekly Spravato treatments.  Increase of Topamax to 100 mg twice daily was helpful.    The following portions of the patient's history were reviewed and updated as appropriate: allergies, current medications, past family history, past medical history, past social history, past surgical history and problem list.    PAST PSYCHIATRIC HISTORY  Diagnosis treated:  Affective/Bipolar Disorder, Anxiety/Panic Disorder, ADHD dx in childhood.  Treatment Type:  Medication Management  Prior Psychiatric Medications:  Bupropion - takes edge off a little a bit.  prozac - inefficacy.  depakote - inefficacy.  seroquel - inefficacy.  zoloft - inefficacy.  paxil - inefficacy.  Duloxetine - inefficacy.  3mg Vraylar - oversedating and caused sugar cravings.  Trazodone - effective at 100mg nightly; makes her feel \"foggy\" and tired.  Lamictal - night sweats and sugar cravings.  Singulair - dep and SI  Spravato - effective at improving and maintaining depression remission.  Trazodone - ineffective for sleep.  Restoril, not helpful  Ambien  *Others the patient cannot recall the names of.  Support Groups:  None  Sequelae Of Mental Disorder:  job disruption, social isolation, family disruption, financial hardships, emotional distress.    Interval History  Improved    Side Effects  Denies from Spravato, Topamax, or Restoril.    Past psychiatric history was reviewed and compared to 4/11/2022 visit and appropriate updates were made.    Past Medical History:  Past Medical History:   Diagnosis Date   • Anxiety      Social History:  Social History     Socioeconomic History   • Marital status: Single   Tobacco Use   • Smoking status: Former Smoker   • Smokeless tobacco: Never Used   Vaping Use   • Vaping Use: Never used   Substance and Sexual Activity   • Alcohol use: Yes     Comment: occ wine   • Drug use: Not Currently   • Sexual activity: Defer     Family History:  Family History   Problem " Relation Age of Onset   • Hypertension Mother    • Coronary artery disease Mother         CABG x4 in late 30s   • Depression Mother         mother committed suicide   • Hypertension Maternal Grandmother    • Diabetes Maternal Grandmother    • Depression Maternal Grandmother         committed suicide   • Hypertension Maternal Grandfather    • Diabetes Maternal Grandfather    • Heart attack Maternal Grandfather    • Depression Maternal Cousin         committed suicide   • Depression Maternal Aunt         committed suicide     Past Surgical History:  Past Surgical History:   Procedure Laterality Date   • LEEP  1997   • TUBAL ABDOMINAL LIGATION Bilateral 2015     Problem List:  Patient Active Problem List   Diagnosis   • Seasonal allergies   • Reduced libido   • Class 2 severe obesity due to excess calories with serious comorbidity and body mass index (BMI) of 38.0 to 38.9 in adult ([facility])   • Recurrent major depressive disorder, in partial remission ([facility])   • Essential hypertension   • Hot flashes due to menopause   • Generalized anxiety disorder with panic attacks   • Varicose veins of lower extremity   • Mixed hyperlipidemia   • Chest pain   • Epigastric pain   • Diffuse arthralgia   • Other insomnia   • Bipolar II disorder ([facility])     Allergy:   Allergies   Allergen Reactions   • Banana Nausea Only   • Kiwi Extract Nausea Only   • Penicillin V Potassium Swelling   • Milk Thistle Other (See Comments)      Discontinued Medications:  There are no discontinued medications.   Current Medications:   Current Outpatient Medications   Medication Sig Dispense Refill   • allopurinol (ZYLOPRIM) 100 MG tablet TAKE 1 TABLET BY MOUTH TWICE DAILY 180 tablet 1   • atorvastatin (LIPITOR) 20 MG tablet Take 1 tablet by mouth Every Night. 90 tablet 1   • buPROPion XL (Wellbutrin XL) 300 MG 24 hr tablet Take 1 tablet by mouth Daily. 90 tablet 1   • celecoxib (CeleBREX) 200 MG capsule Take 1 capsule by mouth 2 (Two) Times a Day As Needed for  Moderate Pain . Take with food! 180 capsule 1   • clindamycin (CLINDAGEL) 1 % gel APPLY PEA SIZED AMOUNT TOPICALLY TO THE AFFECTED AREA EVERY MORNING     • Esketamine HCl, 84 MG Dose, (Spravato, 84 MG Dose,) Nasal Solution 6 sprays into the nostril(s) as directed by provider 1 (One) Time Per Week. 4 each 2   • fluticasone (FLONASE) 50 MCG/ACT nasal spray 1 spray into the nostril(s) as directed by provider Daily As Needed for Rhinitis or Allergies. DO NOT SNIFF! 48 g 1   • levocetirizine (Xyzal) 5 MG tablet Take 1 tablet by mouth Every Evening. 90 tablet 1   • lisinopril (PRINIVIL,ZESTRIL) 10 MG tablet TAKE 1 TABLET BY MOUTH DAILY 90 tablet 1   • omeprazole (priLOSEC) 40 MG capsule Take 40 mg by mouth Daily.     • topiramate (Topamax) 100 MG tablet Take 1 tablet by mouth 2 (Two) Times a Day. Take half (50mg) in the the morning and 100mg nightly for 1 week before 100mg twice daily. 60 tablet 0   • tretinoin (RETIN-A) 0.05 % cream APPLY PEA SIZED AMOUNT TOPICALLY TO THE AFFECTED AREA AT BEDTIME. START 2 NIGHTS EVERY WEEK. INCREASE AS TOLERATED     • triamcinolone (KENALOG) 0.1 % ointment Apply 0.1 application topically to the appropriate area as directed 2 (Two) Times a Day.     • zolpidem (Ambien) 5 MG tablet Take 1 tablet by mouth At Night As Needed for Sleep. 30 tablet 0     Current Facility-Administered Medications   Medication Dose Route Frequency Provider Last Rate Last Admin   • Esketamine HCl (84 MG Dose) Nasal Solution 84 mg  84 mg Nasal Once per day on Mon Thu Jaleel Byers PA-C   84 mg at 04/11/22 1305     Psychological ROS: positive for - some anx/dep w/ anhedonia, concentration difficulties, memory difficulties, some psychomotor depression, fatigue, sleep disturbances, appetite change, SI.  negative for - disorientation, hallucinations, hostility, mood swings, irritability.    Physical Exam:  BP Prior to tx: 101/71  BP 40 min into tx: 131/83  BP prior to discharge: 119/77    Start time of  observation: 1:13 PM  End time of observation: 3:15 PM    Mental Status Exam:   Orientation:  To person, Place, Time and Situation  Memory: Recent and remote memory Intact  Mood/Affect: Depressed and anxious  Suicidal Ideations: None  Homicidal Ideations:  None  Hallucinations: None  Delusions:  None  Obsessions: None  Behavior and Psychomotor Activity: Appropriate  Speech: Normal, rapid, pressured.  Thought Process: Goal directed and linear.  Associations: Intact  Thought Content: mood congruent   Language: name objects and repeat phrases  Concentration and computation: Fair  Attention Span: Fair  Fund of Knowledge: Fair  Reliability: fair  Insight into mental health: Fair  Judgement: Fair  Impulse Control:  Fair  Hygiene: Good  Cooperation:  Cooperative  Eye Contact: Fair  Physical/Medical Issues:  Yes HLD, obesity, HTN, GERD.     MSE reviewed and accepted with changes from the previous visit.    PHQ-9 Depression Screening:  Little interest or pleasure in doing things?     Feeling down, depressed, or hopeless?     Trouble falling or staying asleep, or sleeping too much?     Feeling tired or having little energy?     Poor appetite or overeating?     Feeling bad about yourself - or that you are a failure or have let yourself or your family down?     Trouble concentrating on things, such as reading the newspaper or watching television?     Moving or speaking so slowly that other people could have noticed? Or the opposite - being so fidgety or restless that you have been moving around a lot more than usual?     Thoughts that you would be better off dead, or of hurting yourself in some way?     PHQ-9 Total Score  12   If you checked off any problems, how difficult have these problems made it for you to do your work, take care of things at home, or get along with other people?  Very difficult        Former Smoker  I advised Hyun of the risks of tobacco use.     Assessment/Plan   Diagnoses and all orders for this  visit:    1. Generalized anxiety disorder with panic attacks (Primary)    2. Recurrent major depressive disorder, in partial remission (HCC)    3. Other insomnia         -Cont 84mg Spravato at once weekly rate to continue benefit on depression/anxiety, will soon consider tapering to every other week.    -Continue Ambien 5 mg at bedtime as needed for sleep, may need to increase to 10 mg  -Continue Topamax 100 mg BID for mood stabilization.  -Cont. 300mg Wellbutin daily for depression.  -Counseling continues to be encouraged.  -F/U in 1w for next Spravato tx. See if pt got back in touch with atheist friendly therapist, monitor improvement with Spravato and topamax.    -In the future, continue to monitor sodium and wt.    Visit Diagnoses:    ICD-10-CM ICD-9-CM   1. Generalized anxiety disorder with panic attacks  F41.1 300.02    F41.0 300.01   2. Recurrent major depressive disorder, in partial remission (HCC)  F33.41 296.35   3. Other insomnia  G47.09 780.52        TREATMENT PLAN/GOALS: In the short-term, the patient is to continue supportive psychotherapy efforts and medications as indicated. Treatment and medication options were discussed during today's visit. Long-term the goal will be to control symptoms so they do not negatively interfere with other aspects of the patient's life. Prognosis is optimistic with implementation of the current treatment plan. Patient ackowledged and verbally consented to the treatment plan and was educated on the importance of compliance with treatment and follow-up appointments.    MEDICATION ISSUES:  INSPECT reviewed as expected. On 84mg dose pack of Spravato.     MEDS ORDERED DURING VISIT:  No orders of the defined types were placed in this encounter.    Return in about 1 week (around 4/25/2022).     Mckenzie VACA PA-C, was present in the office suite and immediately available to furnish assistance and direction throughout the entire observation time.     Patient tolerated the  procedure well without complications..     Side effects of treatment were mild and included sedation, dizziness, mild euphoria, and mild BP elevation.    After the observation time, the patient was assessed by me and considered stable to leave the office with assistance. Hyun Perez  was advised not to drive or operate machinery until tomorrow following a full night's sleep.    Mckenzie Guzman PA-C  04/18/22   12:26 EST      This document has been electronically signed by Mckenzie Guzman PA-C  April 18, 2022 13:40 EDT    EMR Dragon transcription disclaimer:  Part of this note may be an electronic transcription/translation of spoken language to printed text using the Dragon Dictation System.

## 2022-04-21 DIAGNOSIS — G47.09 OTHER INSOMNIA: Chronic | ICD-10-CM

## 2022-04-21 RX ORDER — TEMAZEPAM 7.5 MG/1
7.5 CAPSULE ORAL NIGHTLY PRN
Qty: 30 CAPSULE | OUTPATIENT
Start: 2022-04-21

## 2022-04-26 DIAGNOSIS — F33.2 SEVERE EPISODE OF RECURRENT MAJOR DEPRESSIVE DISORDER, WITHOUT PSYCHOTIC FEATURES: Chronic | ICD-10-CM

## 2022-04-27 RX ORDER — ESKETAMINE HYDROCHLORIDE 28 MG/.2ML
SOLUTION NASAL
Qty: 6 EACH | Refills: 0 | OUTPATIENT
Start: 2022-04-27

## 2022-04-28 DIAGNOSIS — F41.1 GENERALIZED ANXIETY DISORDER WITH PANIC ATTACKS: Chronic | ICD-10-CM

## 2022-04-28 DIAGNOSIS — F41.0 GENERALIZED ANXIETY DISORDER WITH PANIC ATTACKS: Chronic | ICD-10-CM

## 2022-04-28 DIAGNOSIS — F33.41 RECURRENT MAJOR DEPRESSIVE DISORDER, IN PARTIAL REMISSION: Chronic | ICD-10-CM

## 2022-04-28 RX ORDER — TOPIRAMATE 100 MG/1
100 TABLET, FILM COATED ORAL 2 TIMES DAILY
Qty: 60 TABLET | Refills: 2 | Status: SHIPPED | OUTPATIENT
Start: 2022-04-28 | End: 2022-08-19 | Stop reason: SDUPTHER

## 2022-05-09 ENCOUNTER — OFFICE VISIT (OUTPATIENT)
Dept: PSYCHIATRY | Facility: CLINIC | Age: 42
End: 2022-05-09

## 2022-05-09 DIAGNOSIS — F99 INSOMNIA DUE TO OTHER MENTAL DISORDER: Chronic | ICD-10-CM

## 2022-05-09 DIAGNOSIS — F51.05 INSOMNIA DUE TO OTHER MENTAL DISORDER: Chronic | ICD-10-CM

## 2022-05-09 DIAGNOSIS — F41.0 GENERALIZED ANXIETY DISORDER WITH PANIC ATTACKS: Chronic | ICD-10-CM

## 2022-05-09 DIAGNOSIS — F33.41 RECURRENT MAJOR DEPRESSIVE DISORDER, IN PARTIAL REMISSION: Primary | Chronic | ICD-10-CM

## 2022-05-09 DIAGNOSIS — F41.1 GENERALIZED ANXIETY DISORDER WITH PANIC ATTACKS: Chronic | ICD-10-CM

## 2022-05-09 DIAGNOSIS — G47.09 OTHER INSOMNIA: ICD-10-CM

## 2022-05-09 PROCEDURE — 99214 OFFICE O/P EST MOD 30 MIN: CPT

## 2022-05-09 PROCEDURE — 99416 PROLNG CLIN STAFF SVC EA ADD: CPT

## 2022-05-09 PROCEDURE — 99415 PROLNG CLIN STAFF SVC 1ST HR: CPT

## 2022-05-09 NOTE — PROGRESS NOTES
Subjective   Hyun Perez is a 42 y.o. female who presents today for Spravato tx #28    Chief Complaint: No current anx/dep concerns.    History of Present Illness:  Prior to Spravato:  -Hx of tx resistent depression failing numerous medications over many years, and insomnia:.  -Significant FmHx of successful SA in mother, grandmother and extended family. Her mother passed away early 2021.   -FmHx of bipolar d/o w/o diagnosis herself. Admits to 9 possibly manic symptoms on MDQ.  -On 300mg Wellbutrin daily for depression with little to no efficacy.  -Struggles with SI daily without a plan or anticipating actions.  -Vraylar caused minimal improvements and worsened irritability, also caused oversedating in the day.  -Been reluctant to counseling due to being an atheist and fearing discrimination and judgement.  -Not been using Trazodone.  -Previously c/o tinnitis causing insomnia.    4/4/22: Restoril didn't work for insomnia unless she took 4 pills. Has cancer scare after recent mammogram showing calcifications, which has not yet been confirmed as cancer with repeat mammogram and needle biopsy scheduled for later this week. Pt reports anx/dep are worse because of this but is somewhat resistant to changing medications. The Spravato dose kicked in and caused insomnia during our encounter that prevented us from completing medication management discussion at this visit. C/o occasional SI w/o plans or anticipated actions, but denies AVH or HI.    4/11/22: Start 5mg Ambein which she has had in the past w/o concern for addiction or dependence, but at an uncertain dose. Pt would also like to increase Topamax  -Denies regression into depression or worsening of anxiety between treatments.  -Denies AVH or SI/HI.    4/18/22: Patient with continued complaints of depression and anxiety, still worried about her health concerns, still trouble sleeping well.  She denies any SI/HI.  Depression rated 6/10, anxiety 6/10, has not seen a  "significant decline since she is going to weekly Spravato treatments.  Increase of Topamax to 100 mg twice daily was helpful.    5/9/22: Ambien is effective for insomnia at 5mg currently. She continues to believe antihistamines and steroids were the cause of her significant depression for the past 2 years. She c/o ringing in her ears she has had since COVID, but no current dep/anx concerns. She continues to tolerate Spravato well, and would like to do one more treatment next week and attempt stopping it since she has missed some treatments lately and not had recurrence of worsening depression. Denies AVH or SI/HI.    The following portions of the patient's history were reviewed and updated as appropriate: allergies, current medications, past family history, past medical history, past social history, past surgical history and problem list.    PAST PSYCHIATRIC HISTORY  Diagnosis treated:  Affective/Bipolar Disorder, Anxiety/Panic Disorder, ADHD dx in childhood.  Treatment Type:  Medication Management  Prior Psychiatric Medications:  Bupropion - takes edge off a little a bit.  prozac - inefficacy.  depakote - inefficacy.  seroquel - inefficacy.  zoloft - inefficacy.  paxil - inefficacy.  Duloxetine - inefficacy.  3mg Vraylar - oversedating and caused sugar cravings.  Trazodone - effective at 100mg nightly; makes her feel \"foggy\" and tired.  Lamictal - night sweats and sugar cravings.  Singulair - dep and SI  Spravato - effective at improving and maintaining depression remission.  Trazodone - ineffective for sleep.  Restoril - not helpful  Ambien - effective for insomnia at 5mg nightly.  *Others the patient cannot recall the names of.  Support Groups:  None  Sequelae Of Mental Disorder:  job disruption, social isolation, family disruption, financial hardships, emotional distress.    Interval History  Improved insomnia with 5mg Ambien.    Side Effects  Denies from Spravato, Topamax, or Ambein.    -Past psychiatric " history was reviewed and compared to 4/18/22 with appropriate updates made.    Past Medical History:  Past Medical History:   Diagnosis Date   • Anxiety      Social History:  Social History     Socioeconomic History   • Marital status: Single   Tobacco Use   • Smoking status: Former Smoker   • Smokeless tobacco: Never Used   Vaping Use   • Vaping Use: Never used   Substance and Sexual Activity   • Alcohol use: Yes     Comment: occ wine   • Drug use: Not Currently   • Sexual activity: Defer     Family History:  Family History   Problem Relation Age of Onset   • Hypertension Mother    • Coronary artery disease Mother         CABG x4 in late 30s   • Depression Mother         mother committed suicide   • Hypertension Maternal Grandmother    • Diabetes Maternal Grandmother    • Depression Maternal Grandmother         committed suicide   • Hypertension Maternal Grandfather    • Diabetes Maternal Grandfather    • Heart attack Maternal Grandfather    • Depression Maternal Cousin         committed suicide   • Depression Maternal Aunt         committed suicide     Past Surgical History:  Past Surgical History:   Procedure Laterality Date   • LEEP  1997   • TUBAL ABDOMINAL LIGATION Bilateral 2015     Problem List:  Patient Active Problem List   Diagnosis   • Seasonal allergies   • Reduced libido   • Class 2 severe obesity due to excess calories with serious comorbidity and body mass index (BMI) of 38.0 to 38.9 in adult ([facility])   • Recurrent major depressive disorder, in partial remission ([facility])   • Essential hypertension   • Hot flashes due to menopause   • Generalized anxiety disorder with panic attacks   • Varicose veins of lower extremity   • Mixed hyperlipidemia   • Chest pain   • Epigastric pain   • Diffuse arthralgia   • Insomnia due to other mental disorder   • Bipolar II disorder ([facility])     Allergy:   Allergies   Allergen Reactions   • Banana Nausea Only   • Kiwi Extract Nausea Only   • Penicillin V Potassium Swelling   •  Milk Thistle Other (See Comments)      Discontinued Medications:  There are no discontinued medications.   Current Medications:   Current Outpatient Medications   Medication Sig Dispense Refill   • allopurinol (ZYLOPRIM) 100 MG tablet TAKE 1 TABLET BY MOUTH TWICE DAILY 180 tablet 1   • atorvastatin (LIPITOR) 20 MG tablet Take 1 tablet by mouth Every Night. 90 tablet 1   • buPROPion XL (Wellbutrin XL) 300 MG 24 hr tablet Take 1 tablet by mouth Daily. 90 tablet 1   • celecoxib (CeleBREX) 200 MG capsule Take 1 capsule by mouth 2 (Two) Times a Day As Needed for Moderate Pain . Take with food! 180 capsule 1   • clindamycin (CLINDAGEL) 1 % gel APPLY PEA SIZED AMOUNT TOPICALLY TO THE AFFECTED AREA EVERY MORNING     • Esketamine HCl, 84 MG Dose, (Spravato, 84 MG Dose,) Nasal Solution 6 sprays into the nostril(s) as directed by provider 1 (One) Time Per Week. 4 each 2   • fluticasone (FLONASE) 50 MCG/ACT nasal spray 1 spray into the nostril(s) as directed by provider Daily As Needed for Rhinitis or Allergies. DO NOT SNIFF! 48 g 1   • levocetirizine (Xyzal) 5 MG tablet Take 1 tablet by mouth Every Evening. 90 tablet 1   • lisinopril (PRINIVIL,ZESTRIL) 10 MG tablet TAKE 1 TABLET BY MOUTH DAILY 90 tablet 1   • omeprazole (priLOSEC) 40 MG capsule Take 40 mg by mouth Daily.     • topiramate (TOPAMAX) 100 MG tablet Take 1 tablet by mouth 2 (Two) Times a Day. 60 tablet 2   • tretinoin (RETIN-A) 0.05 % cream APPLY PEA SIZED AMOUNT TOPICALLY TO THE AFFECTED AREA AT BEDTIME. START 2 NIGHTS EVERY WEEK. INCREASE AS TOLERATED     • triamcinolone (KENALOG) 0.1 % ointment Apply 0.1 application topically to the appropriate area as directed 2 (Two) Times a Day.     • zolpidem (AMBIEN) 5 MG tablet TAKE 1 TABLET BY MOUTH AT NIGHT AS NEEDED FOR SLEEP 30 tablet 2     Current Facility-Administered Medications   Medication Dose Route Frequency Provider Last Rate Last Admin   • Esketamine HCl (84 MG Dose) Nasal Solution 84 mg  84 mg Nasal Once per  day on Mon Thu Jaleel Byers PA-C   84 mg at 05/09/22 1302     Psychological ROS: positive for - mild fatigue, guilt, psychomotor depression, appetite change, and concentration difficulty.  negative for - anx/dep, anhedonia, sleep disturbances, disorientation, hallucinations, hostility, mood swings, irritability, SI.    Physical Exam:  BP Prior to tx: 124/76  BP 40 min into tx: 133/77  BP prior to discharge: 134/81    Start time of observation: 1:02 PM  End time of observation: 3:05 PM    Mental Status Exam:   Orientation:  To person, Place, Time and Situation  Memory: Recent and remote memory Intact  Mood/Affect: mild dep/anx  Suicidal Ideations: None  Homicidal Ideations:  None  Hallucinations: None  Delusions:  None  Obsessions: None  Behavior and Psychomotor Activity: Appropriate  Speech: Normal  Thought Process: Goal directed and linear.  Associations: Intact  Thought Content: mood congruent   Language: name objects and repeat phrases  Concentration and computation: Fair  Attention Span: Fair  Fund of Knowledge: Fair  Reliability: fair  Insight into mental health: Fair  Judgement: Fair  Impulse Control:  Fair  Hygiene: Good  Cooperation:  Cooperative  Eye Contact: Fair  Physical/Medical Issues:  Yes HLD, obesity, HTN, GERD.     MSE reviewed and accepted with changes from the previous visit.    PHQ-9 Depression Screening:  Little interest or pleasure in doing things?  0   Feeling down, depressed, or hopeless?  0   Trouble falling or staying asleep, or sleeping too much?     Feeling tired or having little energy?     Poor appetite or overeating?     Feeling bad about yourself - or that you are a failure or have let yourself or your family down?     Trouble concentrating on things, such as reading the newspaper or watching television?     Moving or speaking so slowly that other people could have noticed? Or the opposite - being so fidgety or restless that you have been moving around a lot more than  usual?     Thoughts that you would be better off dead, or of hurting yourself in some way?  0   PHQ-9 Total Score  5   If you checked off any problems, how difficult have these problems made it for you to do your work, take care of things at home, or get along with other people?  Very difficult   Feeling nervous, anxious or on edge: Several days  Not being able to stop or control worrying: Not at all  Worrying too much about different things: Several days  Trouble Relaxing: Several days  Being so restless that it is hard to sit still: Several days  Feeling afraid as if something awful might happen: Not at all  Becoming easily annoyed or irritable: Not at all  ISIDRO 7 Total Score: 4  If you checked any problems, how difficult have these problems made it for you to do your work, take care of things at home, or get along with other people: Not difficult at all  PHQ-2: 0; unlikely depression.  ISIDRO-7: 4; unlikely anxiety.    Former Smoker  I advised Hyun of the risks of tobacco use.     Assessment/Plan   Diagnoses and all orders for this visit:    1. Recurrent major depressive disorder, in partial remission (HCC) (Primary)    2. Generalized anxiety disorder with panic attacks    3. Insomnia due to other mental disorder    PHQ-2: 0; unlikely depression.  ISIDRO-7: 4; unlikely anxiety.    -Cont 84mg Spravato for one more treatment next week before attempting to stop the medication at pt request.    -Cont Ambien 5mg qhs PRN for known insomnia benefit. Ambien refill request prior to encounter and sent with refills.  -Cont Topamax 100mg BID for mood stabilization.  -Cont. 300mg Wellbutin daily for depression.  -Counseling continues to be encouraged to maintain stability.  -F/U in 1w for one more Spravato tx. In the future, continue to monitor sodium and wt.    Visit Diagnoses:    ICD-10-CM ICD-9-CM   1. Recurrent major depressive disorder, in partial remission (HCC)  F33.41 296.35   2. Generalized anxiety disorder with panic  attacks  F41.1 300.02    F41.0 300.01   3. Insomnia due to other mental disorder  F51.05 300.9    F99 327.02      TREATMENT PLAN/GOALS: In the short-term, the patient is to continue supportive psychotherapy efforts and medications as indicated. Treatment and medication options were discussed during today's visit. Long-term the goal will be to control symptoms so they do not negatively interfere with other aspects of the patient's life. Prognosis is optimistic with implementation of the current treatment plan. Patient ackowledged and verbally consented to the treatment plan and was educated on the importance of compliance with treatment and follow-up appointments.    MEDICATION ISSUES:  INSPECT reviewed as expected. On 84mg dose pack of Spravato.     MEDS ORDERED DURING VISIT:  No orders of the defined types were placed in this encounter.    Return in about 1 week (around 5/16/2022) for Recheck.     I, Jaleel Byers PA-C, was present in the office suite and immediately available to furnish assistance and direction throughout the entire observation time.     Patient tolerated the procedure well without complications..     Side effects of treatment were mild and included sedation, dizziness, mild euphoria, and mild BP elevation.    After the observation time, the patient was assessed by me and considered stable to leave the office with assistance. Hyun Perez  was advised not to drive or operate machinery until tomorrow following a full night's sleep.    Jaleel Byers PA-C  05/10/22   12:26 EST      This document has been electronically signed by Jaleel Byers PA-C  May 10, 2022 09:05 EDT    EMR Dragon transcription disclaimer:  Part of this note may be an electronic transcription/translation of spoken language to printed text using the Dragon Dictation System.

## 2022-05-09 NOTE — PROGRESS NOTES
Spravato Monitoring Note    Start time of observation: 1:02pm  BP check prior to administration: 124/76    Hyun Fatouariana   presented 05/09/2022  for clinical monitoring of self administration of Spravato. The patient used a total of 3 devices, self-administered intranasally, with a 5-minute rest between each device.    Each device was checked by klb for appropriate expiration and that 2 green indicator dots were present prior to patient administration. Afterwards, the device was checked by klb to ensure the green indicator dots were gone and the full medication was delivered.    Patient check at 1:45pm with BP reading of 133/77  Patient check at 3:05pm with BP reading of 134/81    End time of observation: 3:05pm    Patient monitored for 2 hours Patient tolerated the procedure well without complications..       NDC [id number]  LOT [id number]  EXP 2023 Sep

## 2022-05-10 RX ORDER — ZOLPIDEM TARTRATE 5 MG/1
5 TABLET ORAL NIGHTLY PRN
Qty: 30 TABLET | Refills: 2 | Status: SHIPPED | OUTPATIENT
Start: 2022-05-10 | End: 2022-06-01 | Stop reason: DRUGHIGH

## 2022-05-17 DIAGNOSIS — J30.2 SEASONAL ALLERGIES: ICD-10-CM

## 2022-05-18 RX ORDER — FLUTICASONE PROPIONATE 50 MCG
1 SPRAY, SUSPENSION (ML) NASAL DAILY PRN
Qty: 48 G | Refills: 1 | Status: SHIPPED | OUTPATIENT
Start: 2022-05-18 | End: 2023-01-11

## 2022-05-22 DIAGNOSIS — F31.81 BIPOLAR II DISORDER: Chronic | ICD-10-CM

## 2022-05-22 DIAGNOSIS — E78.2 MIXED HYPERLIPIDEMIA: ICD-10-CM

## 2022-05-22 DIAGNOSIS — F41.0 GENERALIZED ANXIETY DISORDER WITH PANIC ATTACKS: Chronic | ICD-10-CM

## 2022-05-22 DIAGNOSIS — F41.1 GENERALIZED ANXIETY DISORDER WITH PANIC ATTACKS: Chronic | ICD-10-CM

## 2022-05-22 DIAGNOSIS — F33.2 SEVERE EPISODE OF RECURRENT MAJOR DEPRESSIVE DISORDER, WITHOUT PSYCHOTIC FEATURES: Chronic | ICD-10-CM

## 2022-05-22 RX ORDER — TOPIRAMATE 50 MG/1
TABLET, FILM COATED ORAL
Qty: 60 TABLET | Refills: 2 | OUTPATIENT
Start: 2022-05-22

## 2022-05-23 ENCOUNTER — OFFICE VISIT (OUTPATIENT)
Dept: PSYCHIATRY | Facility: CLINIC | Age: 42
End: 2022-05-23

## 2022-05-23 DIAGNOSIS — F41.1 GENERALIZED ANXIETY DISORDER WITH PANIC ATTACKS: Chronic | ICD-10-CM

## 2022-05-23 DIAGNOSIS — F41.0 GENERALIZED ANXIETY DISORDER WITH PANIC ATTACKS: Chronic | ICD-10-CM

## 2022-05-23 DIAGNOSIS — F51.05 INSOMNIA DUE TO OTHER MENTAL DISORDER: Primary | Chronic | ICD-10-CM

## 2022-05-23 DIAGNOSIS — F33.2 SEVERE EPISODE OF RECURRENT MAJOR DEPRESSIVE DISORDER, WITHOUT PSYCHOTIC FEATURES: Chronic | ICD-10-CM

## 2022-05-23 DIAGNOSIS — E78.2 MIXED HYPERLIPIDEMIA: ICD-10-CM

## 2022-05-23 DIAGNOSIS — F33.42 RECURRENT MAJOR DEPRESSIVE DISORDER, IN FULL REMISSION: Chronic | ICD-10-CM

## 2022-05-23 DIAGNOSIS — F31.81 BIPOLAR II DISORDER: Chronic | ICD-10-CM

## 2022-05-23 DIAGNOSIS — F99 INSOMNIA DUE TO OTHER MENTAL DISORDER: Primary | Chronic | ICD-10-CM

## 2022-05-23 PROCEDURE — 99415 PROLNG CLIN STAFF SVC 1ST HR: CPT

## 2022-05-23 PROCEDURE — 99416 PROLNG CLIN STAFF SVC EA ADD: CPT

## 2022-05-23 PROCEDURE — 99214 OFFICE O/P EST MOD 30 MIN: CPT

## 2022-05-23 RX ORDER — TOPIRAMATE 50 MG/1
TABLET, FILM COATED ORAL
Qty: 60 TABLET | Refills: 2 | OUTPATIENT
Start: 2022-05-23

## 2022-05-23 RX ORDER — ATORVASTATIN CALCIUM 20 MG/1
20 TABLET, FILM COATED ORAL NIGHTLY
Qty: 90 TABLET | Refills: 1 | OUTPATIENT
Start: 2022-05-23

## 2022-05-23 RX ORDER — ATORVASTATIN CALCIUM 20 MG/1
20 TABLET, FILM COATED ORAL NIGHTLY
Qty: 90 TABLET | Refills: 1 | Status: SHIPPED | OUTPATIENT
Start: 2022-05-23

## 2022-05-26 NOTE — PROGRESS NOTES
I have reviewed the notes, assessments, and/or procedures performed by Jaleel Byers, I concur with her/his documentation of Hyun Perez.

## 2022-06-01 ENCOUNTER — TELEPHONE (OUTPATIENT)
Dept: PSYCHIATRY | Facility: CLINIC | Age: 42
End: 2022-06-01

## 2022-06-01 DIAGNOSIS — F99 INSOMNIA DUE TO OTHER MENTAL DISORDER: Primary | Chronic | ICD-10-CM

## 2022-06-01 DIAGNOSIS — F51.05 INSOMNIA DUE TO OTHER MENTAL DISORDER: Primary | Chronic | ICD-10-CM

## 2022-06-01 RX ORDER — ZOLPIDEM TARTRATE 10 MG/1
10 TABLET ORAL NIGHTLY PRN
Qty: 30 TABLET | Refills: 2 | Status: SHIPPED | OUTPATIENT
Start: 2022-06-01 | End: 2022-08-29

## 2022-06-01 NOTE — TELEPHONE ENCOUNTER
Patient called stating that she is out of the Ambien since you told her she could go to 10 mg nightly.  Asking for fill of the 10mg Ambien

## 2022-06-05 PROBLEM — F33.42 RECURRENT MAJOR DEPRESSIVE DISORDER, IN FULL REMISSION: Chronic | Status: ACTIVE | Noted: 2017-03-01

## 2022-06-10 DIAGNOSIS — F33.1 MAJOR DEPRESSIVE DISORDER, RECURRENT, MODERATE: ICD-10-CM

## 2022-06-10 RX ORDER — BUPROPION HYDROCHLORIDE 300 MG/1
300 TABLET ORAL DAILY
Qty: 90 TABLET | Refills: 1 | Status: SHIPPED | OUTPATIENT
Start: 2022-06-10

## 2022-06-23 ENCOUNTER — TELEPHONE (OUTPATIENT)
Dept: FAMILY MEDICINE CLINIC | Facility: CLINIC | Age: 42
End: 2022-06-23

## 2022-06-23 RX ORDER — ONDANSETRON 4 MG/1
4 TABLET, FILM COATED ORAL EVERY 8 HOURS PRN
Qty: 30 TABLET | Refills: 0 | Status: SHIPPED | OUTPATIENT
Start: 2022-06-23 | End: 2023-01-11

## 2022-06-23 NOTE — TELEPHONE ENCOUNTER
Pt called and states she is having nausea. She has an appt. 06/29/22 with you.   Pt is requesting some zofran be sent in to pharmacy.

## 2022-06-29 ENCOUNTER — LAB (OUTPATIENT)
Dept: FAMILY MEDICINE CLINIC | Facility: CLINIC | Age: 42
End: 2022-06-29

## 2022-06-29 ENCOUNTER — OFFICE VISIT (OUTPATIENT)
Dept: FAMILY MEDICINE CLINIC | Facility: CLINIC | Age: 42
End: 2022-06-29

## 2022-06-29 VITALS
WEIGHT: 221 LBS | HEART RATE: 56 BPM | TEMPERATURE: 98.6 F | DIASTOLIC BLOOD PRESSURE: 85 MMHG | HEIGHT: 67 IN | SYSTOLIC BLOOD PRESSURE: 129 MMHG | OXYGEN SATURATION: 99 % | BODY MASS INDEX: 34.69 KG/M2 | RESPIRATION RATE: 16 BRPM

## 2022-06-29 DIAGNOSIS — R11.0 NAUSEA: ICD-10-CM

## 2022-06-29 DIAGNOSIS — N92.3 SPOTTING BETWEEN MENSES: Primary | ICD-10-CM

## 2022-06-29 DIAGNOSIS — E78.2 MIXED HYPERLIPIDEMIA: ICD-10-CM

## 2022-06-29 LAB
ALBUMIN SERPL-MCNC: 3.9 G/DL (ref 3.5–5.2)
ALBUMIN/GLOB SERPL: 1.4 G/DL
ALP SERPL-CCNC: 68 U/L (ref 39–117)
ALT SERPL W P-5'-P-CCNC: 18 U/L (ref 1–33)
ANION GAP SERPL CALCULATED.3IONS-SCNC: 7.7 MMOL/L (ref 5–15)
AST SERPL-CCNC: 9 U/L (ref 1–32)
BASOPHILS # BLD AUTO: 0.06 10*3/MM3 (ref 0–0.2)
BASOPHILS NFR BLD AUTO: 0.6 % (ref 0–1.5)
BILIRUB SERPL-MCNC: 0.2 MG/DL (ref 0–1.2)
BUN SERPL-MCNC: 13 MG/DL (ref 6–20)
BUN/CREAT SERPL: 19.1 (ref 7–25)
CALCIUM SPEC-SCNC: 9.6 MG/DL (ref 8.6–10.5)
CHLORIDE SERPL-SCNC: 105 MMOL/L (ref 98–107)
CHOLEST SERPL-MCNC: 156 MG/DL (ref 0–200)
CO2 SERPL-SCNC: 23.3 MMOL/L (ref 22–29)
CREAT SERPL-MCNC: 0.68 MG/DL (ref 0.57–1)
DEPRECATED RDW RBC AUTO: 43.9 FL (ref 37–54)
EGFRCR SERPLBLD CKD-EPI 2021: 112.4 ML/MIN/1.73
EOSINOPHIL # BLD AUTO: 0.21 10*3/MM3 (ref 0–0.4)
EOSINOPHIL NFR BLD AUTO: 2.1 % (ref 0.3–6.2)
ERYTHROCYTE [DISTWIDTH] IN BLOOD BY AUTOMATED COUNT: 13.3 % (ref 12.3–15.4)
FERRITIN SERPL-MCNC: 20.9 NG/ML (ref 13–150)
GLOBULIN UR ELPH-MCNC: 2.8 GM/DL
GLUCOSE SERPL-MCNC: 90 MG/DL (ref 65–99)
HCT VFR BLD AUTO: 37.2 % (ref 34–46.6)
HDLC SERPL-MCNC: 40 MG/DL (ref 40–60)
HGB BLD-MCNC: 12.3 G/DL (ref 12–15.9)
IMM GRANULOCYTES # BLD AUTO: 0.03 10*3/MM3 (ref 0–0.05)
IMM GRANULOCYTES NFR BLD AUTO: 0.3 % (ref 0–0.5)
LDLC SERPL CALC-MCNC: 89 MG/DL (ref 0–100)
LDLC/HDLC SERPL: 2.14 {RATIO}
LYMPHOCYTES # BLD AUTO: 2.13 10*3/MM3 (ref 0.7–3.1)
LYMPHOCYTES NFR BLD AUTO: 21.1 % (ref 19.6–45.3)
MCH RBC QN AUTO: 30.4 PG (ref 26.6–33)
MCHC RBC AUTO-ENTMCNC: 33.1 G/DL (ref 31.5–35.7)
MCV RBC AUTO: 91.9 FL (ref 79–97)
MONOCYTES # BLD AUTO: 0.54 10*3/MM3 (ref 0.1–0.9)
MONOCYTES NFR BLD AUTO: 5.3 % (ref 5–12)
NEUTROPHILS NFR BLD AUTO: 7.13 10*3/MM3 (ref 1.7–7)
NEUTROPHILS NFR BLD AUTO: 70.6 % (ref 42.7–76)
NRBC BLD AUTO-RTO: 0 /100 WBC (ref 0–0.2)
PLATELET # BLD AUTO: 346 10*3/MM3 (ref 140–450)
PMV BLD AUTO: 10.1 FL (ref 6–12)
POTASSIUM SERPL-SCNC: 4.1 MMOL/L (ref 3.5–5.2)
PROT SERPL-MCNC: 6.7 G/DL (ref 6–8.5)
RBC # BLD AUTO: 4.05 10*6/MM3 (ref 3.77–5.28)
SODIUM SERPL-SCNC: 136 MMOL/L (ref 136–145)
TRIGL SERPL-MCNC: 152 MG/DL (ref 0–150)
VLDLC SERPL-MCNC: 27 MG/DL (ref 5–40)
WBC NRBC COR # BLD: 10.1 10*3/MM3 (ref 3.4–10.8)

## 2022-06-29 PROCEDURE — 80053 COMPREHEN METABOLIC PANEL: CPT | Performed by: FAMILY MEDICINE

## 2022-06-29 PROCEDURE — 80061 LIPID PANEL: CPT | Performed by: FAMILY MEDICINE

## 2022-06-29 PROCEDURE — 85025 COMPLETE CBC W/AUTO DIFF WBC: CPT | Performed by: FAMILY MEDICINE

## 2022-06-29 PROCEDURE — 36415 COLL VENOUS BLD VENIPUNCTURE: CPT | Performed by: FAMILY MEDICINE

## 2022-06-29 PROCEDURE — 82728 ASSAY OF FERRITIN: CPT | Performed by: FAMILY MEDICINE

## 2022-06-29 PROCEDURE — 99214 OFFICE O/P EST MOD 30 MIN: CPT | Performed by: FAMILY MEDICINE

## 2022-06-29 NOTE — PROGRESS NOTES
"Assessment and Plan     Problem List Items Addressed This Visit        Cardiac and Vasculature    Mixed hyperlipidemia    Overview     Reviewed lipid panel & LDL goal.  Encouraged a diet rich in vegetables & 150 minutes of exercise weekly.  Continue atorvastatin nightly.           Relevant Orders    CBC w AUTO Differential (Completed)    Comprehensive metabolic panel (Completed)    Lipid Panel (Completed)      Other Visit Diagnoses     Spotting between menses    -  Primary    Will evaluate for anemia.  Will refer to OB/GYN for further evaluation and recommendation.    Relevant Orders    Ambulatory Referral to Obstetrics / Gynecology (Completed)    Ferritin (Completed)    Nausea        Will obtain labs.  Continue PPI.  GERD appropriate diet in AVS.  Will refer to OB/GYN for further evaluation and recommendation.    Relevant Orders    CBC w AUTO Differential (Completed)    Comprehensive metabolic panel (Completed)        Return if symptoms worsen or fail to improve, for keep appointment in September.        Patient was given instructions and counseling regarding her condition or for health maintenance advice. Please see specific information pulled into the AVS if appropriate.        Hyun is a 42 y.o. being seen today for  Nausea   Subjective   History of the Present Illness     Obese white female with a concurrent medical history of drug resistant depression presents with complaints of persistent nausea for the last 2 weeks. Associated symptoms include fatigue, diminished appetite, temperature intolerance (cold), and irregular menses (spotting). Complains of lower abdominal pain associated with menses. Reports negative pregnancy approximately 2 weeks ago. Denies any vomiting. Eliminated EtOH and meat from diet. Drinking water though \"not enough\" and one espresso with cow milk daily. Denies eating out. Typical diet consist baked potatoes, broccoli/cauliflower, tomatoes sandwiches a few times a week, mandarin oranges, " "Cheerios, and eggs. Denies any changes in bowel habits. 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 Of note, patient is prescribed Celebrex PRN but reports taking it regularly. Taking omeprazole 40 mg daily.  Reports that current symptoms are unlike dyspeptic symptoms of the past.    Requesting referral to OB-GYN.     Normotensive in office. Started statin for HLD. Reports tolerating it well.    Social History  She  reports that she has quit smoking. She has never used smokeless tobacco. She reports current alcohol use. She reports previous drug use.  Objective   Vital Signs          Vitals:    06/29/22 1503   BP: 129/85   BP Location: Left arm   Patient Position: Sitting   Cuff Size: Large Adult   Pulse: 56   Resp: 16   Temp: 98.6 °F (37 °C)   TempSrc: Temporal   SpO2: 99%   Weight: 100 kg (221 lb)   Height: 170.2 cm (67\")     BP Readings from Last 1 Encounters:   06/29/22 129/85     Wt Readings from Last 3 Encounters:   06/29/22 100 kg (221 lb)   03/04/22 110 kg (243 lb)   11/08/21 113 kg (250 lb)   Body mass index is 34.61 kg/m².     Physical Exam  Vitals reviewed.   Constitutional:       General: She is not in acute distress.     Appearance: Normal appearance. She is obese.   HENT:      Head: Normocephalic and atraumatic.   Cardiovascular:      Rate and Rhythm: Normal rate.      Heart sounds: Normal heart sounds.   Pulmonary:      Effort: Pulmonary effort is normal.      Breath sounds: Normal breath sounds.   Abdominal:      General: Bowel sounds are normal.      Palpations: Abdomen is soft.      Tenderness: There is no abdominal tenderness.   Neurological:      Mental Status: She is alert and oriented to person, place, and time.   Psychiatric:         Mood and Affect: Mood normal.         Behavior: Behavior normal.               Lipid Panel (03/04/2022 13:47)  TSH (09/22/2021 15:28)  Comprehensive Metabolic Panel (09/22/2021 15:28)  CBC Auto Differential " (09/22/2021 15:28)

## 2022-08-19 ENCOUNTER — OFFICE VISIT (OUTPATIENT)
Dept: PSYCHIATRY | Facility: CLINIC | Age: 42
End: 2022-08-19

## 2022-08-19 VITALS
WEIGHT: 217.4 LBS | BODY MASS INDEX: 34.05 KG/M2 | DIASTOLIC BLOOD PRESSURE: 86 MMHG | HEART RATE: 80 BPM | SYSTOLIC BLOOD PRESSURE: 132 MMHG

## 2022-08-19 DIAGNOSIS — F33.41 RECURRENT MAJOR DEPRESSIVE DISORDER, IN PARTIAL REMISSION: Chronic | ICD-10-CM

## 2022-08-19 DIAGNOSIS — F41.0 GENERALIZED ANXIETY DISORDER WITH PANIC ATTACKS: Chronic | ICD-10-CM

## 2022-08-19 DIAGNOSIS — F41.1 GENERALIZED ANXIETY DISORDER WITH PANIC ATTACKS: Chronic | ICD-10-CM

## 2022-08-19 PROCEDURE — 99214 OFFICE O/P EST MOD 30 MIN: CPT

## 2022-08-19 RX ORDER — TOPIRAMATE 100 MG/1
100 TABLET, FILM COATED ORAL 2 TIMES DAILY
Qty: 180 TABLET | Refills: 3 | Status: SHIPPED | OUTPATIENT
Start: 2022-08-19 | End: 2022-10-07

## 2022-08-19 NOTE — PROGRESS NOTES
Subjective   Hyun Perez is a 42 y.o. female who presents today for follow-up medication management after completing 29 Spravato treatments previously.    Chief Complaint: Desire for more Spravato.    History of Present Illness:  Prior to Spravato:  -Hx of tx resistent depression failing numerous medications over many years, and insomnia:.  -Significant FmHx of successful SA in mother, grandmother and extended family. Her mother passed away early 2021.   -FmHx of bipolar d/o w/o diagnosis herself. Admits to 9 possibly manic symptoms on MDQ.  -On 300mg Wellbutrin daily for depression with little to no efficacy.  -Struggles with SI daily without a plan or anticipating actions.  -Vraylar caused minimal improvements and worsened irritability, also caused oversedation in the day.  -Been reluctant to counseling due to being an atheist and fearing discrimination and judgement.  -Trazodone was not used consistently, and was determined ineffective by the pt.  -Previously c/o tinnitis causing insomnia.  -She failed Restoril for insomnia after 4 pills, but found benefit from Ambien.  -Spravato was previously completed in 5/23/2022 with successful tx of depression with anxiety benefits.    8/19/22: Patient presents with concerns for worsening anxiety and some depressive symptoms, but says her anxiety has especially been troubling.  She says that she has been taking Topamax and Ambien with benefit, and she is not willing to have any medications at this time.  Patient states she is not interested in popping any more pills, and wants more Spravato only. Ambien continues to be used regularly. Patient states her anxiety in the car is especially gotten bad again.  Denies AVH or SI/HI.    The following portions of the patient's history were reviewed and updated as appropriate: allergies, current medications, past family history, past medical history, past social history, past surgical history and problem list.    PAST PSYCHIATRIC  "HISTORY  Diagnosis treated:  Affective/Bipolar Disorder, Anxiety/Panic Disorder, ADHD dx in childhood.  Treatment Type:  Medication Management  Prior Psychiatric Medications:  Bupropion - takes edge off a little a bit.  prozac - inefficacy.  depakote - inefficacy.  seroquel - inefficacy.  zoloft - inefficacy.  paxil - inefficacy.  Duloxetine - inefficacy.  3mg Vraylar - oversedating and caused sugar cravings.  Trazodone - effective at 100mg nightly; makes her feel \"foggy\" and tired.  Lamictal - night sweats and sugar cravings.  Singulair - dep and SI  Spravato - effective at improving and maintaining depression remission.  Trazodone - ineffective for sleep.  Restoril - not helpful  Ambien - effective for insomnia at 5mg nightly.  *Others the patient cannot recall the names of.  Support Groups:  None  Sequelae Of Mental Disorder:  job disruption, social isolation, family disruption, financial hardships, emotional distress.    Interval History  Deteriorated without Spravato.    Side Effects  Denies from Spravato, Topamax, or Ambein.    Problem List:  Patient Active Problem List   Diagnosis   • Seasonal allergies   • Reduced libido   • Class 2 severe obesity due to excess calories with serious comorbidity and body mass index (BMI) of 38.0 to 38.9 in adult ([facility])   • Recurrent major depressive disorder, in full remission ([facility])   • Essential hypertension   • Hot flashes due to menopause   • Generalized anxiety disorder with panic attacks   • Varicose veins of lower extremity   • Mixed hyperlipidemia   • Chest pain   • Epigastric pain   • Diffuse arthralgia   • Insomnia due to other mental disorder   • Bipolar II disorder ([facility])     Allergy:   Allergies   Allergen Reactions   • Antihistamines, Diphenhydramine-Type Mental Status Change   • Banana Nausea Only   • Benadryl [Diphenhydramine] Mental Status Change   • Corticosteroids Mental Status Change   • Kiwi Extract Nausea Only   • Medrol [Methylprednisolone] Mental Status " Change   • Penicillin V Potassium Swelling   • Milk Thistle Other (See Comments)      Discontinued Medications:  Medications Discontinued During This Encounter   Medication Reason   • topiramate (TOPAMAX) 100 MG tablet Reorder     PHYSICAL EXAM:  Wt Readings from Last 5 Encounters:   08/19/22 98.6 kg (217 lb 6.4 oz)   06/29/22 100 kg (221 lb)   03/04/22 110 kg (243 lb)   11/08/21 113 kg (250 lb)   11/03/21 112 kg (248 lb)     BP Readings from Last 5 Encounters:   08/19/22 132/86   06/29/22 129/85   03/04/22 115/81   11/03/21 143/79   09/22/21 136/80     Pulse Readings from Last 5 Encounters:   08/19/22 80   06/29/22 56   03/04/22 85   11/03/21 77   09/22/21 78     SpO2 Readings from Last 5 Encounters:   06/29/22 99%   03/04/22 99%   11/03/21 98%   09/22/21 99%   08/29/21 96%     Previous Spravato Tx monitoring:   BP Prior to tx: 139/88  BP 40 min into tx: 136/83  BP prior to discharge: 136/86  Start time of observation: 1:10 PM  End time of observation: 3:10 PM    Mental Status Exam:   Orientation:  To person, Place, Time and Situation  Memory: Recent and remote memory Intact  Mood/Affect: Anxious and restricted.  Suicidal Ideations: None  Homicidal Ideations:  None  Hallucinations: None  Delusions:  None  Obsessions: None  Behavior and Psychomotor Activity: Appropriate  Speech: Normal  Thought Process: Goal directed and linear.  Associations: Intact  Thought Content: mood congruent   Language: name objects and repeat phrases  Concentration and computation: Good  Attention Span: Fair  Fund of Knowledge: Fair  Reliability: fair  Insight into mental health: Fair  Judgement: Fair  Impulse Control:  Fair  Hygiene: Good  Cooperation:  Cooperative  Eye Contact: Fair  Physical/Medical Issues:  Yes HLD, obesity, HTN, GERD.     MSE reviewed and accepted with changes from the previous visit.    PHQ-9 Depression Screening  Little interest or pleasure in doing things? 0-->not at all   Feeling down, depressed, or hopeless?  1-->several days   Trouble falling or staying asleep, or sleeping too much? 1-->several days   Feeling tired or having little energy? 1-->several days   Poor appetite or overeating? 0-->not at all   Feeling bad about yourself - or that you are a failure or have let yourself or your family down? 0-->not at all   Trouble concentrating on things, such as reading the newspaper or watching television? 1-->several days   Moving or speaking so slowly that other people could have noticed? Or the opposite - being so fidgety or restless that you have been moving around a lot more than usual? 2-->more than half the days   Thoughts that you would be better off dead, or of hurting yourself in some way? 0-->not at all   PHQ-9 Total Score 6   If you checked off any problems, how difficult have these problems made it for you to do your work, take care of things at home, or get along with other people? somewhat difficult     Feeling nervous, anxious or on edge: Several days  Not being able to stop or control worrying: Several days  Worrying too much about different things: Several days  Trouble Relaxing: Several days  Being so restless that it is hard to sit still: Several days  Feeling afraid as if something awful might happen: Several days  Becoming easily annoyed or irritable: Several days  ISIDRO 7 Total Score: 7  If you checked any problems, how difficult have these problems made it for you to do your work, take care of things at home, or get along with other people: Somewhat difficult  PHQ-2: 1; unlikely depression. Previously: 0, 0.  ISIDRO-7: 7; mild anxiety. Previously: 3, 4    Former Smoker  I advised Hyun of the risks of tobacco use.     Assessment/Plan:  Diagnoses and all orders for this visit:    1. Recurrent major depressive disorder, in partial remission (HCC)  -     topiramate (TOPAMAX) 100 MG tablet; Take 1 tablet by mouth 2 (Two) Times a Day.  Dispense: 180 tablet; Refill: 3    2. Generalized anxiety disorder with  panic attacks  -     topiramate (TOPAMAX) 100 MG tablet; Take 1 tablet by mouth 2 (Two) Times a Day.  Dispense: 180 tablet; Refill: 3    PHQ-2: 1; unlikely depression. Previously: 0, 0.  ISIDRO-7: 7; mild anxiety. Previously: 3, 4  -Restart 84mg Spravato to schedule on a monthly basis,  notified.  -Continue 10mg Ambien qhs PRN for insomnia benefit.  -Cont Topamax 100mg BID for mood stabilization.  Refill sent.  -Cont. 300mg Wellbutin daily for depression.  Consider reduction in the future due to possible contribution to anxiety.  -Counseling continues to be encouraged.  -F/U at next Spravato treatment. In the future, continue to monitor sodium and wt.    -Patient would likely benefit from more bipolar control that is likely but is not agreeable to this at this time.    Visit Diagnoses:    ICD-10-CM ICD-9-CM   1. Recurrent major depressive disorder, in partial remission (HCC)  F33.41 296.35   2. Generalized anxiety disorder with panic attacks  F41.1 300.02    F41.0 300.01      TREATMENT PLAN/GOALS: In the short-term, the patient is to continue supportive psychotherapy efforts and medications as indicated. Treatment and medication options were discussed during today's visit. Long-term the goal will be to control symptoms so they do not negatively interfere with other aspects of the patient's life. Prognosis is optimistic with implementation of the current treatment plan. Patient ackowledged and verbally consented to the treatment plan and was educated on the importance of compliance with treatment and follow-up appointments.    MEDICATION ISSUES:  INSPECT reviewed as expected.  Previously took 84mg dose pack of Spravato.  Currently taking 10 mg Ambien.     MEDS ORDERED DURING VISIT:  New Medications Ordered This Visit   Medications   • topiramate (TOPAMAX) 100 MG tablet     Sig: Take 1 tablet by mouth 2 (Two) Times a Day.     Dispense:  180 tablet     Refill:  3     Return With Spravato, for Next scheduled  follow up.     For use when Spravato is administered:  I, Jaleel Byers PA-C, was present in the office suite and immediately available to furnish assistance and direction throughout the entire observation time.     Patient tolerated the procedure well without complications..     Side effects of treatment were mild and included sedation, dizziness, mild euphoria, and mild BP elevation.    After the observation time, the patient was assessed by me and considered stable to leave the office with assistance. Hyun Perez  was advised not to drive or operate machinery until tomorrow following a full night's sleep.    Jaleel Byers PA-C  08/19/22     This document has been electronically signed by Jaleel Byers PA-C  August 19, 2022 16:51 EDT    EMR Dragon transcription disclaimer:  Part of this note may be an electronic transcription/translation of spoken language to printed text using the Dragon Dictation System.

## 2022-08-25 DIAGNOSIS — F51.05 INSOMNIA DUE TO OTHER MENTAL DISORDER: Chronic | ICD-10-CM

## 2022-08-25 DIAGNOSIS — F99 INSOMNIA DUE TO OTHER MENTAL DISORDER: Chronic | ICD-10-CM

## 2022-08-29 RX ORDER — ZOLPIDEM TARTRATE 10 MG/1
10 TABLET ORAL NIGHTLY PRN
Qty: 30 TABLET | Refills: 3 | Status: SHIPPED | OUTPATIENT
Start: 2022-09-01 | End: 2023-01-12 | Stop reason: SDUPTHER

## 2022-08-29 NOTE — TELEPHONE ENCOUNTER
Rx sent to start 9/1/22 since this is when it is due based on fill date of first fill from last Rx. I have sent it starting 9/1/22 with 3 refills to continue.

## 2022-09-01 DIAGNOSIS — I10 ESSENTIAL HYPERTENSION: ICD-10-CM

## 2022-09-01 RX ORDER — LISINOPRIL 10 MG/1
10 TABLET ORAL DAILY
Qty: 90 TABLET | Refills: 1 | Status: SHIPPED | OUTPATIENT
Start: 2022-09-01

## 2022-09-12 ENCOUNTER — TELEPHONE (OUTPATIENT)
Dept: PSYCHIATRY | Facility: CLINIC | Age: 42
End: 2022-09-12

## 2022-09-12 DIAGNOSIS — F41.0 GENERALIZED ANXIETY DISORDER WITH PANIC ATTACKS: Primary | Chronic | ICD-10-CM

## 2022-09-12 DIAGNOSIS — F41.1 GENERALIZED ANXIETY DISORDER WITH PANIC ATTACKS: Primary | Chronic | ICD-10-CM

## 2022-09-12 NOTE — TELEPHONE ENCOUNTER
Patient asking if she can now get some anxiety medication.  She has been doing a lot of travelling for her job and her anxiety and stress level has increased a lot.   She stated you had offered her some in the past and she did not want it. But now it may be beneficial for her.

## 2022-09-16 NOTE — TELEPHONE ENCOUNTER
There are a lot of different ways to go about treating anxiety. I'd recommend Latuda because it is well tolerated can be started at a low dose, but it a medicine that is meant to be taken consistently and daily with full effects of a dose known by 3 weeks. As needed medicine is different, and most have addictive potential and are likely to become problematic over time if not used with other medicine like latuda. One that is not addictive is hydroxyzine that could be attempted.

## 2022-09-18 RX ORDER — DIAZEPAM 5 MG/1
5 TABLET ORAL 2 TIMES DAILY PRN
Qty: 60 TABLET | Refills: 2 | Status: SHIPPED | OUTPATIENT
Start: 2022-09-18

## 2022-09-18 NOTE — TELEPHONE ENCOUNTER
I'll give her some diazepam. She needs to remember that is does not help mood and only helps anxiety, and attempting to use this medication more than twice weeks will be increasing her dependence on the medication. It will set her up for dependence and addiction if she tries to use such a medication long term to control anxiety associated with a mood disorder. Taking the ambein at night with this medication can set up for a possible overdose in her sleep, so only taking it during the day is best. Without other medication being used to help control sleep and anxiety long term she will eventually need these medications to keep anxiety and sleep controlled, and eventually need higher doses to accomplish the same benefit. Long story short, if she doesn't want to take another medication consistently she should use this one for no more than a few months. She should try half a pill twice daily before a whole pill twice daily to see if it is needed at the whole dose. I've sent it in to her [facility]. She will also need to make an appt for no more than 3 months for follow up and a UDS, as we need that on file for this type of medication.

## 2022-09-19 ENCOUNTER — PRIOR AUTHORIZATION (OUTPATIENT)
Dept: PSYCHIATRY | Facility: CLINIC | Age: 42
End: 2022-09-19

## 2022-09-23 NOTE — TELEPHONE ENCOUNTER
Diazepam denied    Faxed pharmacy  Left message for patient. Good Rx at [facility] is $11.33

## 2022-09-25 DIAGNOSIS — J30.2 SEASONAL ALLERGIES: ICD-10-CM

## 2022-09-26 RX ORDER — LEVOCETIRIZINE DIHYDROCHLORIDE 5 MG/1
5 TABLET, FILM COATED ORAL EVERY EVENING
Qty: 90 TABLET | Refills: 1 | Status: SHIPPED | OUTPATIENT
Start: 2022-09-26

## 2022-09-27 DIAGNOSIS — M10.9 GOUTY ARTHRITIS: ICD-10-CM

## 2022-09-27 DIAGNOSIS — M25.50 DIFFUSE ARTHRALGIA: ICD-10-CM

## 2022-09-27 RX ORDER — CELECOXIB 200 MG/1
CAPSULE ORAL
Qty: 180 CAPSULE | Refills: 1 | Status: SHIPPED | OUTPATIENT
Start: 2022-09-27 | End: 2023-02-21 | Stop reason: ALTCHOICE

## 2022-09-27 RX ORDER — ALLOPURINOL 100 MG/1
TABLET ORAL
Qty: 180 TABLET | Refills: 1 | Status: SHIPPED | OUTPATIENT
Start: 2022-09-27 | End: 2023-01-11

## 2022-10-07 ENCOUNTER — TELEPHONE (OUTPATIENT)
Dept: PSYCHIATRY | Facility: CLINIC | Age: 42
End: 2022-10-07

## 2022-10-07 DIAGNOSIS — F41.1 GENERALIZED ANXIETY DISORDER WITH PANIC ATTACKS: Chronic | ICD-10-CM

## 2022-10-07 DIAGNOSIS — F33.41 RECURRENT MAJOR DEPRESSIVE DISORDER, IN PARTIAL REMISSION: Chronic | ICD-10-CM

## 2022-10-07 DIAGNOSIS — F41.0 GENERALIZED ANXIETY DISORDER WITH PANIC ATTACKS: Chronic | ICD-10-CM

## 2022-10-07 RX ORDER — TOPIRAMATE 50 MG/1
TABLET, FILM COATED ORAL
Qty: 90 TABLET | Refills: 1 | Status: SHIPPED | OUTPATIENT
Start: 2022-10-07 | End: 2022-12-20

## 2022-10-07 NOTE — TELEPHONE ENCOUNTER
Pt says Jaleel prescribed Topamax 100 mg BID, which she believes is too much, so she has been cutting the pills in half and taking 100 mg in the am and 50 mg at night.  She says the pills are really difficult to break evenly and she would like some 50 mg pills sent instead so she can take 150 mg total daily.

## 2022-11-21 ENCOUNTER — TELEPHONE (OUTPATIENT)
Dept: FAMILY MEDICINE CLINIC | Facility: CLINIC | Age: 42
End: 2022-11-21

## 2022-11-21 DIAGNOSIS — R92.8 ABNORMAL MAMMOGRAM OF RIGHT BREAST: Primary | ICD-10-CM

## 2022-11-21 NOTE — TELEPHONE ENCOUNTER
Caller: Hyun Perez    Relationship: Self    Best call back number: [phone]    What is the best time to reach you: ANYTIME    Who are you requesting to speak with (clinical staff, provider,  specific staff member): CLINICAL STAFF    Do you know the name of the person who called: PATIENT     What was the call regarding: PATIENT CALLED AND SAID THAT SHE HAD A MAMMOGRAM DONE WITH [facility] ABOUT 6 MONTHS AGO AND SOMETHING HAD BEEN FOUND, SO THEY WANTED HER TO COME BACK IN 6 MONTHS AND HAVE ANOTHER MAMMOGRAM     HOWEVER, SHE HAD CALLED TO SCHEDULE IT AND THE HOSPITALSAID THEY DID NOT HAVE AN ORDER FOR A MAMMOGRAM     SHE IS WANTING TO SEE IF RONNY NEWMAN WOULD BE ABLE TO PLACE THE ORDER FOR HER, SHE SCHEDULED A NEW PATIENT APPOINTMENT WITH RONNY NEWMAN FOR 03/06/22    Do you require a callback: YES

## 2022-12-20 DIAGNOSIS — F33.41 RECURRENT MAJOR DEPRESSIVE DISORDER, IN PARTIAL REMISSION: Chronic | ICD-10-CM

## 2022-12-20 DIAGNOSIS — F41.1 GENERALIZED ANXIETY DISORDER WITH PANIC ATTACKS: Chronic | ICD-10-CM

## 2022-12-20 DIAGNOSIS — F41.0 GENERALIZED ANXIETY DISORDER WITH PANIC ATTACKS: Chronic | ICD-10-CM

## 2022-12-20 RX ORDER — TOPIRAMATE 50 MG/1
TABLET, FILM COATED ORAL
Qty: 90 TABLET | Refills: 1 | Status: SHIPPED | OUTPATIENT
Start: 2022-12-20

## 2022-12-29 ENCOUNTER — CLINICAL SUPPORT (OUTPATIENT)
Dept: PSYCHIATRY | Facility: CLINIC | Age: 42
End: 2022-12-29

## 2022-12-29 DIAGNOSIS — F41.0 GENERALIZED ANXIETY DISORDER WITH PANIC ATTACKS: Primary | Chronic | ICD-10-CM

## 2022-12-29 DIAGNOSIS — F41.1 GENERALIZED ANXIETY DISORDER WITH PANIC ATTACKS: Primary | Chronic | ICD-10-CM

## 2022-12-29 DIAGNOSIS — F99 INSOMNIA DUE TO OTHER MENTAL DISORDER: Chronic | ICD-10-CM

## 2022-12-29 DIAGNOSIS — F51.05 INSOMNIA DUE TO OTHER MENTAL DISORDER: Chronic | ICD-10-CM

## 2023-01-05 ENCOUNTER — TELEPHONE (OUTPATIENT)
Dept: FAMILY MEDICINE CLINIC | Facility: CLINIC | Age: 43
End: 2023-01-05

## 2023-01-05 NOTE — TELEPHONE ENCOUNTER
Pt will need to be seen here first.  We can see about moving her appt up; she doesn't have to be in a new time slot since she eas established with Dr. Cooper.     [facility] Pregnancy And Lactation Text: This medication is Pregnancy Category X and contraindicated in pregnancy and in women who may become pregnant. It is unknown if this medication is excreted in breast milk.

## 2023-01-05 NOTE — TELEPHONE ENCOUNTER
Caller: Hyun Perez    Relationship: Self    Best call back number: [phone]     What is the medical concern/diagnosis: LEFT KNEE PAIN    What specialty or service is being requested: ORTHOPEDIC    What is the provider, practice or medical service name: ANY    What is the office location: ANY    What is the office phone number: ANY    Any additional details: PATIENT PREVIOUSLY WITH DR. GUTIERREZ, NOT SCHEDULED FOR A NEW PATIENT UNTIL MARCH.

## 2023-01-06 NOTE — TELEPHONE ENCOUNTER
Caller: Hyun Perez    Relationship: Self    Best call back number: [phone]    Do you know the name of the person who called: CLYDE    What was the call regarding: PATIENT WAS RETURNING A MISSED CALL    Do you require a callback: YES, ATTEMPTED WARM TRANSFER, NO ANSWER.

## 2023-01-06 NOTE — TELEPHONE ENCOUNTER
Left vm for the pt to call back. OK of the hub to read message from Arabella Foster and to move appointment to any open OV

## 2023-01-11 ENCOUNTER — OFFICE VISIT (OUTPATIENT)
Dept: FAMILY MEDICINE CLINIC | Facility: CLINIC | Age: 43
End: 2023-01-11
Payer: MEDICAID

## 2023-01-11 ENCOUNTER — LAB (OUTPATIENT)
Dept: FAMILY MEDICINE CLINIC | Facility: CLINIC | Age: 43
End: 2023-01-11
Payer: MEDICAID

## 2023-01-11 VITALS
WEIGHT: 219 LBS | SYSTOLIC BLOOD PRESSURE: 145 MMHG | OXYGEN SATURATION: 99 % | HEIGHT: 67 IN | TEMPERATURE: 97.7 F | DIASTOLIC BLOOD PRESSURE: 81 MMHG | BODY MASS INDEX: 34.37 KG/M2 | HEART RATE: 82 BPM

## 2023-01-11 DIAGNOSIS — G89.29 CHRONIC PAIN OF LEFT KNEE: Primary | ICD-10-CM

## 2023-01-11 DIAGNOSIS — N92.6 IRREGULAR MENSTRUAL BLEEDING: ICD-10-CM

## 2023-01-11 DIAGNOSIS — R53.83 OTHER FATIGUE: ICD-10-CM

## 2023-01-11 DIAGNOSIS — M25.562 CHRONIC PAIN OF LEFT KNEE: Primary | ICD-10-CM

## 2023-01-11 PROCEDURE — 84443 ASSAY THYROID STIM HORMONE: CPT

## 2023-01-11 PROCEDURE — 83002 ASSAY OF GONADOTROPIN (LH): CPT

## 2023-01-11 PROCEDURE — 83001 ASSAY OF GONADOTROPIN (FSH): CPT

## 2023-01-11 PROCEDURE — 82672 ASSAY OF ESTROGEN: CPT

## 2023-01-11 PROCEDURE — 99213 OFFICE O/P EST LOW 20 MIN: CPT

## 2023-01-11 PROCEDURE — 36415 COLL VENOUS BLD VENIPUNCTURE: CPT

## 2023-01-11 NOTE — PATIENT INSTRUCTIONS
Work on a healthy diet; limit carbs and sweets; increase plan-based foods.  Exercise at least 150 minutes per week.  Work towards a healthy weight.

## 2023-01-12 DIAGNOSIS — F51.05 INSOMNIA DUE TO OTHER MENTAL DISORDER: Chronic | ICD-10-CM

## 2023-01-12 DIAGNOSIS — F99 INSOMNIA DUE TO OTHER MENTAL DISORDER: Chronic | ICD-10-CM

## 2023-01-12 LAB
FSH SERPL-ACNC: 5.11 MIU/ML
LH SERPL-ACNC: 15.4 MIU/ML
TSH SERPL DL<=0.05 MIU/L-ACNC: 1.61 UIU/ML (ref 0.27–4.2)

## 2023-01-12 RX ORDER — ZOLPIDEM TARTRATE 10 MG/1
10 TABLET ORAL NIGHTLY PRN
Qty: 30 TABLET | Refills: 1 | Status: SHIPPED | OUTPATIENT
Start: 2023-01-12

## 2023-01-12 NOTE — TELEPHONE ENCOUNTER
Rx Refill Note  Requested Prescriptions     Pending Prescriptions Disp Refills   • zolpidem (AMBIEN) 10 MG tablet 30 tablet 3     Sig: Take 1 tablet by mouth At Night As Needed for Sleep.      Last office visit with prescribing clinician: Visit date not found     Next office visit with prescribing clinician: 2/15/2023     Office Visit with Jaleel Byers PA-C (08/19/2022)    [facility] Urine Drug Screen - (12/29/2022)    Inspect fill 11/23/22    Cecile Malik  01/12/23, 15:48 EST

## 2023-01-13 LAB — ESTROGEN SERPL-MCNC: 255 PG/ML

## 2023-01-20 ENCOUNTER — TELEPHONE (OUTPATIENT)
Dept: FAMILY MEDICINE CLINIC | Facility: CLINIC | Age: 43
End: 2023-01-20

## 2023-01-20 DIAGNOSIS — M25.562 LEFT KNEE PAIN, UNSPECIFIED CHRONICITY: Primary | ICD-10-CM

## 2023-01-20 NOTE — TELEPHONE ENCOUNTER
Caller: Hyun Perez    Relationship: Self    Best call back number: [phone]    What is the medical concern/diagnosis: REQUEST FOR PHYSICAL THERAPY AND TO SEE AN ORTHOPEDIC PHYSICIAN    What specialty or service is being requested: REFRRAL    What is the provider, practice or medical service name: WITHIN A NETWORK    Any additional details: AS SOON AS POSSIBLE.  PATIENT IS IN A LOT OF PAIN

## 2023-02-14 ENCOUNTER — OFFICE VISIT (OUTPATIENT)
Dept: ORTHOPEDIC SURGERY | Facility: CLINIC | Age: 43
End: 2023-02-14
Payer: MEDICAID

## 2023-02-14 VITALS — WEIGHT: 219 LBS | HEART RATE: 74 BPM | BODY MASS INDEX: 34.3 KG/M2 | OXYGEN SATURATION: 98 %

## 2023-02-14 DIAGNOSIS — M54.50 LOW BACK PAIN RADIATING TO LEFT LOWER EXTREMITY: ICD-10-CM

## 2023-02-14 DIAGNOSIS — M17.0 BILATERAL PRIMARY OSTEOARTHRITIS OF KNEE: ICD-10-CM

## 2023-02-14 DIAGNOSIS — G89.29 CHRONIC PAIN OF BOTH KNEES: Primary | ICD-10-CM

## 2023-02-14 DIAGNOSIS — M25.561 CHRONIC PAIN OF BOTH KNEES: Primary | ICD-10-CM

## 2023-02-14 DIAGNOSIS — M79.605 LOW BACK PAIN RADIATING TO LEFT LOWER EXTREMITY: ICD-10-CM

## 2023-02-14 DIAGNOSIS — M25.562 CHRONIC PAIN OF BOTH KNEES: Primary | ICD-10-CM

## 2023-02-14 PROCEDURE — 99204 OFFICE O/P NEW MOD 45 MIN: CPT | Performed by: STUDENT IN AN ORGANIZED HEALTH CARE EDUCATION/TRAINING PROGRAM

## 2023-02-14 NOTE — PROGRESS NOTES
"NEW VISIT    Patient: Hyun Perez  ?  YOB: 1980    MRN: [id number]  ?  Chief Complaint   Patient presents with   • Left Knee - Initial Evaluation   • Right Knee - Initial Evaluation      ?  HPI: Patient is presenting today for history of bilateral knee pain.  Focus of today's exam and evaluation was of her left knee as this was the most symptomatic.  She states she has had worsening left knee pain for the last few months.  She denies any acute injury.  Pain is primarily over the lateral aspect of the knee, has a both dull achy pain as well as a burning sensation.  The pain will radiate laterally into her mid thigh as well as down her leg into the bottom of her foot.  The pain is worse while she is on her feet for any amount of time.  She states she has a history of arthritis in \"many joints.\"  She also has chronic low back pain, although has never had a evaluation of this problem.  She had pain in her right knee about a month ago which was evaluated by her PCP with x-rays, although she states her right knee is not giving her much difficulty at this time.  She takes Celebrex daily for her arthritic pain with minimal relief, and has also tried KT taping over the lateral knee based on YouTube videos.  She has a listed allergy to corticosteroids.  No prior formal physical therapy.      Allergies:   Allergies   Allergen Reactions   • Antihistamines, Diphenhydramine-Type Mental Status Change   • Banana Nausea Only   • Benadryl [Diphenhydramine] Mental Status Change   • Corticosteroids Mental Status Change   • Kiwi Extract Nausea Only   • Medrol [Methylprednisolone] Mental Status Change   • Penicillin V Potassium Swelling   • Milk Thistle Other (See Comments)       Past Medical History:   Diagnosis Date   • Anxiety      Past Surgical History:   Procedure Laterality Date   • LEEP  1997   • TUBAL ABDOMINAL LIGATION Bilateral 2015     Social History     Occupational History   • Not on file   Tobacco Use   • " Smoking status: Former   • Smokeless tobacco: Never   Vaping Use   • Vaping Use: Some days   Substance and Sexual Activity   • Alcohol use: Not Currently   • Drug use: Not Currently   • Sexual activity: Defer      Social History     Social History Narrative   • Not on file     Family History   Problem Relation Age of Onset   • Hypertension Mother    • Coronary artery disease Mother         CABG x4 in late 30s   • Depression Mother         mother committed suicide   • Hypertension Maternal Grandmother    • Diabetes Maternal Grandmother    • Depression Maternal Grandmother         committed suicide   • Hypertension Maternal Grandfather    • Diabetes Maternal Grandfather    • Heart attack Maternal Grandfather    • Depression Maternal Cousin         committed suicide   • Depression Maternal Aunt         committed suicide       Review of Systems  Constitutional: Negative.  Negative for fever.   Musculoskeletal: Positive for joint pain  Skin: Negative.  Negative for rash and wound.    Neurological: Negative for numbness.       Body mass index is 34.3 kg/m².  Vitals:    02/14/23 1326   Pulse: 74   SpO2: 98%   Weight: 99.3 kg (219 lb)        Physical Exam  Constitutional: Patient is oriented to person, place, and time. Appears well-developed and well-nourished.   Head: Normocephalic and atraumatic.   Pulmonary/Chest: Effort normal.   Musculoskeletal:   See detailed exam below   Neurological: Alert and oriented to person, place, and time. No sensory deficit. Coordination normal.   Skin: Skin is warm and dry. Capillary refill takes less than 2 seconds. No rash noted. No erythema.     The left knee is without obvious signs of acute bony deformity, quadriceps atrophy, swelling, erythema, ecchymosis or joint effusion.  There is KT tape present over the lateral knee extending to the mid thigh and mid calf.  There is diffuse nonlocalized tenderness throughout the knee including the lateral joint line, LCL, distal IT band and  [place]'s tubercle, pes anserine bursa, and lateral hamstring tendons.  The patella is with mild tenderness over lateral facet.  Mild apprehension with lateral glide. Patella crepitus is negative. Patella grind is negative.  Minimal tenderness over the medial joint line.  Other soft tissue including the  quad tendon, patellar tendon, and proximal gastroc tendon with no tenderness.  Flexion & extension are full and symmetrical with discomfort at endrange extension and flexion.  Knee strength is 5/5 and symmetrical.  Hip strength is 4+/5. Varus & valgus stress, Lachman's, anterior drawer, Estela's, and posterior drawer are all negative. Trendelenburg is positive.  The opposite knee is nontender and stable. Gait is pain-free and tandem.    Positive slump and straight leg raise.  Brisk patellar reflex on the left.  Strength and sensation is intact in bilateral lower extremity.    Diagnostics:  Reviewed images and radiology report of 2 view xrays of Left Knee, from [facility] on 1/11/23, summary of impression below:  Mild to moderate medial osteoarthritic change, with joint space narrowing and subchondral sclerosis    xrays obtained today     Right Knee X-Ray  Indication: Pain    Views: AP, Lateral, and [place]    Findings:  No fracture  No bony lesion  Normal soft tissues  Mild to moderate arthritic change of the medial joint space and patellofemoral compartments with mild joint space narrowing, subchondral sclerosis and small osteophyte formation      Assessment:  Diagnoses and all orders for this visit:    1. Chronic pain of both knees (Primary)  -     XR Knee 3 View Right  -     Ambulatory Referral to Physical Therapy Evaluate and treat; Stretching, ROM, Strengthening    2. Low back pain radiating to left lower extremity  -     Ambulatory Referral to Physical Therapy Evaluate and treat; Stretching, ROM, Strengthening  -     Ambulatory Referral to Neurosurgery      Plan    · Corticosteroid and  viscosupplementation injection discussed   · Physical Therapy discussed and order placed today for both her chronic knee pain and low back pain.  Was also given home exercises for her bilateral knees to start prior to PT initiation.     · Activity modifications discussed and recommended  · Fitted for a knee Redi brace in office today  · Rest, ice, compression, and elevation (RICE) therapy  · Continue prescription Celebrex as needed  · Follow up in 3 month(s)    Above diagnosis and plan discussed with the patient office today.  I did personally review her prior PCP notes as well as left knee x-rays done at [facility] radiology which were significant for mild medial compartment arthritis.  Her pain and the visit today was focused on her left knee symptoms.  As such I obtained x-rays of her left knee which were also remarkable for mild medial compartment arthritis.  She has diffuse nonlocalized pain across the knee but primarily laterally as noted above on her exam.  Ligaments intact with firm endpoint and no noted meniscal symptoms.  Of note she does have chronic low back pain with radicular symptoms down her left lower extremity laterally and over the plantar side of the foot.  She did have a reproduction of the symptoms today with both straight leg and slump testing.  As such I will refer her to neurosurgery for work-up of her low back symptoms as this is likely contributing to her knee pain.  Regarding the knee we discussed different options as noted above.  She has previously failed oral Celebrex, and cannot tolerate corticosteroids due to allergies.  She also brought up and we discussed viscosupplementation injection.  However given her symptoms are more diffuse and not fully explainable by her arthritis I discussed and we elected to start with physical therapy to focus both on her knee as well as low back pain.  He was also fitted for a knee Redi brace in office today.  We will follow-up in 3 months to monitor her  progress, I also encouraged her to follow-up with spine.    Date of encounter: 02/14/2023   Ovidio Hart DO    Electronically signed by Ovidio Hart DO, 02/14/23, 1:39 PM EST.    Disclaimer: Please note that areas of this note were completed with computer voice recognition software.  Quite often unanticipated grammatical, syntax, homophones, and other interpretive errors are inadvertently transcribed by the computer software. Please excuse any errors that have escaped final proofreading.

## 2023-02-17 ENCOUNTER — TELEPHONE (OUTPATIENT)
Dept: FAMILY MEDICINE CLINIC | Facility: CLINIC | Age: 43
End: 2023-02-17

## 2023-02-17 ENCOUNTER — PATIENT ROUNDING (BHMG ONLY) (OUTPATIENT)
Dept: SPORTS MEDICINE | Facility: CLINIC | Age: 43
End: 2023-02-17
Payer: MEDICAID

## 2023-02-17 NOTE — TELEPHONE ENCOUNTER
She can take a multivitamin otc.  I would recommend she schedule a follow up visit to further discuss her symptoms or [place] [facility] [place] .

## 2023-02-17 NOTE — TELEPHONE ENCOUNTER
Caller: Hyun Perez    Relationship: Self     Best call back number: [phone]    What medication are you requesting: VITAMINS    What are your current symptoms: LOSS TASTE AND SMELL, NO APPETITE, COLD, OVERALL DOESN'T FEEL WELL  (PLEASE REFERENCE 1/11/23 VISIT) PATIENT HAS LONG COVID     If a prescription is needed, what is your preferred pharmacy and phone number:      [id number] - [place], [place] - [address] - [phone] [facility] [phone]   [phone]

## 2023-02-20 ENCOUNTER — TELEPHONE (OUTPATIENT)
Dept: FAMILY MEDICINE CLINIC | Facility: CLINIC | Age: 43
End: 2023-02-20

## 2023-02-20 ENCOUNTER — TELEPHONE (OUTPATIENT)
Dept: PSYCHIATRY | Facility: CLINIC | Age: 43
End: 2023-02-20
Payer: MEDICAID

## 2023-02-20 NOTE — PROGRESS NOTES
Subjective   History of Present Illness: Hyun Perez is a 43 y.o. female is being seen for consultation today at the request of Ovidio Hart DO for chronic back and left leg pain.  Patient has a several year history of back pain that she describes is her whole back.  She has had intermittent sciatica symptoms in the past but today's complaints are reported to be different.  Her back pain is intermittent in nature and actually not hurting today.  She complains of mainly left lateral knee pain with occasional radiation into her lateral lower extremity.  She describes this pain as burning achy sensation.  She does not describe any specific dermatomal pattern of pain from her lower back.  Her pain is noticed more when she is sleeping.  Positional changes do not exacerbate her pain.  She reports no numbness and tingling.  She has been on Celebrex chronically for her arthritic pain with minimal relief.  She reports allergy to corticosteroids she reports no bowel/bladder dysfunction or saddle anesthesia    Back Pain  This is a chronic problem. The current episode started more than 1 year ago. The problem occurs daily. The problem has been waxing and waning since onset. The pain is present in the lumbar spine, thoracic spine and gluteal. The quality of the pain is described as burning, shooting, aching and stabbing. The pain radiates to the left knee, left foot and left thigh. The pain is the same all the time. The symptoms are aggravated by lying down, standing and sitting. Associated symptoms include leg pain. Pertinent negatives include no numbness, tingling or weakness. She has tried NSAIDs, heat, chiropractic manipulation, analgesics and muscle relaxant for the symptoms. The treatment provided mild relief.       The following portions of the patient's history were reviewed and updated as appropriate: allergies, current medications, past family history, past medical history, past social history, past surgical history  "and problem list.    Review of Systems   Constitutional: Negative.    HENT: Negative.    Eyes: Negative.    Respiratory: Negative.    Cardiovascular: Negative.    Gastrointestinal: Negative.    Endocrine: Negative.    Genitourinary: Negative.    Musculoskeletal: Positive for arthralgias, back pain and myalgias.   Skin: Negative.    Allergic/Immunologic: Negative.    Neurological: Negative for tingling, weakness and numbness.   Hematological: Negative.    Psychiatric/Behavioral: Positive for sleep disturbance.   All other systems reviewed and are negative.      Objective     ./73   Pulse 76   Ht 170.2 cm (67\")   Wt 94.3 kg (208 lb)   SpO2 100%   BMI 32.58 kg/m²    Body mass index is 32.58 kg/m².      Physical Exam  Neurologic Exam  Bilateral lower extremity motor strength 5/5  2+ patellar reflexes bilaterally  Normal ROM lumbar flexion and lumbar extension      Assessment & Plan   Independent Review of Radiographic Studies:      I personally reviewed the images from the following studies.    CT abdomen and pelvis    Lumbar spine is well aligned with no subluxation.   DDD with endplate changes at L5-S1.  There is a slight levocurvature with apex at L2-L3.    Medical Decision Making:      Hynu Trevino a 43 y.o. female that is presenting to clinic as a new patient for intermittent back and left knee pain.  She had no motor weakness on her physical exam or red flags symptoms. While her complaints may suggest a radicular component, her knee complaints are more focal with no dermatomal pattern into her leg.   This does not rule out any lumbar involvement as I was able to review older imaging showing some degenerative changes at L5-S1 which could possibly indicate some foraminal narrowing affecting her L5.    She does have recent knee imaging that does show mild osteoarthritic changes.  I am recommending a formal course of physical therapy to help with strengthening and will update her imaging with plain " radiographs.  I will also switch out her Celebrex to meloxicam to see if this provides her better anti-inflammatory relief.  Should patient's symptoms continue after 8 to 12 weeks of physical therapy, she should call the office for further recommendations that may include additional imaging.  I encouraged to call the office with questions or concerns.    Diagnoses and all orders for this visit:    1. Chronic bilateral low back pain without sciatica (Primary)  -     Ambulatory Referral to Physical Therapy Evaluate and treat  -     XR Spine Lumbar Complete With Flex & Ext; Future    2. Chronic pain of left knee    3. DDD (degenerative disc disease), lumbar    Other orders  -     meloxicam (MOBIC) 15 MG tablet; Take 1 tablet by mouth Daily.  Dispense: 30 tablet; Refill: 1      Return if symptoms worsen or fail to improve.    This patient was examined wearing appropriate personal protective equipment.     Hyun Perez  reports that she has quit smoking. She has never used smokeless tobacco..       Body mass index is 32.58 kg/m².  BMI is >= 30 and <35. (Class 1 Obesity). The following options were offered after discussion;: exercise counseling/recommendations and nutrition counseling/recommendations      Patient's blood pressure was reviewed.  Recommendations for  a low-salt diet and exercise to maintain/improve BP in addition to taking any presribed medications.             Maame Burgess, APRN  02/21/23  11:15 EST

## 2023-02-20 NOTE — TELEPHONE ENCOUNTER
Provider: RONNY ALANIZ    Caller: MIGUE OCHOA    Relationship to Patient: SELF    Phone Number: [phone]    Reason for Call: PATIENT IS REQUESTING THAT THE OFFICE NOT CALL HER UNTIL LATE AFTERNOONS DUE TO HER WORKING SCHEDULE. SHE SOMETIMES DOES NOT GET HOME FROM WORK UNTIL AFTER 2:30 AM AND WILL NOT ANSWER THE PHONE.

## 2023-02-21 ENCOUNTER — OFFICE VISIT (OUTPATIENT)
Dept: NEUROSURGERY | Facility: CLINIC | Age: 43
End: 2023-02-21
Payer: MEDICAID

## 2023-02-21 VITALS
DIASTOLIC BLOOD PRESSURE: 73 MMHG | WEIGHT: 208 LBS | BODY MASS INDEX: 32.65 KG/M2 | OXYGEN SATURATION: 100 % | SYSTOLIC BLOOD PRESSURE: 120 MMHG | HEIGHT: 67 IN | HEART RATE: 76 BPM

## 2023-02-21 DIAGNOSIS — G89.29 CHRONIC BILATERAL LOW BACK PAIN WITHOUT SCIATICA: Primary | ICD-10-CM

## 2023-02-21 DIAGNOSIS — G89.29 CHRONIC PAIN OF LEFT KNEE: ICD-10-CM

## 2023-02-21 DIAGNOSIS — M54.50 CHRONIC BILATERAL LOW BACK PAIN WITHOUT SCIATICA: Primary | ICD-10-CM

## 2023-02-21 DIAGNOSIS — M25.562 CHRONIC PAIN OF LEFT KNEE: ICD-10-CM

## 2023-02-21 DIAGNOSIS — M51.36 DDD (DEGENERATIVE DISC DISEASE), LUMBAR: ICD-10-CM

## 2023-02-21 PROCEDURE — 99214 OFFICE O/P EST MOD 30 MIN: CPT | Performed by: NURSE PRACTITIONER

## 2023-02-21 RX ORDER — DOXYCYCLINE 100 MG/1
CAPSULE ORAL
COMMUNITY
Start: 2023-02-20

## 2023-02-21 RX ORDER — MELOXICAM 15 MG/1
15 TABLET ORAL DAILY
Qty: 30 TABLET | Refills: 1 | Status: SHIPPED | OUTPATIENT
Start: 2023-02-21

## 2023-02-21 RX ORDER — LEVOCETIRIZINE DIHYDROCHLORIDE 5 MG/1
5 TABLET, FILM COATED ORAL DAILY
COMMUNITY
Start: 2022-09-26 | End: 2023-02-21

## 2023-02-21 RX ORDER — ALLOPURINOL 100 MG/1
TABLET ORAL
COMMUNITY
Start: 2023-02-19

## 2023-02-21 RX ORDER — LAMOTRIGINE 100 MG/1
1 TABLET ORAL DAILY
COMMUNITY
Start: 2023-01-31

## 2023-02-21 RX ORDER — ALBUTEROL SULFATE 90 UG/1
AEROSOL, METERED RESPIRATORY (INHALATION)
COMMUNITY
Start: 2023-02-20

## 2023-02-21 RX ORDER — ZOLPIDEM TARTRATE 10 MG/1
1 TABLET ORAL
COMMUNITY
Start: 2023-01-12 | End: 2023-02-21

## 2023-02-22 ENCOUNTER — PATIENT ROUNDING (BHMG ONLY) (OUTPATIENT)
Dept: NEUROSURGERY | Facility: CLINIC | Age: 43
End: 2023-02-22
Payer: MEDICAID

## 2023-03-23 ENCOUNTER — TELEPHONE (OUTPATIENT)
Dept: FAMILY MEDICINE CLINIC | Facility: CLINIC | Age: 43
End: 2023-03-23
Payer: MEDICAID

## 2023-04-13 ENCOUNTER — TRANSCRIBE ORDERS (OUTPATIENT)
Dept: ADMINISTRATIVE | Facility: HOSPITAL | Age: 43
End: 2023-04-13
Payer: MEDICAID

## 2023-04-13 ENCOUNTER — LAB (OUTPATIENT)
Dept: LAB | Facility: HOSPITAL | Age: 43
End: 2023-04-13
Payer: MEDICAID

## 2023-04-13 DIAGNOSIS — L50.1 IDIOPATHIC URTICARIA: ICD-10-CM

## 2023-04-13 DIAGNOSIS — L50.1 IDIOPATHIC URTICARIA: Primary | ICD-10-CM

## 2023-04-13 LAB
ALBUMIN SERPL-MCNC: 4.3 G/DL (ref 3.5–5.2)
ALBUMIN/GLOB SERPL: 1.7 G/DL
ALP SERPL-CCNC: 65 U/L (ref 39–117)
ALT SERPL W P-5'-P-CCNC: 22 U/L (ref 1–33)
ANION GAP SERPL CALCULATED.3IONS-SCNC: 9.3 MMOL/L (ref 5–15)
AST SERPL-CCNC: 10 U/L (ref 1–32)
BASOPHILS # BLD AUTO: 0.07 10*3/MM3 (ref 0–0.2)
BASOPHILS NFR BLD AUTO: 0.8 % (ref 0–1.5)
BILIRUB SERPL-MCNC: 0.3 MG/DL (ref 0–1.2)
BUN SERPL-MCNC: 17 MG/DL (ref 6–20)
BUN/CREAT SERPL: 21.8 (ref 7–25)
C3 SERPL-MCNC: 139 MG/DL (ref 82–167)
C4 SERPL-MCNC: 27 MG/DL (ref 14–44)
CALCIUM SPEC-SCNC: 9.7 MG/DL (ref 8.6–10.5)
CHLORIDE SERPL-SCNC: 103 MMOL/L (ref 98–107)
CO2 SERPL-SCNC: 24.7 MMOL/L (ref 22–29)
CREAT SERPL-MCNC: 0.78 MG/DL (ref 0.57–1)
DEPRECATED RDW RBC AUTO: 40.1 FL (ref 37–54)
EGFRCR SERPLBLD CKD-EPI 2021: 97.4 ML/MIN/1.73
EOSINOPHIL # BLD AUTO: 0.99 10*3/MM3 (ref 0–0.4)
EOSINOPHIL NFR BLD AUTO: 11.7 % (ref 0.3–6.2)
ERYTHROCYTE [DISTWIDTH] IN BLOOD BY AUTOMATED COUNT: 12.2 % (ref 12.3–15.4)
GLOBULIN UR ELPH-MCNC: 2.5 GM/DL
GLUCOSE SERPL-MCNC: 105 MG/DL (ref 65–99)
HCT VFR BLD AUTO: 39.6 % (ref 34–46.6)
HGB BLD-MCNC: 13.2 G/DL (ref 12–15.9)
IMM GRANULOCYTES # BLD AUTO: 0.02 10*3/MM3 (ref 0–0.05)
IMM GRANULOCYTES NFR BLD AUTO: 0.2 % (ref 0–0.5)
LYMPHOCYTES # BLD AUTO: 1.95 10*3/MM3 (ref 0.7–3.1)
LYMPHOCYTES NFR BLD AUTO: 23.1 % (ref 19.6–45.3)
MCH RBC QN AUTO: 30.4 PG (ref 26.6–33)
MCHC RBC AUTO-ENTMCNC: 33.3 G/DL (ref 31.5–35.7)
MCV RBC AUTO: 91.2 FL (ref 79–97)
MONOCYTES # BLD AUTO: 0.49 10*3/MM3 (ref 0.1–0.9)
MONOCYTES NFR BLD AUTO: 5.8 % (ref 5–12)
NEUTROPHILS NFR BLD AUTO: 4.93 10*3/MM3 (ref 1.7–7)
NEUTROPHILS NFR BLD AUTO: 58.4 % (ref 42.7–76)
NRBC BLD AUTO-RTO: 0 /100 WBC (ref 0–0.2)
PLATELET # BLD AUTO: 315 10*3/MM3 (ref 140–450)
PMV BLD AUTO: 9.9 FL (ref 6–12)
POTASSIUM SERPL-SCNC: 4.1 MMOL/L (ref 3.5–5.2)
PROT SERPL-MCNC: 6.8 G/DL (ref 6–8.5)
RBC # BLD AUTO: 4.34 10*6/MM3 (ref 3.77–5.28)
SODIUM SERPL-SCNC: 137 MMOL/L (ref 136–145)
T4 FREE SERPL-MCNC: 1.26 NG/DL (ref 0.93–1.7)
TSH SERPL DL<=0.05 MIU/L-ACNC: 2.24 UIU/ML (ref 0.27–4.2)
WBC NRBC COR # BLD: 8.45 10*3/MM3 (ref 3.4–10.8)

## 2023-04-13 PROCEDURE — 86003 ALLG SPEC IGE CRUDE XTRC EA: CPT

## 2023-04-13 PROCEDURE — 86160 COMPLEMENT ANTIGEN: CPT

## 2023-04-13 PROCEDURE — 80050 GENERAL HEALTH PANEL: CPT

## 2023-04-13 PROCEDURE — 82785 ASSAY OF IGE: CPT

## 2023-04-13 PROCEDURE — 36415 COLL VENOUS BLD VENIPUNCTURE: CPT

## 2023-04-13 PROCEDURE — 84439 ASSAY OF FREE THYROXINE: CPT

## 2023-04-13 PROCEDURE — 83520 IMMUNOASSAY QUANT NOS NONAB: CPT

## 2023-04-13 PROCEDURE — 86008 ALLG SPEC IGE RECOMB EA: CPT

## 2023-04-15 LAB — ALPHA-GAL IGE QN: 0.25 KU/L

## 2023-04-17 LAB
A ALTERNATA IGE QN: <0.1 KU/L
A FUMIGATUS IGE QN: <0.1 KU/L
BERMUDA GRASS IGE QN: <0.1 KU/L
BOXELDER IGE QN: 0.11 KU/L
C HERBARUM IGE QN: <0.1 KU/L
CALIF WALNUT POLN IGE QN: 0.14 KU/L
CAT DANDER IGE QN: <0.1 KU/L
CMN PIGWEED IGE QN: <0.1 KU/L
COMMON RAGWEED IGE QN: 1.35 KU/L
CONV CLASS DESCRIPTION: ABNORMAL
COTTONWOOD IGE QN: 0.12 KU/L
D FARINAE IGE QN: 0.43 KU/L
D PTERONYSS IGE QN: 0.3 KU/L
DOG DANDER IGE QN: 0.14 KU/L
IGE SERPL-ACNC: 541 IU/ML (ref 6–495)
LONDON PLANE IGE QN: <0.1 KU/L
MOUSE URINE PROT IGE QN: <0.1 KU/L
MT JUNIPER IGE QN: 0.14 KU/L
P NOTATUM IGE QN: <0.1 KU/L
PECAN/HICK TREE IGE QN: 0.1 KU/L
ROACH IGE QN: 2.23 KU/L
SALTWORT IGE QN: 0.13 KU/L
SHEEP SORREL IGE QN: <0.1 KU/L
SILVER BIRCH IGE QN: <0.1 KU/L
TIMOTHY IGE QN: <0.1 KU/L
TRYPTASE SERPL-MCNC: 5 UG/L (ref 2.2–13.2)
WHITE ASH IGE QN: <0.1 KU/L
WHITE ELM IGE QN: 0.12 KU/L
WHITE MULBERRY IGE QN: <0.1 KU/L
WHITE OAK IGE QN: 0.12 KU/L

## 2023-04-24 RX ORDER — MELOXICAM 15 MG/1
15 TABLET ORAL DAILY
Qty: 30 TABLET | Refills: 1 | Status: SHIPPED | OUTPATIENT
Start: 2023-04-24

## 2023-04-24 NOTE — TELEPHONE ENCOUNTER
Rx Refill Note  Requested Prescriptions     Pending Prescriptions Disp Refills   • meloxicam (MOBIC) 15 MG tablet [Pharmacy Med Name: MELOXICAM 15MG TABLETS] 30 tablet 1     Sig: TAKE 1 TABLET BY MOUTH DAILY      Last office visit with prescribing clinician: 2/21/2023   Last telemedicine visit with prescribing clinician: Visit date not found   Next office visit with prescribing clinician: Visit date not found                         Would you like a call back once the refill request has been completed: [] Yes [] No    If the office needs to give you a call back, can they leave a voicemail: [] Yes [] No    Erica Decker MA  04/24/23, 13:30 EDT

## 2023-05-04 NOTE — PROGRESS NOTES
Visit Type:  New Problem  Referring Provider:  Myrtle Montalvo MD  Primary Provider:  Katia WYLIE MD      History of Present Illness:  took xanax x 10 years-stopped in november, now with sleep issues and grinding teeth  trying melatonin    allergies-flonase and allegra now, is there something better?    painful veins in LE       Past Medical History:     Reviewed history from 01/08/2019 and no changes required:        Hypertension        Anxiety Disorder        Depression                        pap-2 years-gyn            Past Surgical History:     Reviewed history from 01/29/2016 and no changes required:        Tubes tied- 2015    Family History Summary:      Reviewed history Last on 01/08/2019 and no changes required:05/24/2019      General Comments - FH:  Mom- comitted sucide  Dad- Healthy    Social History:     Reviewed history from 02/20/2019 and no changes required:        Passive Smoke: Y        Alcohol Use: N        HIV/High Risk: N        Regular Exercise: N        Alcohol Use - no        Drug Use - no        HIV/High Risk - no        NEG FLU VACC 2018/2019                Smoking History:        Patient currently smokes every day.        Patient has been counseled to quit.      Active Medications (reviewed today):  FLUTICASONE PROPIONATE 50 MCG/ACT NASAL SUSPENSION (FLUTICASONE PROPIONATE) Inhale 2 spray in each nostril once daily  SERTRALINE 100MG TABLETS (SERTRALINE HCL) TAKE 1 AND 1/2 TABLETS BY MOUTH EVERY NIGHT AT BEDTIME  OMEPRAZOLE 20MG CAPSULES (OMEPRAZOLE) TAKE 1 CAPSULE BY MOUTH TWICE DAILY  LISINOPRIL 10MG TABLETS (LISINOPRIL) TAKE 1 TABLET BY MOUTH EVERY DAY    Current Allergies (reviewed today):  PENICILLIN V POTASSIUM (PENICILLIN V POTASSIUM TABS) (Critical)    Current Medications (including medications started today):   SINGULAIR 10 MG ORAL TABLET (MONTELUKAST SODIUM) 1 tab daily  ASTEPRO 0.15 % NASAL SOLUTION (AZELASTINE HCL) 2 sprays each nostril bid  ROBAXIN 500 MG ORAL TABLET  (METHOCARBAMOL) 1-2 tabs at night  SERTRALINE 100MG TABLETS (SERTRALINE HCL) TAKE 1 AND 1/2 TABLETS BY MOUTH EVERY NIGHT AT BEDTIME  OMEPRAZOLE 20MG CAPSULES (OMEPRAZOLE) TAKE 1 CAPSULE BY MOUTH TWICE DAILY  LISINOPRIL 10MG TABLETS (LISINOPRIL) TAKE 1 TABLET BY MOUTH EVERY DAY      Risk Factors:     Smoked Tobacco Use:  Current every day smoker     Counseled to quit/cut down:  yes  Drug use:  no  HIV high-risk behavior:  no  Alcohol use:  no        Vital Signs:    Patient Profile:    39 Years Old Female  Height:     67 inches (172 cm)  Weight:     226 pounds  BMI:        35.39     O2 Sat:     98 %  Pulse rate: 76 / minute  BP Sittin / 80  (left arm)    Cuff size:  large    Medications: Medications were reviewed with the patient during this visit.  Allergies: Allergies were reviewed with the patient during this visit.        Vitals Entered By: Lily HOLT (May 23, 2019 11:17 AM)      Physical Exam    General:      well developed, well nourished, in no acute distress.    Head:      normocephalic and atraumatic.    Msk:      Large varicose veins on the right lower extremity  Psych:      alert and cooperative; normal mood and affect; normal attention span and concentration.        Blood Pressure:  Today's BP: 117/80 mm Hg    Labwork:   Most Recent Lab Results:   LDL: 160 mg/dL 2019  HbA1c: : 5.4 % 2019        Impression & Recommendations:    Problem # 1:  Varicose veins of bilateral legs with pain (ICD-454.8) (AEF02-K20.813)   surgical consult    Problem # 2:  Allergies, seasonal (ICD-477.0) (JWY75-I79.2)   try antihistamine nasal spray and Singulair    Medications Added to Medication List This Visit:  1)  Singulair 10 Mg Oral Tablet (Montelukast sodium) .... 1 tab daily  2)  Astepro 0.15 % Nasal Solution (Azelastine hcl) .... 2 sprays each nostril bid  3)  Robaxin 500 Mg Oral Tablet (Methocarbamol) .... 1-2 tabs at night      Patient Instructions:  1)  vascular                        Medication  Administration    Orders Added:  1)  Vascular Surgery Consult [VasOC]  2)  52730-Ouy Vst-Est Level III [CPT-52276]  ]      Electronically signed by Myrtle Montalvo MD on 05/24/2019 at 10:17 AM  ________________________________________________________________________       Disclaimer: Converted Note message may not contain all data elements that existed in the legacy source system. Please see [employer] for the original note details.   Anesthesia Type: 1% lidocaine with 1:100,000 epinephrine and a 1:10 solution of 8.4% sodium bicarbonate

## 2023-05-08 DIAGNOSIS — J30.2 SEASONAL ALLERGIES: ICD-10-CM

## 2023-05-08 RX ORDER — LEVOCETIRIZINE DIHYDROCHLORIDE 5 MG/1
5 TABLET, FILM COATED ORAL EVERY EVENING
Qty: 90 TABLET | Refills: 1 | OUTPATIENT
Start: 2023-05-08

## 2023-05-23 DIAGNOSIS — M25.50 DIFFUSE ARTHRALGIA: ICD-10-CM

## 2023-05-23 RX ORDER — ALLOPURINOL 100 MG/1
TABLET ORAL
Qty: 180 TABLET | OUTPATIENT
Start: 2023-05-23

## 2023-05-23 RX ORDER — CELECOXIB 200 MG/1
CAPSULE ORAL
Qty: 180 CAPSULE | Refills: 1 | OUTPATIENT
Start: 2023-05-23

## 2023-06-13 ENCOUNTER — OFFICE VISIT (OUTPATIENT)
Dept: ORTHOPEDIC SURGERY | Facility: CLINIC | Age: 43
End: 2023-06-13
Payer: MEDICAID

## 2023-06-13 VITALS — OXYGEN SATURATION: 98 % | WEIGHT: 208 LBS | HEART RATE: 100 BPM | BODY MASS INDEX: 32.65 KG/M2 | HEIGHT: 67 IN

## 2023-06-13 DIAGNOSIS — M22.2X2 PATELLOFEMORAL SYNDROME OF BOTH KNEES: ICD-10-CM

## 2023-06-13 DIAGNOSIS — M25.562 CHRONIC PAIN OF LEFT KNEE: Primary | ICD-10-CM

## 2023-06-13 DIAGNOSIS — F31.81 BIPOLAR II DISORDER: Chronic | ICD-10-CM

## 2023-06-13 DIAGNOSIS — M25.562 MECHANICAL PAIN OF LEFT KNEE: ICD-10-CM

## 2023-06-13 DIAGNOSIS — M17.12 PRIMARY OSTEOARTHRITIS OF LEFT KNEE: ICD-10-CM

## 2023-06-13 DIAGNOSIS — M22.2X1 PATELLOFEMORAL SYNDROME OF BOTH KNEES: ICD-10-CM

## 2023-06-13 DIAGNOSIS — G89.29 CHRONIC PAIN OF LEFT KNEE: Primary | ICD-10-CM

## 2023-06-13 DIAGNOSIS — M25.562 LATERAL JOINT LINE TENDERNESS OF KNEE, LEFT: ICD-10-CM

## 2023-06-13 NOTE — PROGRESS NOTES
"FOLLOW UP VISIT    Patient: Hyun Perez  ?  YOB: 1980    MRN: [id number]  ?  Chief Complaint   Patient presents with    Left Knee - Follow-up      ?  HPI: Patient following up for left knee pain.  She is very anxious and tearful during discussion.  She states she has continued to have severe 10/10 left knee pain that is limiting any activity at this time.  Is diffusely across the knee although worst over the medial aspect.  Is difficult to bend or straighten, with occasional swelling.  Did not attempt to schedule physical therapy as \" she is often forgetful, misses appointments and does not want to waste their time,\" as well as she does not feel that she will benefit from PT.  She is also very fearful of steroid injections as her mother who also had mental health issues had had frequent steroid injections, patient feels \"contributed to her eventual death, and worsened her mental health state.\"  She states prior oral steroids have caused increased anxiety and mood changes.  She has had no benefit with oral Celebrex or Mobic, bracing, or activity modifications.  Has been evaluated by neurosurgery/spine since previous office visit, and recommended 2 to 3 months of physical therapy with reevaluation afterwards.     Allergies:   Allergies   Allergen Reactions    Antihistamines, Diphenhydramine-Type Mental Status Change    Banana Nausea Only    Benadryl [Diphenhydramine] Mental Status Change    Corticosteroids Mental Status Change    Kiwi Extract Nausea Only    Medrol [Methylprednisolone] Mental Status Change    Penicillin V Potassium Swelling    Milk Thistle Other (See Comments)       Past Medical History:   Diagnosis Date    Anxiety     Knee swelling 2022    Low back pain     Scoliosis In the 90s     Past Surgical History:   Procedure Laterality Date    LEEP  1997    TUBAL ABDOMINAL LIGATION Bilateral 2015     Social History     Occupational History    Not on file   Tobacco Use    Smoking status: Former " "    Packs/day: 1.00     Years: 15.00     Pack years: 15.00     Types: Cigarettes     Start date: 1993     Quit date: 2018     Years since quittin.4    Smokeless tobacco: Never   Vaping Use    Vaping Use: Some days   Substance and Sexual Activity    Alcohol use: Not Currently    Drug use: Not Currently    Sexual activity: Not Currently      Social History     Social History Narrative    Not on file     Family History   Problem Relation Age of Onset    Hypertension Mother     Coronary artery disease Mother         CABG x4 in late 30s    Depression Mother         mother committed suicide    Hypertension Maternal Grandmother     Diabetes Maternal Grandmother     Depression Maternal Grandmother         committed suicide    Hypertension Maternal Grandfather     Diabetes Maternal Grandfather     Heart attack Maternal Grandfather     Depression Maternal Cousin         committed suicide    Depression Maternal Aunt         committed suicide    Cancer Paternal Grandfather          from colon cancer       Review of Systems  Constitutional: Negative.  Negative for fever.   Musculoskeletal: Positive for joint pain  Skin: Negative.  Negative for rash and wound.    Neurological: Negative for numbness.       Body mass index is 32.58 kg/m².  Vitals:    23 1355   Pulse: 100   SpO2: 98%   Weight: 94.3 kg (208 lb)   Height: 170.2 cm (67\")        Physical Exam  Constitutional: Patient is oriented to person, place, and time. Appears well-developed and well-nourished.   Head: Normocephalic and atraumatic.   Pulmonary/Chest: Effort normal.   Musculoskeletal:   See detailed exam below   Neurological: Alert and oriented to person, place, and time. No sensory deficit. Coordination normal.   Skin: Skin is warm and dry. Capillary refill takes less than 2 seconds. No rash noted. No erythema.   Psych: Alternating between anxious and depressed mood, tearful.  Pressured speech.    The left knee is without obvious signs of acute " bony deformity, quadriceps atrophy, swelling, erythema, ecchymosis or joint effusion. There is more localized tenderness on exam today primarily to the lateral facets of the patella as well as the medial joint line.  Mild apprehension with lateral glide. Patella crepitus is negative. Patella grind is positive. Tenderness over the medial joint line.  Other soft tissue including the quad tendon, patellar tendon, and proximal gastroc tendon with no tenderness.  Flexion limited to 90 degrees, with guarding and significant discomfort at end range of motion.  Extension full although pain at end range.  Knee strength is 5/5 and symmetrical.  Hip strength is 4/5.  Eastman positive for pain over the medial joint line.  Varus & valgus stress, Lachman's, anterior drawer, and posterior drawer are all negative. Trendelenburg is positive.  The opposite knee is nontender and stable. Gait is pain-free and tandem.      Diagnostics:    No new imaging today.    Reviewed images and radiology report of 2 view xrays of Left Knee, from [facility] on 1/11/23, summary of impression below:  Mild to moderate medial osteoarthritic change, with joint space narrowing and subchondral sclerosis    xrays obtained today     Right Knee X-Ray  Indication: Pain    Views: AP, Lateral, and [place]    Findings:  No fracture  No bony lesion  Normal soft tissues  Mild to moderate arthritic change of the medial joint space and patellofemoral compartments with mild joint space narrowing, subchondral sclerosis and small osteophyte formation      Assessment:  Diagnoses and all orders for this visit:    1. Chronic pain of left knee (Primary)  -     MRI Knee Left Without Contrast; Future  -     Visco Treatment; Future    2. Lateral joint line tenderness of knee, left  -     MRI Knee Left Without Contrast; Future    3. Mechanical pain of left knee  -     MRI Knee Left Without Contrast; Future    4. Primary osteoarthritis of left knee  -     Visco Treatment;  Future    5. Patellofemoral syndrome of both knees    6. Bipolar II disorder        Plan    Patient has significant fear of potential corticosteroid injections given prior negative reaction with mental health present in her mother,  as well as increased anxiety and worsening mental health with prior oral steroids.  Such we will avoid any corticosteroid injections.  We did discuss viscosupplementation injection as an alternative for her mild medial compartment osteoarthritis, for which we will attempt approval.  Given her continued severe pain, with medial joint line tenderness and loss of range of motion will order MRI to evaluate for meniscal tear  Discussed in depth physical therapy for both her knee and the low back pain is an important component of her comprehensive treatment.  She also has significant patellofemoral symptoms that would likely improve with physical therapy.  Activity modifications discussed and recommended  No improvement previously with a Redi brace and patient has discontinued at this time.  Rest, ice, compression, and elevation (RICE) therapy  Discussed potential alternative NSAID to Celebrex or Mobic to see if patient would have better therapeutic results.  Discussed potential diclofenac or naproxen.  Patient declining as does not feel this will help her symptoms.  Follow up after MRI and Visco approval.    Above diagnosis and plan discussed with the patient in office today.  Patient was very anxious today in the office with mood alternating between anxious and tearful regarding the severity of her left knee pain and multiple treatment options we discussed, particular corticosteroid injections or physical therapy.  An exacerbation of her anxiety or mood symptoms is a great concern for her for both potential corticosteroid injection or regular physical therapy.  She is followed by psychiatry for her mental health.  I do feel the majority of her symptoms are likely coming from patellofemoral  syndrome and patella chondromalacia which would likely benefit from regular physical therapy.  However given the severity of her pain with decreased knee motion, inability to complete even daily activities per patient the severity of pain, and significant medial joint line tenderness, will order MRI to evaluate medial meniscus and for other structural causes of her knee pain.  We will also work on approval for viscosupplementation injection for treatment of her underlying mild knee osteoarthritis.  I ultimately do feel that physical therapy will be the most beneficial for her knee pain long-term, as well as recommendation by neurosurgery for treatment of her low back pain.  Uncertain if it is mainly anxiety that is the primary barrier to regular physical therapy, but may need to discuss with her mental health provider at a future time.    Date of encounter: 6/13/2023  Ovidio Hart DO      Disclaimer: Please note that areas of this note were completed with computer voice recognition software.  Quite often unanticipated grammatical, syntax, homophones, and other interpretive errors are inadvertently transcribed by the computer software. Please excuse any errors that have escaped final proofreading.

## 2023-09-11 RX ORDER — MELOXICAM 15 MG/1
15 TABLET ORAL DAILY
Qty: 30 TABLET | Refills: 1 | OUTPATIENT
Start: 2023-09-11

## 2023-09-11 NOTE — TELEPHONE ENCOUNTER
Rx Refill Note  Requested Prescriptions     Pending Prescriptions Disp Refills    meloxicam (MOBIC) 15 MG tablet [Pharmacy Med Name: MELOXICAM 15MG TABLETS] 30 tablet 1     Sig: TAKE 1 TABLET BY MOUTH DAILY      Last office visit with prescribing clinician: 2/21/2023   Last telemedicine visit with prescribing clinician: Visit date not found   Next office visit with prescribing clinician: Visit date not found                         Would you like a call back once the refill request has been completed: [] Yes [] No    If the office needs to give you a call back, can they leave a voicemail: [] Yes [] No    Erica Decker MA  09/11/23, 12:09 EDT

## 2023-10-26 ENCOUNTER — HOSPITAL ENCOUNTER (EMERGENCY)
Facility: HOSPITAL | Age: 43
Discharge: HOME | End: 2023-10-26
Payer: MEDICAID

## 2023-10-26 VITALS
HEART RATE: 66 BPM | SYSTOLIC BLOOD PRESSURE: 127 MMHG | BODY MASS INDEX: 30.42 KG/M2 | DIASTOLIC BLOOD PRESSURE: 84 MMHG | RESPIRATION RATE: 18 BRPM | WEIGHT: 193.78 LBS | TEMPERATURE: 97.6 F | HEIGHT: 67 IN | OXYGEN SATURATION: 99 %

## 2023-10-26 DIAGNOSIS — S41.111A LACERATION OF RIGHT UPPER EXTREMITY, INITIAL ENCOUNTER: ICD-10-CM
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
## 2023-10-26 PROCEDURE — 90715 TDAP VACCINE 7 YRS/> IM: CPT | Performed by: NURSE PRACTITIONER

## 2023-10-26 PROCEDURE — 25010000002 TETANUS-DIPHTH-ACELL PERTUSSIS 5-2.5-18.5 LF-MCG/0.5 SUSPENSION PREFILLED SYRINGE: Performed by: NURSE PRACTITIONER

## 2023-10-26 PROCEDURE — 99283 EMERGENCY DEPT VISIT LOW MDM: CPT

## 2023-10-26 PROCEDURE — 90471 IMMUNIZATION ADMIN: CPT | Performed by: NURSE PRACTITIONER

## 2023-10-26 RX ORDER — SULFAMETHOXAZOLE AND TRIMETHOPRIM 800; 160 MG/1; MG/1
1 TABLET ORAL 2 TIMES DAILY
Qty: 10 TABLET | Refills: 0 | Status: SHIPPED | OUTPATIENT
Start: 2023-10-26 | End: 2023-10-31

## 2023-10-26 RX ORDER — MUPIROCIN CALCIUM 20 MG/G
1 CREAM TOPICAL 3 TIMES DAILY
Qty: 15 G | Refills: 0 | Status: SHIPPED | OUTPATIENT
Start: 2023-10-26

## 2023-10-26 RX ORDER — METRONIDAZOLE 500 MG/1
500 TABLET ORAL 3 TIMES DAILY
Qty: 15 TABLET | Refills: 0 | Status: SHIPPED | OUTPATIENT
Start: 2023-10-26 | End: 2023-10-31

## 2023-10-26 RX ADMIN — TETANUS TOXOID, REDUCED DIPHTHERIA TOXOID AND ACELLULAR PERTUSSIS VACCINE, ADSORBED 0.5 ML: 5; 2.5; 8; 8; 2.5 SUSPENSION INTRAMUSCULAR at 07:54

## 2023-10-26 RX ADMIN — Medication 3 ML: at 08:16

## 2023-10-26 NOTE — DISCHARGE INSTRUCTIONS
See provided instructions for laceration care.  Watch for signs of infection.  May use Tylenol and Motrin.  Follow-up with your family doctor.  Return for any new or worsening symptoms

## 2023-10-26 NOTE — ED PROVIDER NOTES
Subjective   History of Present Illness  Chief complaint: Dog bite      Context: Patient is a 43-year-old female who presents with complaints of a dog bite that happened shortly prior to arrival.  She is unsure of when her last tetanus shot was but does not think she has had one as an adult.  She states it was her dog and she was trying to break up her 2 dogs that were in a fight.  She is complaining of laceration to her right forearm abrasions to her left hand and right thigh from teeth.        PCP:          Review of Systems   Constitutional:  Negative for fever.       Past Medical History:   Diagnosis Date    Anxiety     Knee swelling     Low back pain     Scoliosis In the        Allergies   Allergen Reactions    Antihistamines, Diphenhydramine-Type Mental Status Change    Banana Nausea Only    Benadryl [Diphenhydramine] Mental Status Change    Corticosteroids Mental Status Change    Kiwi Extract Nausea Only    Medrol [Methylprednisolone] Mental Status Change    Penicillin V Potassium Swelling    Milk Thistle Other (See Comments)       Past Surgical History:   Procedure Laterality Date    LEEP  1997    TUBAL ABDOMINAL LIGATION Bilateral 2015       Family History   Problem Relation Age of Onset    Hypertension Mother     Coronary artery disease Mother         CABG x4 in late 30s    Depression Mother         mother committed suicide    Hypertension Maternal Grandmother     Diabetes Maternal Grandmother     Depression Maternal Grandmother         committed suicide    Hypertension Maternal Grandfather     Diabetes Maternal Grandfather     Heart attack Maternal Grandfather     Depression Maternal Cousin         committed suicide    Depression Maternal Aunt         committed suicide    Cancer Paternal Grandfather          from colon cancer       Social History     Socioeconomic History    Marital status: Single   Tobacco Use    Smoking status: Former     Packs/day: 1.00     Years: 15.00     Additional pack  years: 0.00     Total pack years: 15.00     Types: Cigarettes     Start date: 1993     Quit date: 2018     Years since quittin.8    Smokeless tobacco: Never   Vaping Use    Vaping Use: Some days   Substance and Sexual Activity    Alcohol use: Not Currently    Drug use: Not Currently    Sexual activity: Not Currently           Objective   Physical Exam    Vital signs and traige nurse note reviewed.  Constitutional:  Awake, alert; well developed and well nourished.  Tearful the patient is examined in hospital gown.  HEENT:  Normocephalic, atraumatic;  with intact EOM; oropharynx is pink and moist without edema or erythema.  Neck: Supple, full range of motion without pain;   Cardiovascular: Regular rate and rhythm,    Pulmonary: Respiratory effort regular, nonlabored;   Skin: There are superficial nonbleeding abrasions noted to the dorsum of the left hand without any underlying swelling or bruising or bony tenderness; superficial 0.5 cm laceration noted to the volar aspect of the left forearm not extending into any muscle or deep tissue; superficial less than 0.5 cm laceration noted on the anterior proximal thigh  Neuro: Alert oriented x3, speech is clear and appropriate.      Procedures           ED Course      Labs Reviewed - No data to display  Medications   Lido-EPINEPHrine-Tetracaine 4-0.05-0.5 % gel 3 mL (3 mL Topical Given 10/26/23 0816)   Tetanus-Diphth-Acell Pertussis (BOOSTRIX) injection 0.5 mL (0.5 mL Intramuscular Given 10/26/23 0754)     No radiology results for the last day  Prior to Admission medications    Medication Sig Start Date End Date Taking? Authorizing Provider   albuterol sulfate  (90 Base) MCG/ACT inhaler  23   Dorina Crisostomo MD   allopurinol (ZYLOPRIM) 100 MG tablet  23   Dorina Crisostomo MD   atorvastatin (LIPITOR) 20 MG tablet TAKE 1 TABLET BY MOUTH EVERY NIGHT 22   Asiya Cooper MD   buPROPion XL (WELLBUTRIN XL) 300 MG 24 hr tablet TAKE  1 TABLET BY MOUTH DAILY 6/10/22   Jaleel Byers PA-C   clindamycin (CLINDAGEL) 1 % gel APPLY PEA SIZED AMOUNT TOPICALLY TO THE AFFECTED AREA EVERY MORNING 3/15/22   Dorina Crisostomo MD   diazePAM (Valium) 5 MG tablet Take 1 tablet by mouth 2 (Two) Times a Day As Needed for Anxiety. Need to see provider for more. 9/18/22   Jaleel Byers PA-C   doxycycline (MONODOX) 100 MG capsule  2/20/23   Dorina Crisostomo MD   Esketamine HCl, 84 MG Dose, (Spravato, 84 MG Dose,) Nasal Solution 6 sprays into the nostril(s) as directed by provider 1 (One) Time Per Week. 3/13/22   Jaleel Byers PA-C   lamoTRIgine (LaMICtal) 100 MG tablet Take 1 tablet by mouth Daily. 1/31/23   Dorina Crisostomo MD   levocetirizine (XYZAL) 5 MG tablet TAKE 1 TABLET BY MOUTH EVERY EVENING 9/26/22   Tanja Zhao PA   lisdexamfetamine (VYVANSE) 40 MG capsule Take 1 capsule by mouth Every Morning    Dorina Crisostomo MD   lisinopril (PRINIVIL,ZESTRIL) 10 MG tablet TAKE 1 TABLET BY MOUTH DAILY 9/1/22   Asiya Cooper MD   meloxicam (MOBIC) 15 MG tablet TAKE 1 TABLET BY MOUTH DAILY 6/20/23   Maame Burgess APRN   metroNIDAZOLE (FLAGYL) 500 MG tablet Take 1 tablet by mouth 3 (Three) Times a Day for 5 days. 10/26/23 10/31/23  Melissa Leigh APRN   omeprazole (priLOSEC) 40 MG capsule Take 1 capsule by mouth Daily.    Dorina Crisostomo MD   sulfamethoxazole-trimethoprim (BACTRIM DS,SEPTRA DS) 800-160 MG per tablet Take 1 tablet by mouth 2 (Two) Times a Day for 5 days. 10/26/23 10/31/23  Melissa Leigh APRN   topiramate (TOPAMAX) 50 MG tablet TAKE 2 TABLETS BY MOUTH EVERY MORNING AND 1 TABLET BY MOUTH EVERY NIGHT AT BEDTIME 12/20/22   Alisha Penaloza APRN   tretinoin (RETIN-A) 0.05 % cream APPLY PEA SIZED AMOUNT TOPICALLY TO THE AFFECTED AREA AT BEDTIME. START 2 NIGHTS EVERY WEEK. INCREASE AS TOLERATED 3/15/22   Provider, MD Dorina   triamcinolone (KENALOG) 0.1 % ointment Apply  "0.1 application topically to the appropriate area as directed 2 (Two) Times a Day. 12/24/21   Provider, MD Dorina   zolpidem (AMBIEN) 10 MG tablet Take 1 tablet by mouth At Night As Needed for Sleep. 1/12/23   Alisha Penaloza APRN                                          Medical Decision Making      /83   Pulse 74   Temp 97.6 °F (36.4 °C) (Oral)   Resp 18   Ht 170.2 cm (67\")   Wt 87.9 kg (193 lb 12.6 oz)   LMP  (LMP Unknown)   SpO2 96%   BMI 30.35 kg/m²           Radiology interpretation: Considered but not felt to be emergently warranted as she has no underlying bony tenderness         Appropriate PPE worn during exam.  Patient was updated on her tetanus and had wounds irrigated cleansed and dressed.  There is no bony tenderness to palpation although fracture was considered as a differential.  Lacerations are superficial in nature and small in length therefore they were Steri-Stripped and not sutured.  She notes a penicillin allergy and will be discharged with Bactrim and Flagyl and we discussed follow-up for wound reevaluation and signs and symptoms of infection.         i discussed findings with patient who voices understanding of discharge instructions, signs and symptoms requiring return to ED; discharged improved and in stable condition with follow up for re-evaluation.  This document is intended for medical expert use only. Reading of this document by patients and/or patient's family without participating medical staff guidance may result in misinterpretation and unintended morbidity.  Any interpretation of such data is the responsibility of the patient and/or family member responsible for the patient in concert with their primary or specialist providers, not to be left for sources of online searches such as [employer], [employer] or similar queries. Relying on these approaches to knowledge may result in misinterpretation, misguided goals of care and even death should patients or family " members try recommendations outside of the realm of professional medical care in a supervised inpatient environment.         Risk  Prescription drug management.        Final diagnoses:   Dog bite, initial encounter   Laceration of right upper extremity, initial encounter       ED Disposition  ED Disposition       ED Disposition   Discharge    Condition   Stable    Comment   --               Ciara Quinteros, APRN  [address]  [address]  [place] [address] [phone]    Schedule an appointment as soon as possible for a visit   For wound re-check         Medication List        New Prescriptions      metroNIDAZOLE 500 MG tablet  Commonly known as: FLAGYL  Take 1 tablet by mouth 3 (Three) Times a Day for 5 days.     sulfamethoxazole-trimethoprim 800-160 MG per tablet  Commonly known as: BACTRIM DS,SEPTRA DS  Take 1 tablet by mouth 2 (Two) Times a Day for 5 days.               Where to Get Your Medications        These medications were sent to [id number] - [place], [place] - [address] AT [facility] - [phone]  - [phone]   [address] [place] [address]      Phone: [phone]   metroNIDAZOLE 500 MG tablet  sulfamethoxazole-trimethoprim 800-160 MG per tablet            Melissa Leigh, APRN  10/26/23 0809

## 2023-11-06 ENCOUNTER — TREATMENT (OUTPATIENT)
Dept: PHYSICAL THERAPY | Facility: CLINIC | Age: 43
End: 2023-11-06
Payer: MEDICAID

## 2023-11-06 DIAGNOSIS — M17.12 PRIMARY OSTEOARTHRITIS OF LEFT KNEE: Primary | ICD-10-CM

## 2023-11-06 DIAGNOSIS — M25.562 CHRONIC PAIN OF LEFT KNEE: ICD-10-CM

## 2023-11-06 DIAGNOSIS — S83.242A OTHER TEAR OF MEDIAL MENISCUS OF LEFT KNEE AS CURRENT INJURY, INITIAL ENCOUNTER: ICD-10-CM

## 2023-11-06 DIAGNOSIS — M54.50 LOW BACK PAIN POTENTIALLY ASSOCIATED WITH RADICULOPATHY: ICD-10-CM

## 2023-11-06 DIAGNOSIS — G89.29 CHRONIC PAIN OF LEFT KNEE: ICD-10-CM

## 2023-11-06 PROCEDURE — 97110 THERAPEUTIC EXERCISES: CPT | Performed by: PHYSICAL THERAPIST

## 2023-11-06 PROCEDURE — 97162 PT EVAL MOD COMPLEX 30 MIN: CPT | Performed by: PHYSICAL THERAPIST

## 2023-11-06 NOTE — PROGRESS NOTES
Physical Therapy Initial Evaluation and Plan of Care        [address] [facility], [address] [place] [address]     Patient: Hyun Perez   : 1980  Diagnosis/ICD-10 Code:  Primary osteoarthritis of left knee [M17.12]  Referring practitioner: Ovidio Hart DO  Date of Initial Visit: 2023  Today's Date: 2023  Patient seen for 1 sessions           Subjective Questionnaire: LEFS: 52.5% limited       Subjective Evaluation    History of Present Illness  Onset date: 2 years ago.  Mechanism of injury: Hyun presents to OP PT with left knee pain that started 2 years ago.  She states she is on her feet a lot and feels that this is the cause.  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  She has been back in town for a few weeks now.  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        Patient Occupation: n/a   Precautions and Work Restrictions: anemia, anxiety, OA, asthma, back injury, depression, neck injuryPain  Current pain rating: 3  At best pain ratin  At worst pain ratin  Quality: burning  Relieving factors: support (tape)  Aggravating factors: sleeping, movement, squatting, ambulation, standing, stairs and prolonged positioning  Progression: no change    Social Support  Lives with: alone    Diagnostic Tests  MRI studies: abnormal    Treatments  Current treatment: physical therapy  Patient Goals  Patient goals for therapy: decreased pain, increased motion and increased strength             Objective          Palpation   Left   No palpable tenderness to the lateral gastrocnemius, medial gastrocnemius, quadratus lumborum, rectus femoris and vastus lateralis.   Hypertonic in the adductor longus, gluteus alyx, gluteus medius, iliopsoas, lumbar paraspinals, piriformis, proximal semitendinosus and TFL.   Tenderness of the adductor longus, distal semitendinosus, gluteus alyx, gluteus medius, iliopsoas, lumbar  paraspinals, piriformis, proximal biceps femoris, proximal semitendinosus, TFL and vastus medialis.   Trigger point to piriformis.     Right   No palpable tenderness to the adductor longus, iliopsoas, lateral gastrocnemius, lumbar paraspinals, medial gastrocnemius, quadratus lumborum, rectus femoris, TFL, vastus lateralis and vastus medialis. Tenderness of the distal semitendinosus.     Tenderness   Left Knee   Tenderness in the fibular head, ITB, lateral joint line, medial joint line and pes anserinus. No tenderness in the patellar tendon.     Right Knee   No tenderness in the fibular head, ITB, lateral joint line, medial joint line, patellar tendon and pes anserinus.     Neurological Testing     Sensation     Knee   Left Knee   Intact: Light touch    Right Knee   Intact: light touch     Additional Neurological Details  Burning in L lateral knee and lower leg into foot intermittently    Active Range of Motion     Lumbar   Flexion: Active lumbar flexion: stretch in hamstrings. WFL  Extension: Active lumbar extension: pain in L SI with extension. WFL  Left Hip   Extension: WFL    Right Hip   Extension: WFL  Left Knee   Flexion: WFL  Extension: WFL    Right Knee   Flexion: WFL  Extension: WFL    Passive Range of Motion   Left Hip   Flexion: WFL  Abduction: WFL  External rotation (prone): WFL  Internal rotation (90/90): Left hip passive internal rotation 90/90: limited to neutral.     Right Hip   Flexion: WFL  Abduction: WFL  External rotation (prone): WFL  Internal rotation (90/90): Right hip passive internal rotation 90/90: limited to 10 deg.     Patellar Static Positioning   Left Knee: WFL  Right Knee: WFL    Strength/Myotome Testing     Left Hip   Planes of Motion   Flexion: 4+  Extension: 4+  Abduction: 4+  Adduction: 4+    Right Hip   Planes of Motion   Flexion: 4+  Extension: 4+  Abduction: 4+  Adduction: 4+    Left Knee   Flexion: 4+  Extension: 4+  Quadriceps contraction: good    Right Knee   Flexion:  4+  Extension: 4+  Quadriceps contraction: good    Tests     Lumbar   Positive repeated extension.   Negative repeated flexion.     Left Hip   Positive piriformis.   Negative FADIR.   90/90 SLR: Negative.   SLR: Negative.     Left Knee   Positive Thessaly's test at 5 degrees, Thessaly's test at 20 degrees and valgus stress test at 30 degrees.     Right Knee   Negative Thessaly's test at 5 degrees, Thessaly's test at 20 degrees and valgus stress test at 30 degrees.       See Exercise, Manual, and Modality Logs for complete treatment.     MRI showing moderate medial compartment and patellofemoral arthritis.  Also radial tear of the posterior horn of the medial meniscus with mild displacement into the medial gutter and peripheral vertical tear of the posterior horn of the medial meniscus.     Assessment & Plan       Assessment  Impairments: abnormal gait, abnormal muscle tone, abnormal or restricted ROM, activity intolerance, impaired physical strength, lacks appropriate home exercise program, pain with function and weight-bearing intolerance   Functional limitations: sleeping, walking, uncomfortable because of pain, standing, stooping and unable to perform repetitive tasks   Assessment details: The patient is a 43 y.o. female who presents to physical therapy today for chronic knee and hip pain . Upon initial evaluation, the patient demonstrates the following impairments: L ITB/glutes/semitendinosus ttp and tightness, L lateral knee and thigh burning pain, core stabilization weakness. Due to these impairments, the patient is unable to sleep, squat, walk or stand for prolonged periods without pain. The patient would benefit from skilled PT services to address functional limitations and impairments and to improve patient quality of life.        Goals  Plan Goals: STGs (4 weeks):  1.  Pt will tolerate initial HEP.  2.  Pt will have 50% decreased burning pain in L Lateral leg.      LTGs (12 weeks):  1.  Pt will be I with  HEP.  2.  Pt will have no more than 1/10 pain in her L knee and LE and hip.      Plan  Therapy options: will be seen for skilled therapy services  Planned modality interventions: cryotherapy, high voltage pulsed current (pain management), TENS, thermotherapy (hydrocollator packs), ultrasound and traction  Planned therapy interventions: abdominal trunk stabilization, balance/weight-bearing training, body mechanics training, flexibility, home exercise program, joint mobilization, manual therapy, neuromuscular re-education, soft tissue mobilization, strengthening, stretching and therapeutic activities  Frequency: 2x week  Duration in weeks: 12  Treatment plan discussed with: patient        Visit Diagnoses:    ICD-10-CM ICD-9-CM   1. Primary osteoarthritis of left knee  M17.12 715.16   2. Other tear of medial meniscus of left knee as current injury, initial encounter  S83.242A 836.0   3. Chronic pain of left knee  M25.562 719.46    G89.29 338.29       History # of Personal Factors and/or Comorbidities: MODERATE (1-2)  Examination of Body System(s): # of elements: MODERATE (3)  Clinical Presentation: EVOLVING  Clinical Decision Making: MODERATE      Timed:         Manual Therapy:         mins  07775;     Therapeutic Exercise:    10     mins  44105;     Neuromuscular Radha:        mins  14787;    Therapeutic Activity:          mins  62559;     Gait Training:           mins  03321;     Ultrasound:          mins  49459;    Ionto                                   mins   44576  Self Care                       5     mins   12198  Canalith Repos         mins 54304      Un-Timed:  Electrical Stimulation:         mins  22051 ( );  Dry Needling          mins self-pay  Traction          mins 03784  Low Eval          Mins  58837  Mod Eval     40     Mins  99632  High Eval                            Mins  99798  Re-Eval                               mins  43849    Timed Treatment:   15   mins   Total Treatment:     55    mins    PT SIGNATURE: Maria L Rowland PT         Initial Certification  Certification Period: 11/6/2023 thru 2/4/2024  I certify that the therapy services are furnished while this patient is under my care.  The services outlined above are required by this patient, and will be reviewed every 90 days.     PHYSICIAN: Ovidio Hart, DO      DATE:     Please sign and return via fax to [phone].. Thank you, [facility].

## 2023-11-14 ENCOUNTER — TREATMENT (OUTPATIENT)
Dept: PHYSICAL THERAPY | Facility: CLINIC | Age: 43
End: 2023-11-14
Payer: MEDICAID

## 2023-11-14 DIAGNOSIS — M25.562 CHRONIC PAIN OF LEFT KNEE: ICD-10-CM

## 2023-11-14 DIAGNOSIS — S83.242A OTHER TEAR OF MEDIAL MENISCUS OF LEFT KNEE AS CURRENT INJURY, INITIAL ENCOUNTER: ICD-10-CM

## 2023-11-14 DIAGNOSIS — M54.50 LOW BACK PAIN POTENTIALLY ASSOCIATED WITH RADICULOPATHY: ICD-10-CM

## 2023-11-14 DIAGNOSIS — M17.12 PRIMARY OSTEOARTHRITIS OF LEFT KNEE: Primary | ICD-10-CM

## 2023-11-14 DIAGNOSIS — G89.29 CHRONIC PAIN OF LEFT KNEE: ICD-10-CM

## 2023-11-14 PROCEDURE — 97110 THERAPEUTIC EXERCISES: CPT | Performed by: PHYSICAL THERAPIST

## 2023-11-14 PROCEDURE — 97012 MECHANICAL TRACTION THERAPY: CPT | Performed by: PHYSICAL THERAPIST

## 2023-11-14 PROCEDURE — 97112 NEUROMUSCULAR REEDUCATION: CPT | Performed by: PHYSICAL THERAPIST

## 2023-11-14 NOTE — PROGRESS NOTES
Physical Therapy Daily Treatment Note  [facility] [facility] [address], [address] [place] [address]     Patient: Hynu Perez   : 1980  Referring practitioner: Ovidio Hart DO  Diagnosis:      ICD-10-CM ICD-9-CM   1. Primary osteoarthritis of left knee  M17.12 715.16   2. Other tear of medial meniscus of left knee as current injury, initial encounter  S83.242A 836.0   3. Chronic pain of left knee  M25.562 719.46    G89.29 338.29   4. Low back pain potentially associated with radiculopathy  M54.50 724.2     Date of Initial Visit: Type: THERAPY  Noted: 2023  Today's Date: 2023    VISIT#: 2          Subjective   Hyun Perez reports: she bought a new bed mattress in September and her back pain is not better, so she feels the mattress is not good.     Objective   See Exercise, Manual, and Modality Logs for complete treatment.       Assessment & Plan       Assessment  Assessment details: Reviewed and progressed exercises, adding NuStep for warm up and ITB/hs stretches with strap.  Cues needed for proper form and execution.  Core stabilization exercises progressed and lumbar traction trialed today.  Assess benefits of traction next visit.            Progress per Plan of Care and Progress strengthening /stabilization /functional activity           Timed:  Manual Therapy:         mins  95190;  Therapeutic Exercise:    10     mins  39465;     Neuromuscular Radha:    10    mins  41265;    Therapeutic Activity:     8     mins  89563;     Gait Training:           mins  82126;     Ultrasound:          mins  00267;    Electrical Stimulation:         mins  37255 ( );    Untimed:  Electrical Stimulation:         mins  63004 ( );  Mechanical Traction:    20     mins  67880;   Dry needling:       Self pay    Timed Treatment:   28   mins   Total Treatment:     48   mins  Maria L Rowland, PT, DPT, CLT, CIDN  Physical Therapist

## 2023-11-16 ENCOUNTER — TREATMENT (OUTPATIENT)
Dept: PHYSICAL THERAPY | Facility: CLINIC | Age: 43
End: 2023-11-16
Payer: MEDICAID

## 2023-11-16 DIAGNOSIS — M17.12 PRIMARY OSTEOARTHRITIS OF LEFT KNEE: Primary | ICD-10-CM

## 2023-11-16 DIAGNOSIS — S83.242A OTHER TEAR OF MEDIAL MENISCUS OF LEFT KNEE AS CURRENT INJURY, INITIAL ENCOUNTER: ICD-10-CM

## 2023-11-16 DIAGNOSIS — M54.50 LOW BACK PAIN POTENTIALLY ASSOCIATED WITH RADICULOPATHY: ICD-10-CM

## 2023-11-16 DIAGNOSIS — G89.29 CHRONIC PAIN OF LEFT KNEE: ICD-10-CM

## 2023-11-16 DIAGNOSIS — M25.562 CHRONIC PAIN OF LEFT KNEE: ICD-10-CM

## 2023-11-16 NOTE — PROGRESS NOTES
Physical Therapy Daily Treatment Note  [facility], [address] [place] [address]     Patient: Hyun Perez   : 1980  Referring practitioner: Ovidio Hart DO  Diagnosis:      ICD-10-CM ICD-9-CM   1. Primary osteoarthritis of left knee  M17.12 715.16   2. Other tear of medial meniscus of left knee as current injury, initial encounter  S83.242A 836.0   3. Chronic pain of left knee  M25.562 719.46    G89.29 338.29   4. Low back pain potentially associated with radiculopathy  M54.50 724.2     Date of Initial Visit: Type: THERAPY  Noted: 2023  Today's Date: 2023    VISIT#: 3          Subjective   Hyun Perez reports: increased lower back pain of 7/10 level today.  She states it hurt more after traction.      Objective   See Exercise, Manual, and Modality Logs for complete treatment.       Assessment & Plan       Assessment  Assessment details: Pt had some increased anterior rotation on the right pelvis with mobs performed to improve alignment and hip flexor stretches for increased flexibility.  Manual performed to decrease soft tissue tightness.            Progress per Plan of Care           Timed:  Manual Therapy:   10      mins  43532;  Therapeutic Exercise:         mins  16945;     Neuromuscular Radha:    10    mins  58806;    Therapeutic Activity:     5     mins  26969;     Gait Training:           mins  89911;     Ultrasound:          mins  58780;    Electrical Stimulation:         mins  04281 ( );    Untimed:  Electrical Stimulation:         mins  61297 ( );  Mechanical Traction:         mins  08996;   Dry needling:       Self pay    Timed Treatment:   25   mins   Total Treatment:     25   mins  Maria L Rowland PT, DPT, CLT, CIDN  Physical Therapist

## 2023-11-20 ENCOUNTER — TREATMENT (OUTPATIENT)
Dept: PHYSICAL THERAPY | Facility: CLINIC | Age: 43
End: 2023-11-20
Payer: MEDICAID

## 2023-11-20 DIAGNOSIS — G89.29 CHRONIC PAIN OF LEFT KNEE: ICD-10-CM

## 2023-11-20 DIAGNOSIS — M17.12 PRIMARY OSTEOARTHRITIS OF LEFT KNEE: Primary | ICD-10-CM

## 2023-11-20 DIAGNOSIS — M54.50 LOW BACK PAIN POTENTIALLY ASSOCIATED WITH RADICULOPATHY: ICD-10-CM

## 2023-11-20 DIAGNOSIS — M25.562 CHRONIC PAIN OF LEFT KNEE: ICD-10-CM

## 2023-11-20 DIAGNOSIS — S83.242A OTHER TEAR OF MEDIAL MENISCUS OF LEFT KNEE AS CURRENT INJURY, INITIAL ENCOUNTER: ICD-10-CM

## 2023-11-20 NOTE — PROGRESS NOTES
Physical Therapy Daily Treatment Note  [facility] [facility] [address] [address], [address] [place] [address]     Patient: Hyun Perez   : 1980  Referring practitioner: Ovidio Hart DO  Diagnosis:      ICD-10-CM ICD-9-CM   1. Primary osteoarthritis of left knee  M17.12 715.16   2. Other tear of medial meniscus of left knee as current injury, initial encounter  S83.242A 836.0   3. Chronic pain of left knee  M25.562 719.46    G89.29 338.29   4. Low back pain potentially associated with radiculopathy  M54.50 724.2     Date of Initial Visit: Type: THERAPY  Noted: 2023  Today's Date: 2023    VISIT#: 4          Subjective   Hyun Perez reports: she has pain in her left side and is not sure what it could be.  She states she has scoliosis     Objective   See Exercise, Manual, and Modality Logs for complete treatment.       Assessment & Plan       Assessment  Assessment details: Pt has significant scoliotic curve with decreased space between left hip and last rib.  QL mm focus with estim and MH at end for pain relief and increased mm length.            Progress per Plan of Care and Progress strengthening /stabilization /functional activity           Timed:  Manual Therapy:    15     mins  04698;  Therapeutic Exercise:         mins  07152;     Neuromuscular Radha:    15    mins  98233;    Therapeutic Activity:     8     mins  40957;     Gait Training:           mins  02441;     Ultrasound:          mins  05530;    Electrical Stimulation:         mins  00736 ( );    Untimed:  Electrical Stimulation:    20     mins  65497 ( );  Mechanical Traction:         mins  54184;   Dry needling:       Self pay    Timed Treatment:   38   mins   Total Treatment:     58   mins  Maria L Rowland PT, DPT, CLT, CIDN  Physical Therapist

## 2023-11-22 ENCOUNTER — TREATMENT (OUTPATIENT)
Dept: PHYSICAL THERAPY | Facility: CLINIC | Age: 43
End: 2023-11-22
Payer: MEDICAID

## 2023-11-22 DIAGNOSIS — G89.29 CHRONIC PAIN OF LEFT KNEE: ICD-10-CM

## 2023-11-22 DIAGNOSIS — S83.242A OTHER TEAR OF MEDIAL MENISCUS OF LEFT KNEE AS CURRENT INJURY, INITIAL ENCOUNTER: ICD-10-CM

## 2023-11-22 DIAGNOSIS — M25.562 CHRONIC PAIN OF LEFT KNEE: ICD-10-CM

## 2023-11-22 DIAGNOSIS — M17.12 PRIMARY OSTEOARTHRITIS OF LEFT KNEE: Primary | ICD-10-CM

## 2023-11-22 DIAGNOSIS — M54.50 LOW BACK PAIN POTENTIALLY ASSOCIATED WITH RADICULOPATHY: ICD-10-CM

## 2023-11-22 NOTE — PROGRESS NOTES
Physical Therapy Daily Treatment Note  [facility] [facility] [address], [address] [place] [address]     Patient: Hyun Perez   : 1980  Referring practitioner: Ovidio Hart DO  Diagnosis:      ICD-10-CM ICD-9-CM   1. Primary osteoarthritis of left knee  M17.12 715.16   2. Other tear of medial meniscus of left knee as current injury, initial encounter  S83.242A 836.0   3. Chronic pain of left knee  M25.562 719.46    G89.29 338.29   4. Low back pain potentially associated with radiculopathy  M54.50 724.2     Date of Initial Visit: Type: THERAPY  Noted: 2023  Today's Date: 2023    VISIT#: 5          Subjective   Hyun Perez reports: she had some relief after QL stretches added last session.      Objective   See Exercise, Manual, and Modality Logs for complete treatment.       Assessment & Plan       Assessment  Assessment details: Added more varieties of QL stretching for further relief.  Also, performed mobs for improved SI alignment with LLD corrected afterwards.            Progress per Plan of Care and Progress strengthening /stabilization /functional activity           Timed:  Manual Therapy:    5     mins  84313;  Therapeutic Exercise:    8     mins  37048;     Neuromuscular Radha:    10    mins  81368;    Therapeutic Activity:     8     mins  44355;     Gait Training:           mins  25132;     Ultrasound:          mins  21020;    Electrical Stimulation:         mins  79206 ( );    Untimed:  Electrical Stimulation:         mins  88822 ( );  Mechanical Traction:         mins  35173;   Dry needling:       Self pay    Timed Treatment:   31   mins   Total Treatment:     31   mins  Maria L Rowland PT, DPT, CLT, CIDN  Physical Therapist

## 2023-11-27 ENCOUNTER — TREATMENT (OUTPATIENT)
Dept: PHYSICAL THERAPY | Facility: CLINIC | Age: 43
End: 2023-11-27
Payer: MEDICAID

## 2023-11-27 DIAGNOSIS — S83.242A OTHER TEAR OF MEDIAL MENISCUS OF LEFT KNEE AS CURRENT INJURY, INITIAL ENCOUNTER: ICD-10-CM

## 2023-11-27 DIAGNOSIS — M54.50 LOW BACK PAIN POTENTIALLY ASSOCIATED WITH RADICULOPATHY: ICD-10-CM

## 2023-11-27 DIAGNOSIS — M25.562 CHRONIC PAIN OF LEFT KNEE: ICD-10-CM

## 2023-11-27 DIAGNOSIS — G89.29 CHRONIC PAIN OF LEFT KNEE: ICD-10-CM

## 2023-11-27 DIAGNOSIS — M17.12 PRIMARY OSTEOARTHRITIS OF LEFT KNEE: Primary | ICD-10-CM

## 2023-11-27 PROCEDURE — 97530 THERAPEUTIC ACTIVITIES: CPT | Performed by: PHYSICAL THERAPIST

## 2023-11-27 PROCEDURE — 97140 MANUAL THERAPY 1/> REGIONS: CPT | Performed by: PHYSICAL THERAPIST

## 2023-11-27 PROCEDURE — 97014 ELECTRIC STIMULATION THERAPY: CPT | Performed by: PHYSICAL THERAPIST

## 2023-11-27 NOTE — PROGRESS NOTES
Physical Therapy Daily Treatment Note  [facility] [address] [facility], [address] [place] [address]     Patient: Hyun Perez   : 1980  Referring practitioner: Ovidio Hart DO  Diagnosis:      ICD-10-CM ICD-9-CM   1. Primary osteoarthritis of left knee  M17.12 715.16   2. Other tear of medial meniscus of left knee as current injury, initial encounter  S83.242A 836.0   3. Chronic pain of left knee  M25.562 719.46    G89.29 338.29   4. Low back pain potentially associated with radiculopathy  M54.50 724.2     Date of Initial Visit: Type: THERAPY  Noted: 2023  Today's Date: 2023    VISIT#: 6          Subjective   Hyun Perez reports: her left knee buckled a few times on Saturday when she worked.      Objective   See Exercise, Manual, and Modality Logs for complete treatment.       Assessment & Plan       Assessment  Assessment details: Estim and ice for L knee pain relief and ITB swelling and tenderness.  Manual for mobility and pain relief.           Progress per Plan of Care and Progress strengthening /stabilization /functional activity           Timed:  Manual Therapy:    8     mins  41640;  Therapeutic Exercise:         mins  23205;     Neuromuscular Radha:        mins  38606;    Therapeutic Activity:     15     mins  40340;     Gait Training:           mins  07540;     Ultrasound:          mins  84798;    Electrical Stimulation:         mins  97715 ( );    Untimed:  Electrical Stimulation:     20     mins  37758 ( );  Mechanical Traction:         mins  08896;   Dry needling:       Self pay    Timed Treatment:   23   mins   Total Treatment:     43   mins  Maria L Rowland PT, DPT, CLT, CIDN  Physical Therapist

## 2023-11-29 ENCOUNTER — TREATMENT (OUTPATIENT)
Dept: PHYSICAL THERAPY | Facility: CLINIC | Age: 43
End: 2023-11-29
Payer: MEDICAID

## 2023-11-29 DIAGNOSIS — S83.242A OTHER TEAR OF MEDIAL MENISCUS OF LEFT KNEE AS CURRENT INJURY, INITIAL ENCOUNTER: ICD-10-CM

## 2023-11-29 DIAGNOSIS — M25.562 CHRONIC PAIN OF LEFT KNEE: ICD-10-CM

## 2023-11-29 DIAGNOSIS — M54.50 LOW BACK PAIN POTENTIALLY ASSOCIATED WITH RADICULOPATHY: ICD-10-CM

## 2023-11-29 DIAGNOSIS — M17.12 PRIMARY OSTEOARTHRITIS OF LEFT KNEE: Primary | ICD-10-CM

## 2023-11-29 DIAGNOSIS — G89.29 CHRONIC PAIN OF LEFT KNEE: ICD-10-CM

## 2023-12-07 ENCOUNTER — TREATMENT (OUTPATIENT)
Dept: PHYSICAL THERAPY | Facility: CLINIC | Age: 43
End: 2023-12-07
Payer: MEDICAID

## 2023-12-07 DIAGNOSIS — M25.562 CHRONIC PAIN OF LEFT KNEE: ICD-10-CM

## 2023-12-07 DIAGNOSIS — S83.242A OTHER TEAR OF MEDIAL MENISCUS OF LEFT KNEE AS CURRENT INJURY, INITIAL ENCOUNTER: ICD-10-CM

## 2023-12-07 DIAGNOSIS — M54.50 LOW BACK PAIN POTENTIALLY ASSOCIATED WITH RADICULOPATHY: ICD-10-CM

## 2023-12-07 DIAGNOSIS — G89.29 CHRONIC PAIN OF LEFT KNEE: ICD-10-CM

## 2023-12-07 DIAGNOSIS — M17.12 PRIMARY OSTEOARTHRITIS OF LEFT KNEE: Primary | ICD-10-CM

## 2023-12-07 NOTE — PROGRESS NOTES
Physical Therapy Daily Treatment Note/Discharge Note  [address] [facility] [address] [facility] [address] [facility], [address] [place] [address]     Patient: Hyun Perez   : 1980  Referring practitioner: Ovidio Hart DO  Diagnosis:      ICD-10-CM ICD-9-CM   1. Primary osteoarthritis of left knee  M17.12 715.16   2. Other tear of medial meniscus of left knee as current injury, initial encounter  S83.242A 836.0   3. Chronic pain of left knee  M25.562 719.46    G89.29 338.29   4. Low back pain potentially associated with radiculopathy  M54.50 724.2     Date of Initial Visit: Type: THERAPY  Noted: 2023  Today's Date: 2023    VISIT#: 8          Subjective Evaluation    Pain  Current pain ratin  At best pain ratin  At worst pain rating: 10         Hyun Perez reports: 6/10 in the left lateral knee and 9/10 in neck/shoulder, but lower back feels ok today.  She slept wrong and had increased neck pain and went to the chiropractor for relief.      Objective   See Exercise, Manual, and Modality Logs for complete treatment.       Assessment & Plan       Assessment  Assessment details: Pt has met goals as noted below and will be discharged with maintenance HEP.  She has 49% limitation with LEFI compared to 52.5% limited.      Goals  Plan Goals: STGs (4 weeks):  1.  Pt will tolerate initial HEP. - MET  2.  Pt will have 50% decreased burning pain in L Lateral leg.- MET with 50% decreased      LTGs (12 weeks):  1.  Pt will be I with HEP. - MET inconsistently  2.  Pt will have no more than 1/10 pain in her L knee and LE and hip.        Plan  Therapy options: will not be seen for skilled therapy services          Progress per Plan of Care and Progress strengthening /stabilization /functional activity           Timed:  Manual Therapy:  8       mins  90236;  Therapeutic Exercise:    10     mins  82596;     Neuromuscular Radha:    10    mins  74395;    Therapeutic Activity:     10     mins  94674;     Gait Training:           mins  39370;      Ultrasound:          mins  11249;    Electrical Stimulation:         mins  99250 ( );    Untimed:  Electrical Stimulation:         mins  36764 ( );  Mechanical Traction:         mins  98129;   Dry needling:       Self pay    Timed Treatment:   38   mins   Total Treatment:     38   mins  Maria L Rowland PT, DPT, CLT, [place]MN  Physical Therapist

## 2024-04-01 ENCOUNTER — OFFICE VISIT (OUTPATIENT)
Dept: ORTHOPEDIC SURGERY | Facility: CLINIC | Age: 44
End: 2024-04-01
Payer: MEDICAID

## 2024-04-01 VITALS — WEIGHT: 183 LBS | OXYGEN SATURATION: 99 % | BODY MASS INDEX: 28.72 KG/M2 | HEIGHT: 67 IN | HEART RATE: 74 BPM

## 2024-04-01 DIAGNOSIS — M25.562 CHRONIC PAIN OF LEFT KNEE: Primary | ICD-10-CM

## 2024-04-01 DIAGNOSIS — G89.29 CHRONIC PAIN OF LEFT KNEE: Primary | ICD-10-CM

## 2024-04-01 DIAGNOSIS — S83.242A OTHER TEAR OF MEDIAL MENISCUS OF LEFT KNEE AS CURRENT INJURY, INITIAL ENCOUNTER: ICD-10-CM

## 2024-04-01 DIAGNOSIS — M25.50 POLYARTHRALGIA: ICD-10-CM

## 2024-04-01 DIAGNOSIS — G89.29 CHRONIC NECK PAIN: ICD-10-CM

## 2024-04-01 DIAGNOSIS — M22.42 PATELLA, CHONDROMALACIA, LEFT: ICD-10-CM

## 2024-04-01 DIAGNOSIS — M17.12 OSTEOARTHRITIS OF LEFT KNEE, UNSPECIFIED OSTEOARTHRITIS TYPE: ICD-10-CM

## 2024-04-01 DIAGNOSIS — M54.2 CHRONIC NECK PAIN: ICD-10-CM

## 2024-04-01 PROCEDURE — 99214 OFFICE O/P EST MOD 30 MIN: CPT | Performed by: STUDENT IN AN ORGANIZED HEALTH CARE EDUCATION/TRAINING PROGRAM

## 2024-04-01 NOTE — PROGRESS NOTES
FOLLOW UP VISIT    Patient: Hyun Perez  ?  YOB: 1980    MRN: [id number]  ?  Chief Complaint   Patient presents with    Left Knee - Follow-up      ?  HPI: Patient returning for follow-up of left knee pain.  Last office visit 6/13/2023.  Had MRI in the interim which was discussed via telephone encounter remarkable for both degenerative as well as meniscal pathology of the left knee, primarily of the patellofemoral and medial compartment.  She had also undergone physical therapy for 6 to 8 weeks per note for multiple complaints addressed including chronic neck pain, lumbar pain, left knee as well as shoulder pain.  She states she had no improvement in regards to her left knee pain with her physical therapy course.  Continues to have no benefit with oral medications, activity modifications, bracing, now formal PT and home exercise program.  Unable to tolerate take a corticosteroid injections due to prior reaction.  We have previously discussed viscosupplementation injection, and she is interested in pursuing this route given her continued knee pain despite conservative management.     Allergies:   Allergies   Allergen Reactions    Antihistamines, Diphenhydramine-Type Mental Status Change    Banana Nausea Only    Benadryl [Diphenhydramine] Mental Status Change    Corticosteroids Mental Status Change    Kiwi Extract Nausea Only    Medrol [Methylprednisolone] Mental Status Change    Penicillin V Potassium Swelling    Milk Thistle Other (See Comments)    Montelukast Other (See Comments)     SUICIDAL IDEATION       Past Medical History:   Diagnosis Date    Anxiety     Knee swelling 2022    Low back pain     Scoliosis In the 90s    Tailbone injury     Tailbone fx     Past Surgical History:   Procedure Laterality Date    LEEP  1997    TUBAL ABDOMINAL LIGATION Bilateral 2015     Social History     Occupational History    Not on file   Tobacco Use    Smoking status: Former     Current packs/day: 0.00     Average  "packs/day: 1 pack/day for 25.0 years (25.0 ttl pk-yrs)     Types: Cigarettes     Start date: 1993     Quit date: 2018     Years since quittin.2    Smokeless tobacco: Never   Vaping Use    Vaping status: Some Days   Substance and Sexual Activity    Alcohol use: Not Currently    Drug use: Not Currently    Sexual activity: Not Currently      Social History     Social History Narrative    Not on file     Family History   Problem Relation Age of Onset    Hypertension Mother     Coronary artery disease Mother         CABG x4 in late 30s    Depression Mother         mother committed suicide    Hypertension Maternal Grandmother     Diabetes Maternal Grandmother     Depression Maternal Grandmother         committed suicide    Hypertension Maternal Grandfather     Diabetes Maternal Grandfather     Heart attack Maternal Grandfather     Depression Maternal Cousin         committed suicide    Depression Maternal Aunt         committed suicide    Cancer Paternal Grandfather          from colon cancer       Review of Systems  Constitutional: Negative.  Negative for fever.   Musculoskeletal: Positive for joint pain  Skin: Negative.  Negative for rash and wound.    Neurological: Negative for numbness.       Body mass index is 28.66 kg/m².  Vitals:    24 1423   Pulse: 74   SpO2: 99%   Weight: 83 kg (183 lb)   Height: 170.2 cm (67\")        Physical Exam  Constitutional: Patient is oriented to person, place, and time. Appears well-developed and well-nourished.   Head: Normocephalic and atraumatic.   Pulmonary/Chest: Effort normal.   Musculoskeletal:   See detailed exam below   Neurological: Alert and oriented to person, place, and time. No sensory deficit. Coordination normal.   Skin: Skin is warm and dry. Capillary refill takes less than 2 seconds. No rash noted. No erythema.   Psych: Alternating between anxious and depressed mood, tearful.  Pressured speech.    The left knee is without obvious signs of acute " bony deformity, quadriceps atrophy, swelling, erythema, ecchymosis or joint effusion.  There is continued tenderness over the medial and lateral patellar facets.  Patella crepitus is negative. Patella grind is positive.  Continued tenderness over medial and posterior medial joint line.  Other soft tissue including the quad tendon, patellar tendon, and proximal gastroc tendon with no tenderness.  Range of motion improved, flexion to 130 degrees with mild discomfort.  Extension full without discomfort.   Extension full although pain at end range.  Knee strength is 5/5 and symmetrical.  Hip strength is 4/5.  Eastman positive for pain over the medial joint line.  Varus & valgus stress, Lachman's, anterior drawer, and posterior drawer are all negative. Trendelenburg is positive.  The opposite knee is nontender and stable. Gait is pain-free and tandem.      Diagnostics:    Reviewed images and radiology report of left knee MRI, from [facility] on 7/10/2023, summary impression below:    Moderate medial compartment and mild patellofemoral arthritis.  Radial tear posterior horn medial meniscus with mild displacement into medial gutter.  Peripheral vertical tear posterior horn medial meniscus.  Subchondral edema and medial tibial plateau.  Full-thickness cartilage fissure at patellar apex with chondromalacia.      Reviewed images and radiology report of 2 view xrays of Left Knee, from [facility] on 1/11/23, summary of impression below:  Mild to moderate medial osteoarthritic change, with joint space narrowing and subchondral sclerosis      Assessment:  Diagnoses and all orders for this visit:    1. Chronic pain of left knee (Primary)  -     Visco Treatment; Future    2. Other tear of medial meniscus of left knee as current injury, initial encounter  -     Visco Treatment; Future    3. Patella, chondromalacia, left  -     Visco Treatment; Future    4. Osteoarthritis of left knee, unspecified osteoarthritis  type  -     Visco Treatment; Future    5. Polyarthralgia  -     Ambulatory Referral to Pain Management    6. Chronic neck pain  -     Ambulatory Referral to Pain Management          Plan    Patient returning for follow-up of left knee pain.  Again discussed MRI findings remarkable for osteoarthritic as well as meniscal pathology as noted above.  She continues to have pain around patellofemoral joint as well as medial compartment despite conservative management including 8 weeks of formal physical therapy, activity modification, bracing, and oral medications.  We discussed both surgical options/referral as well as continue conservative management.  Specifically we had previously discussed viscosupplementation injection, and she would like to pursue Visco injection for her knee symptoms.  Will work on Visco approval and follow-up at that time.  Encouraged her to continue other conservative management as noted below.  Discussed in depth physical therapy for both her knee and the low back pain is an important component of her comprehensive treatment.  She also has significant patellofemoral symptoms that would likely improve with physical therapy.  Activity modifications discussed and recommended  Compression/hinged knee brace as needed.  Rest, ice, compression, and elevation (RICE) therapy  Oral NSAID/Tylenol as needed.  Follow up after Visco approval for potential injection.  Patient requesting referral to pain management for polyarthralgia as well as chronic lumbar and neck symptoms, specifically for discussion of injection options for her chronic symptoms.  Referral was placed as noted above for evaluation      Date of encounter: 4/1/2024  Ovidio Hart DO      Disclaimer: Please note that areas of this note were completed with computer voice recognition software.  Quite often unanticipated grammatical, syntax, homophones, and other interpretive errors are inadvertently transcribed by the computer software. Please  excuse any errors that have escaped final proofreading.

## 2024-04-23 ENCOUNTER — OFFICE VISIT (OUTPATIENT)
Dept: ORTHOPEDIC SURGERY | Facility: CLINIC | Age: 44
End: 2024-04-23
Payer: MEDICAID

## 2024-04-23 VITALS — WEIGHT: 183 LBS | BODY MASS INDEX: 28.72 KG/M2 | HEIGHT: 67 IN

## 2024-04-23 DIAGNOSIS — G89.29 CHRONIC PAIN OF LEFT KNEE: Primary | ICD-10-CM

## 2024-04-23 DIAGNOSIS — M25.562 CHRONIC PAIN OF LEFT KNEE: Primary | ICD-10-CM

## 2024-04-23 DIAGNOSIS — M17.12 OSTEOARTHRITIS OF LEFT KNEE, UNSPECIFIED OSTEOARTHRITIS TYPE: ICD-10-CM

## 2024-04-23 DIAGNOSIS — M25.50 POLYARTHRALGIA: ICD-10-CM

## 2024-04-23 DIAGNOSIS — S83.242D OTHER TEAR OF MEDIAL MENISCUS OF LEFT KNEE AS CURRENT INJURY, SUBSEQUENT ENCOUNTER: ICD-10-CM

## 2024-04-23 PROCEDURE — 20610 DRAIN/INJ JOINT/BURSA W/O US: CPT | Performed by: STUDENT IN AN ORGANIZED HEALTH CARE EDUCATION/TRAINING PROGRAM

## 2024-04-23 PROCEDURE — 99213 OFFICE O/P EST LOW 20 MIN: CPT | Performed by: STUDENT IN AN ORGANIZED HEALTH CARE EDUCATION/TRAINING PROGRAM

## 2024-04-23 RX ORDER — BUPROPION HYDROCHLORIDE 150 MG/1
1 TABLET ORAL DAILY
COMMUNITY
Start: 2024-04-15

## 2024-04-23 RX ORDER — ESZOPICLONE 3 MG/1
3 TABLET, FILM COATED ORAL
COMMUNITY
Start: 2024-04-15

## 2024-04-23 RX ORDER — CHOLECALCIFEROL (VITAMIN D3) 1250 MCG
CAPSULE ORAL
COMMUNITY
Start: 2024-04-22

## 2024-04-23 RX ORDER — DEXTROAMPHETAMINE SACCHARATE, AMPHETAMINE ASPARTATE, DEXTROAMPHETAMINE SULFATE AND AMPHETAMINE SULFATE 5; 5; 5; 5 MG/1; MG/1; MG/1; MG/1
1 TABLET ORAL EVERY 12 HOURS SCHEDULED
COMMUNITY
Start: 2024-04-17

## 2024-04-23 RX ORDER — LIDOCAINE HYDROCHLORIDE 10 MG/ML
2 INJECTION, SOLUTION EPIDURAL; INFILTRATION; INTRACAUDAL; PERINEURAL
Status: COMPLETED | OUTPATIENT
Start: 2024-04-23 | End: 2024-04-23

## 2024-04-23 RX ADMIN — LIDOCAINE HYDROCHLORIDE 2 ML: 10 INJECTION, SOLUTION EPIDURAL; INFILTRATION; INTRACAUDAL; PERINEURAL at 11:31

## 2024-04-23 NOTE — PROGRESS NOTES
"FOLLOW UP VISIT    Patient: Hyun Perez  ?  YOB: 1980    MRN: [id number]  ?  Chief Complaint   Patient presents with    Left Knee - Follow-up      ?  HPI: Patient returning for follow-up of chronic left knee pain, and Synvisc 1 injection.  She denies any significant interval change since last office visit.  No acute injury.  Continues to have daily left knee pain worse with weightbearing activity limiting daily function.  Not improved with conservative management.  States she continues to attempt to walk with significant limitations.  She is also requesting MRI on her right knee, she states it feels the same as her left knee at this time.  She also endorses she has \"pain all over\" and is still having difficulty with recent sacrum fracture treated through PCP.  She denies personal or family history of inflammatory arthropathy.      Allergies:   Allergies   Allergen Reactions    Antihistamines, Diphenhydramine-Type Mental Status Change    Banana Nausea Only    Benadryl [Diphenhydramine] Mental Status Change    Corticosteroids Mental Status Change    Kiwi Extract Nausea Only    Medrol [Methylprednisolone] Mental Status Change    Penicillin V Potassium Swelling    Milk Thistle Other (See Comments)    Montelukast Other (See Comments)     SUICIDAL IDEATION       Past Medical History:   Diagnosis Date    Anxiety     Knee swelling     Low back pain     Scoliosis In the 90s    Tailbone injury     Tailbone fx     Past Surgical History:   Procedure Laterality Date    LEEP      TUBAL ABDOMINAL LIGATION Bilateral      Social History     Occupational History    Not on file   Tobacco Use    Smoking status: Former     Current packs/day: 0.00     Average packs/day: 1 pack/day for 25.0 years (25.0 ttl pk-yrs)     Types: Cigarettes     Start date: 1993     Quit date: 2018     Years since quittin.3    Smokeless tobacco: Never   Vaping Use    Vaping status: Some Days   Substance and Sexual " "Activity    Alcohol use: Not Currently    Drug use: Not Currently    Sexual activity: Not Currently      Social History     Social History Narrative    Not on file     Family History   Problem Relation Age of Onset    Hypertension Mother     Coronary artery disease Mother         CABG x4 in late 30s    Depression Mother         mother committed suicide    Hypertension Maternal Grandmother     Diabetes Maternal Grandmother     Depression Maternal Grandmother         committed suicide    Hypertension Maternal Grandfather     Diabetes Maternal Grandfather     Heart attack Maternal Grandfather     Depression Maternal Cousin         committed suicide    Depression Maternal Aunt         committed suicide    Cancer Paternal Grandfather          from colon cancer       Review of Systems  Constitutional: Negative.  Negative for fever.   Musculoskeletal: Positive for joint pain  Skin: Negative.  Negative for rash and wound.    Neurological: Negative for numbness.       Body mass index is 28.66 kg/m².  Vitals:    24 1053   Weight: 83 kg (183 lb)   Height: 170.2 cm (67\")        Physical Exam  Constitutional: Patient is oriented to person, place, and time. Appears well-developed and well-nourished.   Head: Normocephalic and atraumatic.   Pulmonary/Chest: Effort normal.   Musculoskeletal:   See detailed exam below   Neurological: Alert and oriented to person, place, and time. No sensory deficit. Coordination normal.   Skin: Skin is warm and dry. Capillary refill takes less than 2 seconds. No rash noted. No erythema.   Psych: Alternating between anxious and depressed mood, tearful.  Pressured speech.    The left knee is without obvious signs of acute bony deformity, quadriceps atrophy, swelling, erythema, ecchymosis or joint effusion.  There is continued tenderness over the medial and lateral patellar facets.  Patella crepitus is negative. Patella grind is positive.  Continued tenderness over medial and posterior medial " joint line.  Other soft tissue including the quad tendon, patellar tendon, and proximal gastroc tendon with no tenderness.  Range of motion improved, flexion to 130 degrees with mild discomfort.  Extension full without discomfort.   Extension full although pain at end range.  Knee strength is 5/5 and symmetrical.  Hip strength is 4/5.  Eastman positive for pain over the medial joint line.  Varus & valgus stress, Lachman's, anterior drawer, and posterior drawer are all negative. Trendelenburg is positive.  The opposite knee is nontender and stable. Gait is pain-free and tandem.    Exam review no significant interval change since last office visit.    Diagnostics:    No new diagnostic imaging today.      Reviewed images and radiology report of left knee MRI, from [facility] on 7/10/2023, summary impression below:    Moderate medial compartment and mild patellofemoral arthritis.  Radial tear posterior horn medial meniscus with mild displacement into medial gutter.  Peripheral vertical tear posterior horn medial meniscus.  Subchondral edema and medial tibial plateau.  Full-thickness cartilage fissure at patellar apex with chondromalacia.      Reviewed images and radiology report of 2 view xrays of Left Knee, from [facility] on 1/11/23, summary of impression below:  Mild to moderate medial osteoarthritic change, with joint space narrowing and subchondral sclerosis      Assessment:  Diagnoses and all orders for this visit:    1. Chronic pain of left knee (Primary)  -     - Large Joint Arthrocentesis: L knee    2. Osteoarthritis of left knee, unspecified osteoarthritis type  -     - Large Joint Arthrocentesis: L knee    3. Other tear of medial meniscus of left knee as current injury, subsequent encounter  -     - Large Joint Arthrocentesis: L knee    4. Polyarthralgia            Plan    Patient returning for follow-up of chronic left knee pain, and Synvisc 1 injection which was given as noted below without  complication.  Discussed typically will take 2 to 4 weeks to notice improvement in symptoms, with maximum benefit at 2 to 3-month paulette.  If significant benefit can consider repeating at 6-month paulette.  Encouraged her to ice frequently, and limit activity over the next few days due to likely increased soreness and fullness secondary to injection.  Patient briefly bring up secondary complaints of right knee pain as well as polyarthralgia.  Previous x-ray imaging of right knee negative for acute osseous abnormality.  We discussed continuing treatment of left knee, to see if this helps to offload left symptomatic right knee and improve residual symptoms.  Will continue to monitor at follow-up visits.  Did encourage her to continue home physical therapy exercises for her right knee and other conservative management as previously discussed.  Regarding her polyarthralgia, the patient is stating that she has generalized joint pains diffusely throughout the body.  Denies family or personal history of inflammatory arthropathies.  She has did not discuss with her PCP previously.  I encouraged her to discuss with PCP, for potential inflammatory arthritis workup.  We also placed pain management referral per patient request at last office visit, she denies scheduling or follow-up with their office at this time  Compression/hinged knee brace as needed.  Rest, ice, compression, and elevation (RICE) therapy  Oral NSAID/Tylenol as needed.  Recommend 3 to 4-month follow-up to monitor benefit from viscosupplementation.  Can monitor right knee symptoms at that visit.  Patient requesting referral to pain management for polyarthralgia as well as chronic lumbar and neck symptoms, specifically for discussion of injection options for her chronic symptoms.  Referral was placed as noted above for evaluation    - Large Joint Arthrocentesis: L knee on 4/23/2024 11:31 AM  Indications: pain  Details: 21 G needle, anterolateral  approach  Medications: 2 mL lidocaine PF 1% 1 %; 48 mg hylan 48 MG/6ML  Outcome: tolerated well, no immediate complications  Procedure, treatment alternatives, risks and benefits explained, specific risks discussed. Consent was given by the patient. Immediately prior to procedure a time out was called to verify the correct patient, procedure, equipment, support staff and site/side marked as required. Patient was prepped and draped in the usual sterile fashion.         Date of encounter: 4/23/2024  Ovidio Hart DO      Disclaimer: Please note that areas of this note were completed with computer voice recognition software.  Quite often unanticipated grammatical, syntax, homophones, and other interpretive errors are inadvertently transcribed by the computer software. Please excuse any errors that have escaped final proofreading.